# Patient Record
Sex: FEMALE | Race: WHITE | NOT HISPANIC OR LATINO | Employment: UNEMPLOYED | URBAN - METROPOLITAN AREA
[De-identification: names, ages, dates, MRNs, and addresses within clinical notes are randomized per-mention and may not be internally consistent; named-entity substitution may affect disease eponyms.]

---

## 2017-02-16 ENCOUNTER — CLINICAL SUPPORT (OUTPATIENT)
Dept: REHABILITATION | Facility: HOSPITAL | Age: 62
End: 2017-02-16
Attending: ORTHOPAEDIC SURGERY
Payer: MEDICAID

## 2017-02-16 DIAGNOSIS — M25.562 PAIN IN BOTH KNEES, UNSPECIFIED CHRONICITY: ICD-10-CM

## 2017-02-16 DIAGNOSIS — M17.0 PRIMARY OSTEOARTHRITIS OF BOTH KNEES: Primary | ICD-10-CM

## 2017-02-16 DIAGNOSIS — M25.561 PAIN IN BOTH KNEES, UNSPECIFIED CHRONICITY: ICD-10-CM

## 2017-02-16 PROCEDURE — 97110 THERAPEUTIC EXERCISES: CPT | Performed by: PHYSICAL MEDICINE & REHABILITATION

## 2017-02-16 PROCEDURE — 97162 PT EVAL MOD COMPLEX 30 MIN: CPT | Performed by: PHYSICAL MEDICINE & REHABILITATION
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
## 2017-03-29 ENCOUNTER — DOCUMENTATION ONLY (OUTPATIENT)
Dept: REHABILITATION | Facility: HOSPITAL | Age: 62
End: 2017-03-29

## 2017-03-29 DIAGNOSIS — G89.29 CHRONIC PAIN OF BOTH KNEES: ICD-10-CM

## 2017-03-29 DIAGNOSIS — M25.562 CHRONIC PAIN OF BOTH KNEES: ICD-10-CM

## 2017-03-29 DIAGNOSIS — M25.561 CHRONIC PAIN OF BOTH KNEES: ICD-10-CM

## 2017-03-29 DIAGNOSIS — M17.0 PRIMARY OSTEOARTHRITIS OF BOTH KNEES: Primary | ICD-10-CM

## 2017-03-29 NOTE — PROGRESS NOTES
Ms. Sevilla was seen in outpatient physical for an initial evaluation on 2/16/17 for osteoarthritis of both knees. Ms. Sevilla has self discharged from physical therapy as she has not returned for further physical therapy since the initial evaluation.  POC has ended  And patient is discharged from physical therapy.        Princess Paez, PT

## 2018-11-07 DIAGNOSIS — Z12.31 VISIT FOR SCREENING MAMMOGRAM: Primary | ICD-10-CM

## 2018-11-16 ENCOUNTER — TELEPHONE (OUTPATIENT)
Dept: ORTHOPEDICS | Facility: CLINIC | Age: 63
End: 2018-11-16

## 2018-11-16 DIAGNOSIS — M25.562 LEFT KNEE PAIN, UNSPECIFIED CHRONICITY: Primary | ICD-10-CM

## 2018-11-19 ENCOUNTER — OFFICE VISIT (OUTPATIENT)
Dept: ORTHOPEDICS | Facility: CLINIC | Age: 63
End: 2018-11-19
Payer: MEDICAID

## 2018-11-19 VITALS — HEIGHT: 66 IN | BODY MASS INDEX: 42.75 KG/M2 | WEIGHT: 266 LBS

## 2018-11-19 DIAGNOSIS — M17.12 ARTHRITIS OF KNEE, LEFT: Primary | ICD-10-CM

## 2018-11-19 PROCEDURE — 20610 DRAIN/INJ JOINT/BURSA W/O US: CPT | Mod: PBBFAC,PN | Performed by: ORTHOPAEDIC SURGERY

## 2018-11-19 PROCEDURE — 99212 OFFICE O/P EST SF 10 MIN: CPT | Mod: PBBFAC,PN,25 | Performed by: ORTHOPAEDIC SURGERY

## 2018-11-19 PROCEDURE — 99999 PR PBB SHADOW E&M-EST. PATIENT-LVL II: CPT | Mod: PBBFAC,,, | Performed by: ORTHOPAEDIC SURGERY

## 2018-11-19 PROCEDURE — 99204 OFFICE O/P NEW MOD 45 MIN: CPT | Mod: S$PBB,25,, | Performed by: ORTHOPAEDIC SURGERY

## 2018-11-19 PROCEDURE — 20610 DRAIN/INJ JOINT/BURSA W/O US: CPT | Mod: S$PBB,LT,, | Performed by: ORTHOPAEDIC SURGERY

## 2018-11-19 RX ORDER — CELECOXIB 50 MG/1
50 CAPSULE ORAL 2 TIMES DAILY
Qty: 60 CAPSULE | Refills: 1 | Status: SHIPPED | OUTPATIENT
Start: 2018-11-19 | End: 2019-03-12

## 2018-11-19 RX ORDER — TRIAMCINOLONE ACETONIDE 40 MG/ML
40 INJECTION, SUSPENSION INTRA-ARTICULAR; INTRAMUSCULAR
Status: COMPLETED | OUTPATIENT
Start: 2018-11-19 | End: 2018-11-19

## 2018-11-19 RX ORDER — MELOXICAM 15 MG/1
15 TABLET ORAL DAILY
COMMUNITY
End: 2018-11-19 | Stop reason: ALTCHOICE

## 2018-11-19 RX ADMIN — TRIAMCINOLONE ACETONIDE 40 MG: 40 INJECTION, SUSPENSION INTRA-ARTICULAR; INTRAMUSCULAR at 09:11

## 2018-11-19 NOTE — LETTER
November 19, 2018      Sofya Rangel, JOANNA  [address]  [address]  [facility]  [place] [id number]           [facility]  [address]  [place] [address]  Phone: [phone]  Fax: [phone]          Patient: Martha Sevilla   MR Number: [id number]   YOB: 1955   Date of Visit: 11/19/2018       Dear Sofya Rangel:    Thank you for referring Martha Sevilla to me for evaluation. Attached you will find relevant portions of my assessment and plan of care.    If you have questions, please do not hesitate to call me. I look forward to following Martha Sevilla along with you.    Sincerely,    Shaunna Alcantar PA-C    Enclosure  CC:  No Recipients    If you would like to receive this communication electronically, please contact [email or web address] or [phone] to request more information on [employer] access.    For providers and/or their staff who would like to refer a patient to [facility], please contact us through our one-stop-shop provider referral line, [facility], at [phone].    If you feel you have received this communication in error or would no longer like to receive these types of communications, please e-mail [email or web address]

## 2018-11-19 NOTE — PROGRESS NOTES
"Subjective:      Patient ID: Martha Sevilla is a 62 y.o. female.    Chief Complaint: Pain of the Left Knee      HPI: Martha Sevilla is a 62 y.o. female who denies any PHM or daily medication other than NSAIDs. She is here for an initial visit with complaints of left knee pain. She has experienced problems with their left knee for >1 year duration. The patient denies relevant history of injury/aggravation. Pain is located medially Associated symptoms include swelling and pseudolocking. They have been treated with NSAIDS (meloxicam).   Symptoms have recently stayed the same. Ambulation reportedly has been impaired; pt notices she has been walking with a limp recently. Self care ADLs are not painful.     History reviewed. No pertinent past medical history.    Current Outpatient Medications:     celecoxib (CELEBREX) 50 MG capsule, Take 1 capsule (50 mg total) by mouth 2 (two) times daily., Disp: 60 capsule, Rfl: 1  No current facility-administered medications for this visit.   Review of patient's allergies indicates:  No Known Allergies    Ht 5' 6" (1.676 m)   Wt 120.7 kg (266 lb)   BMI 42.93 kg/m²     ROS      Objective:     There were no vitals filed for this visit. Ortho Exam     left KNEE:  The patient is not in acute distress.   Body habitus is obese.   The patient walks with a limp.  Resisted SLR negative.   The skin over the knee is intact.  Knee effusion none   Tendernes is located medial  Range of motion- Flexion 120 deg, Extension 0 deg,   Ligament exam:   MCL 1+ laxity   Lachman intact              Post sag intact    LCL intact  Patellar apprehension negative.  Popliteal cyst positive  Patellar crepitation present.  Flexion/pinch negative.  Pulses DP present, PT present.  Motor no muscle wasting or atrophy.   Sensory normal.    GEN: Well developed, well nourished female. AAOX3. No acute distress.   Normocephalic, atraumatic.   STIVEN  Breathing unlabored.  Mood and affect appropriate.    "   Assessment:     Imaging:  I personally reviewed and interpreted the left knee radiographs from today which revealed moderate osteoarthritis with medial joint space narrowing and osteophyte formation.  There is a possible loose body versus osteophyte at the lateral femoral condyle.         1. Arthritis of knee, left          Plan:       I explained the nature of the problem to the patient in regards to knee osteoarthritis.  She is not tried much in the way of treatment except for meloxicam occasionally.  Patient reports meloxicam is not that helpful.  I have recommended switching from meloxicam to Celebrex b.i.d..  I also recommended performed corticosteroid injection of the left knee.  Fitted provide the patient with a hinged knee brace.    I explained the potential role of surgery in the treatment of this condition. They understand that if non surgical measures do not adequately control symptoms, surgery will be considered in the future.     Orders Placed This Encounter    celecoxib (CELEBREX) 50 MG capsule    triamcinolone acetonide injection 40 mg     Follow-up in about 2 months (around 1/19/2019).

## 2018-11-20 ENCOUNTER — HOSPITAL ENCOUNTER (OUTPATIENT)
Dept: RADIOLOGY | Facility: HOSPITAL | Age: 63
Discharge: HOME | End: 2018-11-20
Attending: NURSE PRACTITIONER
Payer: MEDICAID

## 2018-11-20 DIAGNOSIS — Z12.31 VISIT FOR SCREENING MAMMOGRAM: ICD-10-CM

## 2018-11-20 PROCEDURE — 77063 BREAST TOMOSYNTHESIS BI: CPT | Mod: TC,PO

## 2018-11-26 NOTE — PROCEDURES
"Martha Sevilla is a 62 y.o. female patient.    Weight: 120.7 kg (266 lb) (11/19/18 0843)  Height: 5' 6" (167.6 cm) (11/19/18 0843)       Procedures   PROCEDURE:  I have explained the risks, benefits, and alternatives of the procedure in detail.  The patient voices understanding and all questions have been answered.  The patient agrees to proceed as planned. So after I performed a sterile prep of the skin in the normal fashion the left knee is injected using a 21 gauge needle with a combination of 1cc 1% plain xylocaine and 40mg triamcinolone.  The patient is cautioned that immediate relief of pain is secondary to the local anesthetic and will be temporary. After the anesthetic wears off there may be a increase in pain that may last for a few hours or a few days and they should use ice to help alleviate this this pain. Patient tolerated the procedure well.             Shaunna Alcantar  11/26/2018    "

## 2018-12-06 ENCOUNTER — HOSPITAL ENCOUNTER (EMERGENCY)
Facility: HOSPITAL | Age: 63
Discharge: HOME | End: 2018-12-06
Attending: SURGERY
Payer: MEDICAID

## 2018-12-06 VITALS
TEMPERATURE: 98 F | OXYGEN SATURATION: 98 % | SYSTOLIC BLOOD PRESSURE: 144 MMHG | BODY MASS INDEX: 41.12 KG/M2 | WEIGHT: 262 LBS | HEART RATE: 70 BPM | RESPIRATION RATE: 20 BRPM | HEIGHT: 67 IN | DIASTOLIC BLOOD PRESSURE: 80 MMHG

## 2018-12-06 DIAGNOSIS — M25.569 KNEE PAIN: ICD-10-CM

## 2018-12-06 DIAGNOSIS — T14.8XXA ABRASION: Primary | ICD-10-CM

## 2018-12-06 PROCEDURE — 25000003 PHARM REV CODE 250: Performed by: PHYSICIAN ASSISTANT

## 2018-12-06 PROCEDURE — 99283 EMERGENCY DEPT VISIT LOW MDM: CPT

## 2018-12-06 RX ORDER — CLINDAMYCIN HYDROCHLORIDE 150 MG/1
300 CAPSULE ORAL 4 TIMES DAILY
Qty: 56 CAPSULE | Refills: 0 | Status: SHIPPED | OUTPATIENT
Start: 2018-12-06 | End: 2018-12-13

## 2018-12-06 RX ADMIN — BACITRACIN, NEOMYCIN, POLYMYXIN B 1 EACH: 400; 3.5; 5 OINTMENT TOPICAL at 03:12

## 2018-12-06 NOTE — ED PROVIDER NOTES
Encounter Date: 12/6/2018       History     Chief Complaint   Patient presents with    Right knee pain     patient states that she fell on Monday      63-year-old female presents to the emergency department for evaluation of 4 day history of right knee pain status post slip and fall.  She reports that 4 days ago she was walking on cement accidentally slipped and fell onto her right knee.  She reports that she did not hit her head nor lose consciousness.  She reports that she skinned the front of her knee and now is concerned that it may be moving to recent infection.  She reports mild redness surrounding the scab.  She denies any numbness, tingling, weakness or swelling to the lower extremities.  She denies any fever, headache, dizziness, neck pain, chest pain, abdominal pain, nausea, vomiting or diarrhea.  No treatment was attempted prior to arrival.          Review of patient's allergies indicates:  No Known Allergies  No past medical history on file.  Past Surgical History:   Procedure Laterality Date    TUBAL LIGATION       No family history on file.  Social History     Tobacco Use    Smoking status: Never Smoker   Substance Use Topics    Alcohol use: No    Drug use: No     Review of Systems   Constitutional: Negative for activity change, appetite change and fever.   HENT: Negative for congestion, ear discharge, mouth sores, rhinorrhea, sinus pressure, trouble swallowing and voice change.    Eyes: Negative for photophobia and visual disturbance.   Respiratory: Negative for cough, chest tightness, shortness of breath and wheezing.    Cardiovascular: Negative for chest pain.   Gastrointestinal: Negative for abdominal pain, constipation, diarrhea, nausea and vomiting.   Genitourinary: Negative for decreased urine volume, dysuria, hematuria and vaginal pain.   Musculoskeletal: Positive for arthralgias. Negative for back pain, joint swelling, neck pain and neck stiffness.   Skin: Positive for wound.    Neurological: Negative for dizziness, syncope, weakness, light-headedness, numbness and headaches.       Physical Exam     Initial Vitals [12/06/18 1410]   BP Pulse Resp Temp SpO2   (!) 178/86 76 18 98.2 °F (36.8 °C) 96 %      MAP       --         Physical Exam    Nursing note and vitals reviewed.  Constitutional: She appears well-developed and well-nourished. She is not diaphoretic. No distress.   HENT:   Head: Normocephalic and atraumatic.   Right Ear: External ear normal.   Left Ear: External ear normal.   Nose: Nose normal.   Mouth/Throat: Oropharynx is clear and moist.   Eyes: Conjunctivae and EOM are normal. Pupils are equal, round, and reactive to light.   Neck: Normal range of motion. Neck supple.   Cardiovascular: Normal rate, regular rhythm and normal heart sounds.   Pulmonary/Chest: Breath sounds normal. No respiratory distress. She has no wheezes. She has no rhonchi. She has no rales. She exhibits no tenderness.   Abdominal: Soft. Bowel sounds are normal. She exhibits no distension. There is no tenderness. There is no rebound.   Musculoskeletal: Normal range of motion.        Right hip: She exhibits normal range of motion, normal strength and no tenderness.        Right knee: She exhibits bony tenderness. She exhibits normal range of motion and no swelling. Tenderness found.        Right ankle: She exhibits normal range of motion, no swelling and no ecchymosis. No tenderness.        Left upper leg: She exhibits no tenderness, no bony tenderness and no swelling.        Left lower leg: She exhibits no tenderness, no bony tenderness and no swelling.        Legs:  Lymphadenopathy:     She has no cervical adenopathy.   Neurological: She is alert and oriented to person, place, and time.   Skin: Skin is warm and dry.   Psychiatric: She has a normal mood and affect.         ED Course   Procedures  Labs Reviewed - No data to display       Imaging Results          X-Ray Knee 3 View Right (Final result)  Result  time 12/06/18 16:14:54    Final result by LAM Swan Sr., MD (12/06/18 16:14:54)                 Impression:      Normal study.      Electronically signed by: Petr wSan MD  Date:    12/06/2018  Time:    16:14             Narrative:    EXAMINATION:  XR KNEE 3 VIEW RIGHT    CLINICAL HISTORY:  Pain in unspecified knee    COMPARISON:  None    FINDINGS:  There is no fracture. There is no dislocation.                                 Medical Decision Making:   Initial Assessment:   63-year-old female presents for evaluation of knee pain status post trip and fall.  Physical exam reveals a nontoxic-appearing female in no acute distress. Patient is afebrile vital signs within normal limits. No evidence of head injury noted.  No tenderness to palpation noted of the paraspinal musculature or the spinous processes of the cervical, thoracic or lumbar spine.  Lungs clear to auscultation bilaterally. No respiratory distress or accessory muscle use noted. Abdominal exam reveals soft abdomen, nontender palpation. No CVA tenderness noted. Examination of the right lower extremity reveals Small well-healing abrasion noted to the anterior aspect of the right knee.  Mild surrounding erythema noted. No induration, warmth or fluctuance noted. No discharge or ulcerations noted. Full range of motion, sensation or peripheral pulses intact in lower extremities bilaterally. Patient ambulates well without hesitation or gait abnormality.  Differential Diagnosis:   X-ray ordered to assess possible osseous injury including fracture or dislocation.  Abrasion  Early cellulitis.  I carefully considered but doubt serious etiology including DVT , abscess and septic joint.  ED Management:  X-ray reveals no acute findings.  The patient placed in Ace wrap for comfort.  Instructed the patient to follow up with her primary care provider for re-evaluation and to return to the emergency department immediately for any new or worsening symptoms.                       Clinical Impression:   The primary encounter diagnosis was Abrasion. A diagnosis of Knee pain was also pertinent to this visit.                             Shira Waters PA-C  12/07/18 0015

## 2018-12-06 NOTE — ED NOTES
Per pt fell at work works place on Monday, her right knee has been hurting there is a round abrasion a little bigger than a quarter that is red and yellow and painful. Pain is 8/10. Most of the pain is at night when knee is thribbing.

## 2018-12-06 NOTE — DISCHARGE INSTRUCTIONS
You are instructed to follow up with her primary care provider for re-evaluation.  You are instructed to return to the emergency department immediately for any new or worsening symptoms.

## 2019-01-22 ENCOUNTER — OFFICE VISIT (OUTPATIENT)
Dept: ORTHOPEDICS | Facility: CLINIC | Age: 64
End: 2019-01-22
Payer: MEDICAID

## 2019-01-22 ENCOUNTER — TELEPHONE (OUTPATIENT)
Dept: ORTHOPEDICS | Facility: CLINIC | Age: 64
End: 2019-01-22

## 2019-01-22 VITALS — BODY MASS INDEX: 41.12 KG/M2 | WEIGHT: 262 LBS | HEIGHT: 67 IN

## 2019-01-22 DIAGNOSIS — M17.12 ARTHRITIS OF KNEE, LEFT: Primary | ICD-10-CM

## 2019-01-22 PROCEDURE — 99999 PR PBB SHADOW E&M-EST. PATIENT-LVL II: ICD-10-PCS | Mod: PBBFAC,,, | Performed by: ORTHOPAEDIC SURGERY

## 2019-01-22 PROCEDURE — 99999 PR PBB SHADOW E&M-EST. PATIENT-LVL II: CPT | Mod: PBBFAC,,, | Performed by: ORTHOPAEDIC SURGERY

## 2019-01-22 PROCEDURE — 99214 PR OFFICE/OUTPT VISIT, EST, LEVL IV, 30-39 MIN: ICD-10-PCS | Mod: S$PBB,,, | Performed by: ORTHOPAEDIC SURGERY

## 2019-01-22 PROCEDURE — 99214 OFFICE O/P EST MOD 30 MIN: CPT | Mod: S$PBB,,, | Performed by: ORTHOPAEDIC SURGERY

## 2019-01-22 PROCEDURE — 99212 OFFICE O/P EST SF 10 MIN: CPT | Mod: PBBFAC,PN | Performed by: ORTHOPAEDIC SURGERY

## 2019-01-22 NOTE — TELEPHONE ENCOUNTER
----- Message from Lisandra Greenberg sent at 1/22/2019  8:16 AM CST -----  Contact: [phone]/self  Patient is running late for their appointment and would like to know if they can still be seen. Please advise.

## 2019-01-22 NOTE — PROGRESS NOTES
Subjective:      Patient ID: Martha Sevilla is a 63 y.o. female.    Chief Complaint: Pain and Follow-up of the Left Knee    HPI  Follow-up for osteoarthritis.  Patient has been treated with injection and oral medication.  She complains of persistent medial pain, limiting her ability to walk and perform her ADLs.  Review of Systems   Constitution: Negative for fever and weight loss.   HENT: Negative for congestion.    Eyes: Negative for visual disturbance.   Cardiovascular: Negative for chest pain.   Respiratory: Negative for shortness of breath.    Hematologic/Lymphatic: Negative for bleeding problem. Does not bruise/bleed easily.   Skin: Negative for poor wound healing.   Musculoskeletal: Positive for joint pain.   Gastrointestinal: Negative for abdominal pain.   Genitourinary: Negative for dysuria.   Neurological: Negative for seizures.   Psychiatric/Behavioral: Negative for altered mental status.   Allergic/Immunologic: Negative for persistent infections.         Objective:            Ortho/SPM Exam    Left knee    The patient is not in acute distress.   Body habitus is obese  Sclera appear normal  No respiratory distress  The patient walks with a limp.  Resisted SLR negative.   The skin over the knee is intact.  Knee effusion 0  Tendernes is located medially.  Range of motion- Flexion 120, Extension full.   Ligament exam:   MCL 1+   Lachman intact              Post sag intact    LCL intact  Patellar apprehension negative.  Popliteal cyst negative  Patellar crepitation present  Pulses DP present, PT present.  Motor normal 5/5 strength in all tested muscle groups.   Sensory normal.    I reviewed the relevant radiographic images for the patient's condition:  Recent left knee films show complete loss of medial joint space with lateral tilt of the patella      Assessment:       Encounter Diagnosis   Name Primary?    Arthritis of knee, left Yes          The condition is objectively advanced, with painful  limitation of normal activity and poor response to nonsurgical measures  Plan:       Martha was seen today for pain and follow-up.    Diagnoses and all orders for this visit:    Arthritis of knee, left        I explained my diagnostic impression and the reasoning behind it in detail, using layman's terms.  Models and/or pictures were used to help in the explanation.    Treatment options were discussed. The surgical process of left knee replacement was discussed in detail with the patient including a detailed discussion of the procedure itself (including visual model, x-ray review, and literature review). The typical perioperative and post-operative course was discussed and perioperative risks were discussed to the patient's satisfaction.  Risks and complications discussed included but were not limited to the risks of anesthetic complications, infection, bleeding, wound healing complications, stiffness, aseptic loosening, instability, limb length inequality, neurologic dysfunction including numbness and weakness, additional surgery,  DVT, pulmonary embolism, perioperative medical risks (cardiac, pulmonary, renal, neurologic), and death and the patient elects to proceed.

## 2019-01-24 DIAGNOSIS — M17.12 ARTHRITIS OF KNEE, LEFT: Primary | ICD-10-CM

## 2019-01-24 RX ORDER — PREGABALIN 50 MG/1
150 CAPSULE ORAL
Status: CANCELLED | OUTPATIENT
Start: 2019-01-24 | End: 2019-01-24

## 2019-01-24 RX ORDER — NAPROXEN 250 MG/1
500 TABLET ORAL
Status: CANCELLED | OUTPATIENT
Start: 2019-01-24 | End: 2019-01-24

## 2019-01-24 RX ORDER — SODIUM CHLORIDE 0.9 % (FLUSH) 0.9 %
3 SYRINGE (ML) INJECTION
Status: CANCELLED | OUTPATIENT
Start: 2019-01-24

## 2019-01-24 RX ORDER — ACETAMINOPHEN 325 MG/1
1000 TABLET ORAL
Status: CANCELLED | OUTPATIENT
Start: 2019-01-24 | End: 2019-01-24

## 2019-01-24 RX ORDER — MUPIROCIN 20 MG/G
OINTMENT TOPICAL
Status: CANCELLED | OUTPATIENT
Start: 2019-01-24

## 2019-01-24 RX ORDER — OXYCODONE HCL 10 MG/1
10 TABLET, FILM COATED, EXTENDED RELEASE ORAL
Status: CANCELLED | OUTPATIENT
Start: 2019-01-24 | End: 2019-01-24

## 2019-01-28 ENCOUNTER — TELEPHONE (OUTPATIENT)
Dept: ORTHOPEDICS | Facility: CLINIC | Age: 64
End: 2019-01-28

## 2019-01-28 NOTE — TELEPHONE ENCOUNTER
Spoke with patient to cancel surgery. She states that she will like to call back to reschedule at a better time. Verbalized understanding. Sushma was emailed to cancel surgery.  ----- Message from Felipa Whipple sent at 1/28/2019  2:12 PM CST -----  Contact: [phone]/self  Patient requesting to speak with you regarding rescheduling her surgery. Please advise.

## 2019-02-04 ENCOUNTER — TELEPHONE (OUTPATIENT)
Dept: ORTHOPEDICS | Facility: CLINIC | Age: 64
End: 2019-02-04

## 2019-02-20 ENCOUNTER — TELEPHONE (OUTPATIENT)
Dept: ORTHOPEDICS | Facility: CLINIC | Age: 64
End: 2019-02-20

## 2019-02-20 NOTE — TELEPHONE ENCOUNTER
Spoke with patient to reschedule surgery date to March 18th. Patient was also scheduled for pre op and post op appointments. She was made aware of date, time, and location. Verbalized understanding.    ----- Message from Montse Peacock MA sent at 2/18/2019  3:37 PM CST -----  Contact: Self [phone]  Please see message below .  ----- Message -----  From: Gina Serna  Sent: 2/18/2019   3:33 PM  To: [facility] Staff    Patient would like to speak with you about rescheduling her surgery to March. Please advise

## 2019-03-12 ENCOUNTER — OFFICE VISIT (OUTPATIENT)
Dept: ORTHOPEDICS | Facility: CLINIC | Age: 64
End: 2019-03-12
Payer: MEDICAID

## 2019-03-12 VITALS — WEIGHT: 262 LBS | HEIGHT: 67 IN | BODY MASS INDEX: 41.12 KG/M2

## 2019-03-12 DIAGNOSIS — Z01.818 PRE-OP TESTING: Primary | ICD-10-CM

## 2019-03-12 PROCEDURE — 99499 NO LOS: ICD-10-PCS | Mod: S$PBB,,, | Performed by: ORTHOPAEDIC SURGERY

## 2019-03-12 PROCEDURE — 99999 PR PBB SHADOW E&M-EST. PATIENT-LVL II: CPT | Mod: PBBFAC,,, | Performed by: ORTHOPAEDIC SURGERY

## 2019-03-12 PROCEDURE — 99212 OFFICE O/P EST SF 10 MIN: CPT | Mod: PBBFAC,PN | Performed by: ORTHOPAEDIC SURGERY

## 2019-03-12 PROCEDURE — 99499 UNLISTED E&M SERVICE: CPT | Mod: S$PBB,,, | Performed by: ORTHOPAEDIC SURGERY

## 2019-03-12 PROCEDURE — 99999 PR PBB SHADOW E&M-EST. PATIENT-LVL II: ICD-10-PCS | Mod: PBBFAC,,, | Performed by: ORTHOPAEDIC SURGERY

## 2019-03-12 RX ORDER — IBUPROFEN 600 MG/1
TABLET ORAL
Refills: 0 | Status: ON HOLD | COMMUNITY
Start: 2019-03-04 | End: 2021-05-20

## 2019-03-12 NOTE — H&P
Subjective:       Patient ID: Martha Sevilla is a 63 y.o. female.    Chief Complaint: Pre-op Exam of the Left Knee      Martha Sevilla is a 63 y.o. female without any significant PMH. She is here today for a pre-operative visit in preparation for a Left total knee arthroplasty to be performed by  Dr. Pérez on 3/18/19.  Martha Sevilla has a >2 years history of Left knee pain.  Pain is worse with activity and weight bearing. Patient has experienced interference of ADLs due to increased pain and decreased range of motion. Patient has failed non-operative treatment including NSAIDs, activity modification, and corticosteroid injections. Martha Sevilla ambulates independently.  There has been no significant change in medical status since last visit.  She was seen by Primary provider in November, Sofya Rangel NP,  and was found to be in good health and is not on any long term medications.         History reviewed. No pertinent past medical history.  Past Surgical History:   Procedure Laterality Date    TUBAL LIGATION       History reviewed. No pertinent family history.  Social History     Socioeconomic History    Marital status:      Spouse name: None    Number of children: None    Years of education: None    Highest education level: None   Social Needs    Financial resource strain: None    Food insecurity - worry: None    Food insecurity - inability: None    Transportation needs - medical: None    Transportation needs - non-medical: None   Occupational History    None   Tobacco Use    Smoking status: Never Smoker   Substance and Sexual Activity    Alcohol use: No    Drug use: No    Sexual activity: None   Other Topics Concern    None   Social History Narrative    None       Current Outpatient Medications   Medication Sig Dispense Refill    ibuprofen (ADVIL,MOTRIN) 600 MG tablet TK 1 T PO Q 6 H PRN. TK WF  0     No current facility-administered medications for this visit.   "    Review of patient's allergies indicates:  No Known Allergies    Review of Systems   Constitutional: Negative for chills and fever.   HENT: Negative for trouble swallowing.    Respiratory: Negative for chest tightness and shortness of breath.    Cardiovascular: Negative for chest pain and palpitations.   Gastrointestinal: Negative for abdominal pain and blood in stool.   Genitourinary: Negative for dysuria and hematuria.   Musculoskeletal: Positive for arthralgias and gait problem.   Skin: Negative for rash and wound.   Neurological: Negative for dizziness, syncope and light-headedness.   Psychiatric/Behavioral: Negative for agitation, behavioral problems and confusion.       Objective:      Vitals:    03/12/19 1402   Weight: 118.8 kg (262 lb)   Height: 5' 7" (1.702 m)     Physical Exam   Constitutional: She is oriented to person, place, and time. She appears well-developed and well-nourished.   HENT:   Head: Normocephalic and atraumatic.   Eyes: Pupils are equal, round, and reactive to light.   Cardiovascular: Normal rate and regular rhythm. Exam reveals no gallop and no friction rub.   No murmur heard.  Pulmonary/Chest: Effort normal and breath sounds normal. No stridor. No respiratory distress. She has no wheezes.   Abdominal: Soft. She exhibits no distension. There is no tenderness.   Neurological: She is alert and oriented to person, place, and time.   Skin: Skin is warm. Capillary refill takes less than 2 seconds. No erythema.   Psychiatric: She has a normal mood and affect. Her behavior is normal.     LEFT KNEE:  The patient walks with a limp.  Resisted SLR negative.   The skin over the knee is intact.  Knee effusion 0  Tendernes is located medially.  Range of motion- Flexion 120, Extension full.   Ligament exam:              MCL 1+              Lachman intact              Post sag intact              LCL intact  Patellar apprehension negative.  Popliteal cyst negative  Patellar crepitation present  Pulses " DP present, PT present.  Motor normal 5/5 strength in all tested muscle groups.   Sensory normal.     Lab Review: Labs ordered today  Diagnostics Review: X-Ray: Reviewed     Assessment:       1. Pre-op testing        Plan:       Healthy female.  No labs or EKG on file. I have ordered these today.  Pt recently saw PCP but I do not see a note from this. I have asked the pt to have this faxed if possible.      Pre, rashaad, and post operative procedures and expectations discussed. All questions were answered.   Martha Sevilla will contact us if there are any questions, concerns, or changes in medical status prior to surgery.  I contacted Apoorva Dobson to get Martha Sevilla a [facility] appointment.

## 2019-03-13 ENCOUNTER — HOSPITAL ENCOUNTER (OUTPATIENT)
Dept: CARDIOLOGY | Facility: HOSPITAL | Age: 64
Discharge: HOME | End: 2019-03-13
Attending: ORTHOPAEDIC SURGERY
Payer: MEDICAID

## 2019-03-13 DIAGNOSIS — Z01.818 PRE-OP TESTING: ICD-10-CM

## 2019-03-13 PROCEDURE — 93010 EKG 12-LEAD: ICD-10-PCS | Mod: ,,, | Performed by: STUDENT IN AN ORGANIZED HEALTH CARE EDUCATION/TRAINING PROGRAM

## 2019-03-13 PROCEDURE — 93005 ELECTROCARDIOGRAM TRACING: CPT | Mod: PO

## 2019-03-13 PROCEDURE — 93010 ELECTROCARDIOGRAM REPORT: CPT | Mod: ,,, | Performed by: STUDENT IN AN ORGANIZED HEALTH CARE EDUCATION/TRAINING PROGRAM

## 2019-03-18 PROBLEM — Z96.652 HISTORY OF LEFT KNEE REPLACEMENT: Status: ACTIVE | Noted: 2019-03-18

## 2019-03-18 PROBLEM — M17.12 ARTHRITIS OF KNEE, LEFT: Status: ACTIVE | Noted: 2019-03-18

## 2019-03-22 ENCOUNTER — TELEPHONE (OUTPATIENT)
Dept: ORTHOPEDICS | Facility: CLINIC | Age: 64
End: 2019-03-22

## 2019-03-22 NOTE — TELEPHONE ENCOUNTER
Spoke with patient in regards to PT has not been approved yet . Verbalized understanding   ----- Message from Pascale Ceballos sent at 3/22/2019  9:34 AM CDT -----  Contact: self / [phone]  Patient is requesting a call back regarding, she has not had a call for her physical therapy. Please advise

## 2019-03-22 NOTE — TELEPHONE ENCOUNTER
Duplicate   ----- Message from aPscale Ceballos sent at 3/22/2019  1:47 PM CDT -----  Contact: Sister Claribel Judge - [phone]  Needs a call back about her physical therapy @ home. Please advise

## 2019-03-22 NOTE — TELEPHONE ENCOUNTER
DUPLICATE .  ----- Message from Atiya Kaur sent at 3/22/2019 12:54 PM CDT -----  Hello pt states she needs home health stated she has been trying to call to speak w/Dr Duff

## 2019-03-27 ENCOUNTER — HOSPITAL ENCOUNTER (EMERGENCY)
Facility: HOSPITAL | Age: 64
Discharge: HOME | End: 2019-03-27
Attending: FAMILY MEDICINE
Payer: MEDICAID

## 2019-03-27 ENCOUNTER — TELEPHONE (OUTPATIENT)
Dept: ORTHOPEDICS | Facility: CLINIC | Age: 64
End: 2019-03-27

## 2019-03-27 VITALS
OXYGEN SATURATION: 96 % | TEMPERATURE: 98 F | HEART RATE: 92 BPM | BODY MASS INDEX: 43.39 KG/M2 | WEIGHT: 270 LBS | SYSTOLIC BLOOD PRESSURE: 145 MMHG | RESPIRATION RATE: 20 BRPM | HEIGHT: 66 IN | DIASTOLIC BLOOD PRESSURE: 69 MMHG

## 2019-03-27 DIAGNOSIS — M79.662 PAIN OF LEFT CALF: ICD-10-CM

## 2019-03-27 DIAGNOSIS — M79.661 RIGHT CALF PAIN: ICD-10-CM

## 2019-03-27 DIAGNOSIS — Z96.652 HISTORY OF TOTAL KNEE ARTHROPLASTY, LEFT: ICD-10-CM

## 2019-03-27 DIAGNOSIS — L25.9 CONTACT DERMATITIS, UNSPECIFIED CONTACT DERMATITIS TYPE, UNSPECIFIED TRIGGER: Primary | ICD-10-CM

## 2019-03-27 DIAGNOSIS — Z96.651 HISTORY OF TOTAL KNEE ARTHROPLASTY, RIGHT: ICD-10-CM

## 2019-03-27 PROCEDURE — 99284 EMERGENCY DEPT VISIT MOD MDM: CPT | Mod: ER

## 2019-03-27 PROCEDURE — 25000003 PHARM REV CODE 250: Mod: ER | Performed by: PHYSICIAN ASSISTANT

## 2019-03-27 RX ORDER — PROMETHAZINE HYDROCHLORIDE 25 MG/1
25 TABLET ORAL NIGHTLY PRN
Qty: 30 TABLET | Refills: 0 | Status: SHIPPED | OUTPATIENT
Start: 2019-03-27 | End: 2019-11-19

## 2019-03-27 RX ORDER — DIPHENHYDRAMINE HCL 25 MG
25 CAPSULE ORAL
Status: COMPLETED | OUTPATIENT
Start: 2019-03-27 | End: 2019-03-27

## 2019-03-27 RX ORDER — TRIAMCINOLONE ACETONIDE 0.25 MG/G
CREAM TOPICAL 2 TIMES DAILY
Qty: 30 G | Refills: 0 | Status: SHIPPED | OUTPATIENT
Start: 2019-03-27 | End: 2019-11-19

## 2019-03-27 RX ADMIN — DIPHENHYDRAMINE HYDROCHLORIDE 25 MG: 25 CAPSULE ORAL at 03:03

## 2019-03-27 NOTE — TELEPHONE ENCOUNTER
"Spoke with patient in regards to patient feeling like her surgical  site is infected and wants to see the Doctor " ASAP " . I was able to make the pt a appt on 3/28/19 . I explained time , date , and location . Verbalized understanding .  ----- Message from Jeanette Schmitt sent at 3/27/2019 12:29 PM CDT -----  Patient's sister, Nu, called.  No. [phone]    Patient had knee surgery on 3/18/19.   The incision is infected.  Her leg is red and swollen.   She needs an appointment today.    Home health has not come to see her yet.      "

## 2019-03-28 ENCOUNTER — OFFICE VISIT (OUTPATIENT)
Dept: ORTHOPEDICS | Facility: CLINIC | Age: 64
End: 2019-03-28
Payer: MEDICAID

## 2019-03-28 VITALS — WEIGHT: 270 LBS | HEIGHT: 66 IN | BODY MASS INDEX: 43.39 KG/M2

## 2019-03-28 DIAGNOSIS — M25.562 LEFT KNEE PAIN, UNSPECIFIED CHRONICITY: Primary | ICD-10-CM

## 2019-03-28 PROCEDURE — 99999 PR PBB SHADOW E&M-EST. PATIENT-LVL II: ICD-10-PCS | Mod: PBBFAC,,, | Performed by: ORTHOPAEDIC SURGERY

## 2019-03-28 PROCEDURE — 99024 POSTOP FOLLOW-UP VISIT: CPT | Mod: ,,, | Performed by: ORTHOPAEDIC SURGERY

## 2019-03-28 PROCEDURE — 99999 PR PBB SHADOW E&M-EST. PATIENT-LVL II: CPT | Mod: PBBFAC,,, | Performed by: ORTHOPAEDIC SURGERY

## 2019-03-28 PROCEDURE — 99212 OFFICE O/P EST SF 10 MIN: CPT | Mod: PBBFAC,PN | Performed by: ORTHOPAEDIC SURGERY

## 2019-03-28 PROCEDURE — 99024 PR POST-OP FOLLOW-UP VISIT: ICD-10-PCS | Mod: ,,, | Performed by: ORTHOPAEDIC SURGERY

## 2019-03-28 RX ORDER — AMITRIPTYLINE HYDROCHLORIDE 50 MG/1
50 TABLET, FILM COATED ORAL NIGHTLY PRN
Qty: 30 TABLET | Refills: 2 | Status: SHIPPED | OUTPATIENT
Start: 2019-03-28 | End: 2019-04-23 | Stop reason: SDUPTHER

## 2019-03-28 NOTE — PROGRESS NOTES
"Subjective:      Patient ID: Martha Sevilla is a 63 y.o. female.    Chief Complaint: Pain of the Left Knee      HPI:  Ten days postop  The patient is seen for postop follow-up of left  TKA.  Pain control has been Problematic at night  They feel that they are ambulating easily  Preoperative complaints include:  Concerns about incision, pain at night    Current Outpatient Medications:     aspirin (ECOTRIN) 81 MG EC tablet, Take 1 tablet (81 mg total) by mouth once daily., Disp: 100 tablet, Rfl: 0    oxyCODONE-acetaminophen (PERCOCET) 5-325 mg per tablet, Fill this prescription 1st. Take 1 tablet by mouth every 4 hr as needed for pain, Disp: 28 tablet, Rfl: 0    oxyCODONE-acetaminophen (PERCOCET) 5-325 mg per tablet, Take 1 tablet by mouth every 6 (six) hours as needed for Pain. Fill this prescription if needed after your 1st 1 is done  Note pharmacy:  Recent major surgery-please allow early refill if patient requests, Disp: 40 tablet, Rfl: 0    promethazine (PHENERGAN) 25 MG tablet, Take 1 tablet (25 mg total) by mouth nightly as needed for Nausea., Disp: 30 tablet, Rfl: 0    triamcinolone acetonide 0.025% (KENALOG) 0.025 % cream, Apply topically 2 (two) times daily. Do not apply near incision, Disp: 30 g, Rfl: 0    ibuprofen (ADVIL,MOTRIN) 600 MG tablet, TK 1 T PO Q 6 H PRN. TK WF, Disp: , Rfl: 0  No current facility-administered medications for this visit.   Review of patient's allergies indicates:  No Known Allergies    Ht 5' 6" (1.676 m)   Wt 122.5 kg (270 lb)   LMP  (LMP Unknown)   BMI 43.58 kg/m²     ROS        Objective:    Ortho Exam          Alert, oriented, no acute distress  Sclera-Normal  Respiratory distress-none  Gait mild  Incision:  The wound is clean and has no drainage. There is a little erythema around the staples  Range of motion: extension- 5 degrees; flexion- a degrees  Valgus/varus stability- stable  Swelling-mild    Assessment:     Imaging:  None today        1. Left knee pain, " unspecified chronicity        The patient's wound appears normal for the time last.  There is no objective evidence of any acute postoperative complication        Plan:          No follow-ups on file.    I explained my impression the natural history of recovery from the procedure    New impervious dressing applied    Amitriptyline for night discomfort    Physical therapy scheduled    Follow up with him next week for wound clips removal and x-rays

## 2019-03-28 NOTE — ED PROVIDER NOTES
Encounter Date: 3/27/2019       History     Chief Complaint   Patient presents with    Rash    Pain in left calf     Patient is a 63-year-old female with history of left TKA 9 days ago with Dr. Pérez.  She is presenting with complaint of pruritic rash on the thigh for several days.  No exacerbating or relieving factors.  She also complains of constant moderate aching pain in the left posterior calf and slightly increased swelling. No chest pain or shortness of breath.  She has stop taking her pain medication during the day and is only taking Tylenol.  She still needs the pain medication at night but it is causing significant nausea.  No numbness or focal weakness.  She is still waiting for home health and PT to be established.        Review of patient's allergies indicates:  No Known Allergies  No past medical history on file.  Past Surgical History:   Procedure Laterality Date    ARTHROPLASTY, KNEE left Left 3/18/2019    Performed by Niraj Pérez MD at [facility] OR    Evacuation of hematoma of leg Right     TUBAL LIGATION       No family history on file.  Social History     Tobacco Use    Smoking status: Never Smoker    Smokeless tobacco: Never Used   Substance Use Topics    Alcohol use: No    Drug use: No     Review of Systems   Constitutional: Negative for activity change, appetite change, chills, fatigue and fever.   Respiratory: Negative for cough, shortness of breath and wheezing.    Cardiovascular: Negative for chest pain and leg swelling.   Musculoskeletal: Negative for neck pain and neck stiffness.        +left calf pain +swelling   Skin: Positive for rash.        + pruritis   Neurological: Negative for dizziness, weakness, numbness and headaches.   All other systems reviewed and are negative.      Physical Exam     Initial Vitals [03/27/19 1512]   BP Pulse Resp Temp SpO2   (!) 176/84 97 18 97.9 °F (36.6 °C) 97 %      MAP       --         Physical Exam    Nursing note and vitals  reviewed.  Constitutional: She appears well-developed and well-nourished. She appears distressed (Pain, anxious).   HENT:   Head: Normocephalic and atraumatic.   Nose: Nose normal.   Mouth/Throat: Oropharynx is clear and moist.   Eyes: Conjunctivae and EOM are normal. Pupils are equal, round, and reactive to light.   Neck: Normal range of motion. Neck supple.   Cardiovascular: Normal rate, regular rhythm, normal heart sounds and intact distal pulses.   Pulmonary/Chest: Breath sounds normal. No respiratory distress.   Musculoskeletal:   Incision on the left knee is healing well.  No purulent drainage. Edges are well approximated.  Appropriate swelling and tenderness.  There is mild swelling to the left posterior calf and tenderness to palpation.  No palpable cord.  Good distal pulses.   Lymphadenopathy:     She has no cervical adenopathy.   Neurological: She is alert and oriented to person, place, and time. She has normal strength. No sensory deficit.   Skin: Skin is warm and dry.   Erythematous papular rash on the anterior medial and lateral left thigh.  Appears to be a contact dermatitis.  No cellulitis.  No pustules or vesicles.  Excoriations present.   Psychiatric: She has a normal mood and affect. Her behavior is normal. Judgment and thought content normal.         ED Course   Procedures  Labs Reviewed - No data to display       Imaging Results          US Lower Extremity Veins Left (Final result)  Result time 03/27/19 16:19:42   Procedure changed from US Lower Extremity Veins Right     Final result by Emil Gallardo MD (03/27/19 16:19:42)                 Impression:      No evidence of deep venous thrombosis in the left lower extremity.      Electronically signed by: Emil Gallardo MD  Date:    03/27/2019  Time:    16:19             Narrative:    EXAMINATION:  US LOWER EXTREMITY VEINS LEFT    CLINICAL HISTORY:  leg pain; Pain in left lower leg    TECHNIQUE:  Duplex and color flow Doppler evaluation and graded  compression of the left lower extremity veins was performed.    COMPARISON:  None    FINDINGS:  Left thigh veins: The common femoral, femoral, popliteal, upper greater saphenous, and deep femoral veins are patent and free of thrombus. The veins are normally compressible and have normal phasic flow and augmentation response.    Left calf veins: The visualized calf veins are patent.    Miscellaneous: None                                 Medical Decision Making:   Clinical Tests:   Radiological Study: Ordered and Reviewed  No evidence of DVT on left lower extremity ultrasound.  Rash appears to be a contact dermatitis possibly from surgery but unable to determine.  She was given a prescription for triamcinolone cream but advised not to get it within an inch of the incision.  She can take Benadryl for itching.  She has an appointment tomorrow with her orthopedic.  Return to the ED for fever, severe pain or worse in any way.                      Clinical Impression:       ICD-10-CM ICD-9-CM   1. Contact dermatitis, unspecified contact dermatitis type, unspecified trigger L25.9 692.9   2. History of total knee arthroplasty, right Z96.651 V43.65   3. Right calf pain M79.661 729.5   4. Pain of left calf M79.662 729.5   5. History of total knee arthroplasty, left Z96.652 V43.65         Disposition:   Disposition: Discharged                        CURT Cartwright  03/27/19 9170

## 2019-04-01 ENCOUNTER — TELEPHONE (OUTPATIENT)
Dept: ORTHOPEDICS | Facility: CLINIC | Age: 64
End: 2019-04-01

## 2019-04-01 DIAGNOSIS — Z98.890 S/P LEFT KNEE SURGERY: Primary | ICD-10-CM

## 2019-04-02 ENCOUNTER — OFFICE VISIT (OUTPATIENT)
Dept: ORTHOPEDICS | Facility: CLINIC | Age: 64
End: 2019-04-02
Payer: MEDICAID

## 2019-04-02 VITALS — WEIGHT: 270 LBS | BODY MASS INDEX: 43.39 KG/M2 | HEIGHT: 66 IN

## 2019-04-02 DIAGNOSIS — Z98.890 S/P LEFT KNEE SURGERY: Primary | ICD-10-CM

## 2019-04-02 PROCEDURE — 99024 PR POST-OP FOLLOW-UP VISIT: ICD-10-PCS | Mod: ,,, | Performed by: ORTHOPAEDIC SURGERY

## 2019-04-02 PROCEDURE — 99999 PR PBB SHADOW E&M-EST. PATIENT-LVL II: CPT | Mod: PBBFAC,,, | Performed by: ORTHOPAEDIC SURGERY

## 2019-04-02 PROCEDURE — 99999 PR PBB SHADOW E&M-EST. PATIENT-LVL II: ICD-10-PCS | Mod: PBBFAC,,, | Performed by: ORTHOPAEDIC SURGERY

## 2019-04-02 PROCEDURE — 99212 OFFICE O/P EST SF 10 MIN: CPT | Mod: PBBFAC,PN | Performed by: ORTHOPAEDIC SURGERY

## 2019-04-02 PROCEDURE — 99024 POSTOP FOLLOW-UP VISIT: CPT | Mod: ,,, | Performed by: ORTHOPAEDIC SURGERY

## 2019-04-02 NOTE — PROGRESS NOTES
"Subjective:      Patient ID: Martha Sevilla is a 63 y.o. female.    Chief Complaint: Pain and Post-op Evaluation of the Left Knee      HPI:  2 weeks postop  The patient is seen for postop follow-up of left  TKA.  Pain control has been satisfactory  They feel that they are ambulating easily  Preoperative complaints include: na      Current Outpatient Medications:     amitriptyline (ELAVIL) 50 MG tablet, Take 1 tablet (50 mg total) by mouth nightly as needed for Insomnia or Pain., Disp: 30 tablet, Rfl: 2    aspirin (ECOTRIN) 81 MG EC tablet, Take 1 tablet (81 mg total) by mouth once daily., Disp: 100 tablet, Rfl: 0    ibuprofen (ADVIL,MOTRIN) 600 MG tablet, TK 1 T PO Q 6 H PRN. TK WF, Disp: , Rfl: 0    oxyCODONE-acetaminophen (PERCOCET) 5-325 mg per tablet, Fill this prescription 1st. Take 1 tablet by mouth every 4 hr as needed for pain, Disp: 28 tablet, Rfl: 0    oxyCODONE-acetaminophen (PERCOCET) 5-325 mg per tablet, Take 1 tablet by mouth every 6 (six) hours as needed for Pain. Fill this prescription if needed after your 1st 1 is done  Note pharmacy:  Recent major surgery-please allow early refill if patient requests, Disp: 40 tablet, Rfl: 0    promethazine (PHENERGAN) 25 MG tablet, Take 1 tablet (25 mg total) by mouth nightly as needed for Nausea., Disp: 30 tablet, Rfl: 0    triamcinolone acetonide 0.025% (KENALOG) 0.025 % cream, Apply topically 2 (two) times daily. Do not apply near incision, Disp: 30 g, Rfl: 0  Review of patient's allergies indicates:  No Known Allergies    Ht 5' 6" (1.676 m)   Wt 122.5 kg (270 lb)   LMP  (LMP Unknown)   BMI 43.58 kg/m²     ROS        Objective:    Ortho Exam          Alert, oriented, no acute distress  Sclera-Normal  Respiratory distress-none  Gait mild limp  Incision: normally healing  Range of motion: extension- 5 degrees; flexion- 90 degrees  Valgus/varus stability- stable  Swelling-mild    Assessment:     Imaging: normal tka        1. S/P left knee surgery  "       Normal postop progress        Plan:          No follow-ups on file.    NHx of recovery explained  Cont walker, PTx

## 2019-04-03 ENCOUNTER — CLINICAL SUPPORT (OUTPATIENT)
Dept: REHABILITATION | Facility: HOSPITAL | Age: 64
End: 2019-04-03
Attending: ORTHOPAEDIC SURGERY
Payer: MEDICAID

## 2019-04-03 DIAGNOSIS — R29.898 DECREASED STRENGTH OF LOWER EXTREMITY: ICD-10-CM

## 2019-04-03 DIAGNOSIS — M25.562 CHRONIC PAIN OF LEFT KNEE: Primary | ICD-10-CM

## 2019-04-03 DIAGNOSIS — M25.662 DECREASED RANGE OF MOTION (ROM) OF LEFT KNEE: ICD-10-CM

## 2019-04-03 DIAGNOSIS — G89.29 CHRONIC PAIN OF LEFT KNEE: Primary | ICD-10-CM

## 2019-04-03 DIAGNOSIS — R26.89 IMPAIRED GAIT AND MOBILITY: ICD-10-CM

## 2019-04-03 PROCEDURE — 97110 THERAPEUTIC EXERCISES: CPT | Mod: PN

## 2019-04-03 PROCEDURE — 97162 PT EVAL MOD COMPLEX 30 MIN: CPT | Mod: PN

## 2019-04-03 NOTE — PATIENT INSTRUCTIONS
Heel Prop      While seated, prop your foot up on another chair and allow gravity to stretch your knee towards a more straightened position.   *Can also assist by placing your hand just above the knee and gently pressing down toward the floor.    Hold 2-5 minutes. Perform 1 sessions each hour you are awake.      Hamstring Stretch      Sit in a chair with the heel of the involved leg propped on another chair. Keep your knee straight and lean forward until you feel a stretch in the back of your thigh.   Hold 30 seconds. Repeat 3 times per session. Perform 1 sessions each hour you are awake.      Ankle Pump    With left leg elevated, gently flex and extend ankle. Move through full range of motion. Avoid pain. Perform more frequently if having increased swelling.  Repeat 20 times each side per set. Do 2 sets per session. Perform 1 sessions each hour you are awake.      Heel Slides    With towel around left heel, gently pull knee up with towel until stretch is felt. Hold 10 seconds.  Repeat 10 times left leg per set. Do 1 sets per session. Do 4 sessions per day.      Strengthening: Quadriceps Set    Tighten muscles on top of thighs by pushing knees down into surface.   Hold 5 seconds. Repeat 10 times left leg per set. Do 2 sets per session. Do 2 sessions per day.      Extension, Short Arc Quads - Supine    Place bolster under knees. Raise one leg until knee is straight.  Repeat 10 times left leg per set. Do 2 sets per session. Do 2 sessions per day.      Extension, Long Arc Quads - Sitting      Raise leg until knee is straight.  Repeat 10 times left leg per set. Do 2 sets per session. Do 2 sessions per day.      Slow Stand to Sit    Stand up, using hands if needed. Keep chest and head upright, bend your knees, don't use hands, and slowly lower back to the chair. Move as slowly as you can.  Repeat 10 times per set. Do 2 sets per session. Do 1 sessions per day.    Copyright © [employer]. All rights reserved.

## 2019-04-03 NOTE — PLAN OF CARE
[facility]  Physical Therapy Initial Evaluation    Name: Martha Sevilla  Clinic Number: [id number]    Therapy Diagnosis:   Encounter Diagnoses   Name Primary?    Chronic pain of left knee Yes    Decreased range of motion (ROM) of left knee     Decreased strength of lower extremity     Impaired gait and mobility      Physician: Niraj Pérez MD    Physician Orders: PT Eval and Treat   Medical Diagnosis from Referral:   M17.12 (ICD-10-CM) - Arthritis of knee, left   Z96.652 (ICD-10-CM) - History of left knee replacement   Evaluation Date: 4/3/2019  Authorization Period Expiration: 3/18/2020  Plan of Care Expiration: 5/31/2019  Visit # / Visits authorized: 1/50  FOTO: 1/10    Time In: 1420  Time Out: 1214  Total Billable Time: 44 minutes (Moderate Complexity Evaluation, Therapeutic Exercise - 2)    Precautions: Standard, Standard    Subjective     Date of onset: DOS: 3/18/2019    History of current condition - Martha reports: she had a left TKA on 3/18/2019. Patient was discharged home that day. Patient reports no complications during or following surgery. Some L calf pain but no associated excess heat to area or fever. Patient reports home health physical therapy was ordered but never went to home; reports she has been performing exercises but struggles with overall knee mobility due to pain.      No past medical history on file.  Martha Sevilla  has a past surgical history that includes Tubal ligation; Evacuation of hematoma of leg (Right); and Knee Arthroplasty (Left, 3/18/2019).    Martha has a current medication list which includes the following prescription(s): amitriptyline, aspirin, ibuprofen, oxycodone-acetaminophen, oxycodone-acetaminophen, promethazine, and triamcinolone acetonide 0.025%.    Review of patient's allergies indicates:  No Known Allergies     Imaging, X-Ray L Knee 4/2/2019: There is a knee prosthesis in place with no evidence of hardware failure.   There is postoperative change within the overlying soft tissues.  There is satisfactory osseous alignment.    Prior Therapy: In hospital; home health ordered however did not show up  Social History: Patient lives with  in single story home with 4 steps to enter and unilateral hand rail. Patient uses husbands arm for support on side without handrail and performs step to ascent and descent  Occupation: Unemployed  Prior Level of Function: Independent; pain with weightbearing activities  Current Level of Function: Modified independent with RW; pain with knee ROM and weightbearing activities    Pain:  Current 8/10, worst 9/10, best 0/10   Location: Anterior aspect of L knee  Description: Ache  Aggravating Factors: Knee ROM; standing and walking  Easing Factors: Sitting and lying down    Pts goals: Walk without AD; function without pain    Objective     Posture: with RW: forward trunk lean, weight bearing on R LE and increased L knee flexion  Palpation: Moderate L patellar hypomobility in all directions; mild L patellar hypomobility in medial and superior directions, normal in lateral and inferior directions  Pain with L patellar mobility in all directions; tenderness to L quad, IT band, hamstring, and medial and lateral gastroc heads    AROM: *denotes pain  R knee: 0-122 degrees  L knee: 8-80 degrees *    PROM: *denotes pain  R knee: 0-125 degrees  L knee: 5-90 degrees *    L/E Strength w/ MicroFET Muscle Verna Dynamometer Right Left Pain/Dysfunction with Movement   (approx 4 sec hold w/ max contraction)   Hip Flexion 19.5 kg  12 kg Pain in L knee and calf   Hip Abduction 21.7 kg  10.4 kg     Quadriceps 16.4 kg  4.3 kg  Pain in L knee   Hamstrings 14.6 kg  15.1 kg       TU seconds (w/ RW)  30 second sit-to-stand test (without U/E support): 0 (7 w/ UE support)    Howard's sign: (-) B    Gait Analysis: with RW: decreased stance time on L LE with increased impact of R heel strike + forward trunk lean;  "decreased hip and knee flexion and foot clearance in swing phase B; increased L knee flexion in stance phase    CMS Impairment/Limitation/Restriction for FOTO Knee Survey    Therapist reviewed FOTO scores for Martha Sevilla on 4/3/2019.   FOTO documents entered into [employer] - see Media section.    Limitation Score: 72%  Category: Mobility     TREATMENT     Treatment Time In: 1440  Treatment Time Out: 1314  Total Treatment time separate from Evaluation: 34 minutes    Martha received therapeutic exercises to develop strength, ROM, flexibility, and posture for 24 minutes including:    Stationary bike: 5' partial to full backwards revolutions for increased knee flexion ROM    Heel prop: x2' L  Hamstring stretch in heel prop: x30" L  Quad sets: x10 L  Short arc quads: x10 L  Heel slides: 10"x10 L c/ straps and slide board  Long arc quads: x10 L    Martha received cold pack for 10 minutes to L knee to control for pain and inflammation.    Home Exercises and Patient Education Provided    Education provided:   - role of therapy  - HEP of therapeutic exercises performed above excluding stationary bike; importance of performance of extension based exercises for return to functional activities in standing and gait without AD.    Written Home Exercises Provided: yes.  Exercises were reviewed and Martha was able to demonstrate them prior to the end of the session.  Martha demonstrated good  understanding of the education provided.     See EMR under Patient Instructions for exercises provided 4/3/2019.    Assessment     Martha is a 63 y.o. female referred to outpatient Physical Therapy with a medical diagnosis of L knee OA. Pt presents to PT with primary impairments of pain, decreased knee extension ROM, decreased quad strength, and impaired gait. If unable to achieve functional to normal knee extension, patient would benefit from knee extension dynasplint to assist physical therapy interventions in restoring " extension range via sustained stretch. Lacking knee extension and associated pain to hamstring and gastroc limiting quad exercises and gait.    Pt prognosis is Good.   Pt will benefit from skilled outpatient Physical Therapy to address the deficits stated above and in the chart below, provide pt/family education, and to maximize pt's level of independence.     Plan of care discussed with patient: Yes  Pt's spiritual, cultural and educational needs considered and patient is agreeable to the plan of care and goals as stated below:     Anticipated Barriers for therapy: Knee flexion contracture; activity level; BMI    Medical Necessity is demonstrated by the following  History  Co-morbidities and personal factors that may impact the plan of care Co-morbidities:   BMI    Personal Factors:   lifestyle(Patient completed 20 minutes of exercise seldom or never)     moderate   Examination  Body Structures and Functions, activity limitations and participation restrictions that may impact the plan of care Body Regions:   upper extremities    Body Systems:    gross symmetry  ROM  strength  gross coordinated movement  balance  gait  transfers  transitions  motor control  motor learning    Participation Restrictions:   Community ambulation    Activity limitations:   Learning and applying knowledge  no deficits    General Tasks and Commands  no deficits    Communication  no deficits    Mobility  walking    Self care  dressing    Domestic Life  shopping  cooking  doing house work (cleaning house, washing dishes, laundry)    Interactions/Relationships  no deficits    Life Areas  no deficits    Community and Social Life  community life  recreation and leisure         high   Clinical Presentation evolving clinical presentation with changing clinical characteristics moderate   Decision Making/ Complexity Score: moderate     Goals:  Short Term Goals:  1. Pt will be independent with HEP supplement PT in improving functional mobility.  2.  "Pt will improve L LE strength to at least 75% of R LE strength as measured via MicroFet handheld dynamometer in order to improve functional mobility  3. Pt will improve L knee AROM to at least 5-100 in order to improve gait  Long Term Goals:  1. Pt will improve L LE strength to at least 90% of R LE strength as measured via MicroFet handheld dynamometer in order to improve functional mobility  2. Pt will improve L knee AROM to at least 3-120 in order to improve gait and ability to perform ADLs  3. Pt will improve FOTO knee survey score to </= 44% limited in order to demo improved functional mobility  4. Pt will perform TUG in < 10 seconds without AD in order to demo improved gait speed  5. Pt will perform at least 10 sit to stands without UE support on 30 second sit to stand test in order to demo improved ability to perform transfers    TUG Cutoff Scores:        30" sit to stand Cutoff Scores:          Plan     Plan of care Certification: 4/3/2019 to 5/31/2019.    Outpatient Physical Therapy 3 times weekly for 8 weeks to include the following interventions: Gait Training, Manual Therapy, Moist Heat/ Ice, Neuromuscular Re-ed, Orthotic Management and Training, Patient Education, Therapeutic Activites and Therapeutic Exercise, ASTYM, Kinesiotape    Prerna Sharma, PT, DPT      "

## 2019-04-05 ENCOUNTER — CLINICAL SUPPORT (OUTPATIENT)
Dept: REHABILITATION | Facility: HOSPITAL | Age: 64
End: 2019-04-05
Attending: ORTHOPAEDIC SURGERY
Payer: MEDICAID

## 2019-04-05 DIAGNOSIS — R26.89 IMPAIRED GAIT AND MOBILITY: ICD-10-CM

## 2019-04-05 DIAGNOSIS — G89.29 CHRONIC PAIN OF LEFT KNEE: ICD-10-CM

## 2019-04-05 DIAGNOSIS — R29.898 DECREASED STRENGTH OF LOWER EXTREMITY: ICD-10-CM

## 2019-04-05 DIAGNOSIS — M25.562 CHRONIC PAIN OF LEFT KNEE: ICD-10-CM

## 2019-04-05 DIAGNOSIS — M25.662 DECREASED RANGE OF MOTION (ROM) OF LEFT KNEE: ICD-10-CM

## 2019-04-05 PROCEDURE — 97110 THERAPEUTIC EXERCISES: CPT | Mod: PN

## 2019-04-07 ENCOUNTER — HOSPITAL ENCOUNTER (EMERGENCY)
Facility: HOSPITAL | Age: 64
Discharge: HOME | End: 2019-04-08
Attending: EMERGENCY MEDICINE
Payer: MEDICAID

## 2019-04-07 DIAGNOSIS — R07.9 CHEST PAIN: Primary | ICD-10-CM

## 2019-04-07 LAB
ALBUMIN SERPL BCP-MCNC: 4.6 G/DL (ref 3.5–5.2)
ALP SERPL-CCNC: 107 U/L (ref 38–126)
ALT SERPL W/O P-5'-P-CCNC: 18 U/L (ref 10–44)
ANION GAP SERPL CALC-SCNC: 8 MMOL/L (ref 8–16)
AST SERPL-CCNC: 25 U/L (ref 15–46)
BASOPHILS # BLD AUTO: 0.08 K/UL (ref 0–0.2)
BASOPHILS NFR BLD: 1 % (ref 0–1.9)
BILIRUB SERPL-MCNC: 0.6 MG/DL (ref 0.1–1)
BUN SERPL-MCNC: 18 MG/DL (ref 7–17)
CALCIUM SERPL-MCNC: 9.2 MG/DL (ref 8.7–10.5)
CHLORIDE SERPL-SCNC: 96 MMOL/L (ref 95–110)
CO2 SERPL-SCNC: 33 MMOL/L (ref 23–29)
CREAT SERPL-MCNC: 0.79 MG/DL (ref 0.5–1.4)
DIFFERENTIAL METHOD: NORMAL
EOSINOPHIL # BLD AUTO: 0.1 K/UL (ref 0–0.5)
EOSINOPHIL NFR BLD: 1.7 % (ref 0–8)
ERYTHROCYTE [DISTWIDTH] IN BLOOD BY AUTOMATED COUNT: 13.2 % (ref 11.5–14.5)
EST. GFR  (AFRICAN AMERICAN): >60 ML/MIN/1.73 M^2
EST. GFR  (NON AFRICAN AMERICAN): >60 ML/MIN/1.73 M^2
GLUCOSE SERPL-MCNC: 130 MG/DL (ref 70–110)
HCT VFR BLD AUTO: 43.3 % (ref 37–48.5)
HGB BLD-MCNC: 13.9 G/DL (ref 12–16)
INR PPP: 1.2 (ref 0.8–1.2)
LYMPHOCYTES # BLD AUTO: 2.3 K/UL (ref 1–4.8)
LYMPHOCYTES NFR BLD: 26.9 % (ref 18–48)
MCH RBC QN AUTO: 30.5 PG (ref 27–31)
MCHC RBC AUTO-ENTMCNC: 32.1 G/DL (ref 32–36)
MCV RBC AUTO: 95 FL (ref 82–98)
MONOCYTES # BLD AUTO: 0.7 K/UL (ref 0.3–1)
MONOCYTES NFR BLD: 8 % (ref 4–15)
NEUTROPHILS # BLD AUTO: 5.2 K/UL (ref 1.8–7.7)
NEUTROPHILS NFR BLD: 62.3 % (ref 38–73)
NT-PROBNP: 91 PG/ML (ref 5–900)
PLATELET # BLD AUTO: 255 K/UL (ref 150–350)
PMV BLD AUTO: 9.9 FL (ref 9.2–12.9)
POTASSIUM SERPL-SCNC: 3.9 MMOL/L (ref 3.5–5.1)
PROT SERPL-MCNC: 9.1 G/DL (ref 6–8.4)
PROTHROMBIN TIME: 13 SEC (ref 9–12.5)
RBC # BLD AUTO: 4.56 M/UL (ref 4–5.4)
SODIUM SERPL-SCNC: 137 MMOL/L (ref 136–145)
TROPONIN I SERPL DL<=0.01 NG/ML-MCNC: <0.012 NG/ML (ref 0.01–0.03)
WBC # BLD AUTO: 8.37 K/UL (ref 3.9–12.7)

## 2019-04-07 PROCEDURE — 85610 PROTHROMBIN TIME: CPT | Mod: ER

## 2019-04-07 PROCEDURE — 93010 EKG 12-LEAD: ICD-10-PCS | Mod: ,,, | Performed by: INTERNAL MEDICINE

## 2019-04-07 PROCEDURE — 99283 EMERGENCY DEPT VISIT LOW MDM: CPT | Mod: ER

## 2019-04-07 PROCEDURE — 83880 ASSAY OF NATRIURETIC PEPTIDE: CPT | Mod: ER

## 2019-04-07 PROCEDURE — 93010 ELECTROCARDIOGRAM REPORT: CPT | Mod: ,,, | Performed by: INTERNAL MEDICINE

## 2019-04-07 PROCEDURE — 93005 ELECTROCARDIOGRAM TRACING: CPT | Mod: ER

## 2019-04-07 PROCEDURE — 84484 ASSAY OF TROPONIN QUANT: CPT | Mod: ER

## 2019-04-07 PROCEDURE — 85025 COMPLETE CBC W/AUTO DIFF WBC: CPT | Mod: ER

## 2019-04-07 PROCEDURE — 25000003 PHARM REV CODE 250: Mod: ER | Performed by: EMERGENCY MEDICINE

## 2019-04-07 PROCEDURE — 80053 COMPREHEN METABOLIC PANEL: CPT | Mod: ER

## 2019-04-07 RX ORDER — NAPROXEN SODIUM 220 MG/1
324 TABLET, FILM COATED ORAL ONCE
Status: COMPLETED | OUTPATIENT
Start: 2019-04-07 | End: 2019-04-07

## 2019-04-07 RX ADMIN — ASPIRIN 81 MG 324 MG: 81 TABLET ORAL at 10:04

## 2019-04-08 ENCOUNTER — CLINICAL SUPPORT (OUTPATIENT)
Dept: REHABILITATION | Facility: HOSPITAL | Age: 64
End: 2019-04-08
Attending: ORTHOPAEDIC SURGERY
Payer: MEDICAID

## 2019-04-08 VITALS
TEMPERATURE: 98 F | HEIGHT: 67 IN | BODY MASS INDEX: 42.38 KG/M2 | OXYGEN SATURATION: 97 % | WEIGHT: 270 LBS | HEART RATE: 92 BPM | SYSTOLIC BLOOD PRESSURE: 179 MMHG | RESPIRATION RATE: 21 BRPM | DIASTOLIC BLOOD PRESSURE: 89 MMHG

## 2019-04-08 DIAGNOSIS — M25.562 CHRONIC PAIN OF LEFT KNEE: ICD-10-CM

## 2019-04-08 DIAGNOSIS — G89.29 CHRONIC PAIN OF LEFT KNEE: ICD-10-CM

## 2019-04-08 DIAGNOSIS — R29.898 DECREASED STRENGTH OF LOWER EXTREMITY: ICD-10-CM

## 2019-04-08 DIAGNOSIS — R26.89 IMPAIRED GAIT AND MOBILITY: ICD-10-CM

## 2019-04-08 DIAGNOSIS — M25.662 DECREASED RANGE OF MOTION (ROM) OF LEFT KNEE: ICD-10-CM

## 2019-04-08 LAB — TROPONIN I SERPL DL<=0.01 NG/ML-MCNC: <0.012 NG/ML (ref 0.01–0.03)

## 2019-04-08 PROCEDURE — 84484 ASSAY OF TROPONIN QUANT: CPT | Mod: ER

## 2019-04-08 PROCEDURE — 97110 THERAPEUTIC EXERCISES: CPT | Mod: PN

## 2019-04-08 NOTE — ED PROVIDER NOTES
Encounter Date: 4/7/2019       History     Chief Complaint   Patient presents with    Chest Pain     c/o mid-sternal aching chest pain, no nausea or vomiting, does not radiate to any other area; pt states pain started while ambulating at home, nothing eases pain, nothing exacerbates; Pt took one 81 mg ASA prior to arrival     The history is provided by the patient.   Chest Pain   The current episode started 2 to 3 hours ago. Duration of episode(s) is 10 minutes. Chest pain occurs intermittently. The chest pain is resolved. At its most intense, the chest pain is at 5/10. The chest pain is currently at 0/10. The quality of the pain is described as aching. The pain does not radiate. Pertinent negatives for primary symptoms include no fever, no fatigue, no syncope, no shortness of breath, no cough, no wheezing, no palpitations, no abdominal pain, no nausea, no vomiting, no dizziness and no altered mental status.   Pertinent negatives for associated symptoms include no claudication, no diaphoresis, no lower extremity edema, no near-syncope, no numbness, no orthopnea, no paroxysmal nocturnal dyspnea and no weakness. She tried nothing for the symptoms. There are no known risk factors.   Pertinent negatives for past medical history include no CAD, no diabetes, no hyperlipidemia, no hypertension and no MI.     Review of patient's allergies indicates:  No Known Allergies  History reviewed. No pertinent past medical history.  Past Surgical History:   Procedure Laterality Date    ARTHROPLASTY, KNEE left Left 3/18/2019    Performed by Niraj Pérez MD at [facility] OR    Evacuation of hematoma of leg Right     TUBAL LIGATION       History reviewed. No pertinent family history.  Social History     Tobacco Use    Smoking status: Never Smoker    Smokeless tobacco: Never Used   Substance Use Topics    Alcohol use: No    Drug use: No     Review of Systems   Constitutional: Negative for diaphoresis, fatigue and fever.    Respiratory: Negative for cough, shortness of breath and wheezing.    Cardiovascular: Positive for chest pain. Negative for palpitations, orthopnea, claudication, syncope and near-syncope.   Gastrointestinal: Negative for abdominal pain, nausea and vomiting.   Neurological: Negative for dizziness, weakness and numbness.   All other systems reviewed and are negative.      Physical Exam     Initial Vitals [04/07/19 2002]   BP Pulse Resp Temp SpO2   (!) 188/99 (!) 114 18 98 °F (36.7 °C) 98 %      MAP       --         Physical Exam    Nursing note and vitals reviewed.  Constitutional: She appears well-developed and well-nourished.   HENT:   Head: Normocephalic and atraumatic.   Eyes: Conjunctivae and EOM are normal.   Neck: Normal range of motion. Neck supple.   Cardiovascular: Normal rate, regular rhythm and normal heart sounds.   Pulmonary/Chest: Breath sounds normal. She has no wheezes. She has no rhonchi. She has no rales.   Abdominal: Soft. There is no tenderness. There is no rebound and no guarding.   Musculoskeletal: Normal range of motion.   Neurological: She is alert and oriented to person, place, and time. GCS score is 15. GCS eye subscore is 4. GCS verbal subscore is 5. GCS motor subscore is 6.   Skin: Skin is warm and dry. Capillary refill takes less than 2 seconds.   Psychiatric: She has a normal mood and affect. Her behavior is normal. Judgment and thought content normal.         ED Course   Procedures  Labs Reviewed   COMPREHENSIVE METABOLIC PANEL - Abnormal; Notable for the following components:       Result Value    CO2 33 (*)     Glucose 130 (*)     BUN, Bld 18 (*)     Total Protein 9.1 (*)     All other components within normal limits   PROTIME-INR - Abnormal; Notable for the following components:    Prothrombin Time 13.0 (*)     All other components within normal limits   CBC W/ AUTO DIFFERENTIAL   TROPONIN I   NT-PRO NATRIURETIC PEPTIDE   TROPONIN I     EKG Readings: (Independently Interpreted)    Initial Reading: No STEMI. Rhythm: Sinus Tachycardia. Heart Rate: 110. ST Segments: Normal ST Segments. T Waves: Normal.       Imaging Results          X-Ray Chest PA And Lateral (Final result)  Result time 04/07/19 20:20:26    Final result by Emil Ca MD (04/07/19 20:20:26)                 Impression:      No acute cardiopulmonary disease.      Electronically signed by: Emil Ca MD  Date:    04/07/2019  Time:    20:20             Narrative:    EXAMINATION:  XR CHEST PA AND LATERAL    CLINICAL HISTORY:  Chest Pain;    TECHNIQUE:  PA and lateral views of the chest were performed.    COMPARISON:  None    FINDINGS:  Coarsening of bronchovascular markings/COPD.  No pleural effusion or pneumothorax.    The cardiac silhouette is normal in size. The hilar and mediastinal contours are unremarkable.                              X-Rays:   Independently Interpreted Readings:   Chest X-Ray: Normal heart size.  No infiltrates.  No acute abnormalities.     Medical Decision Making:   Clinical Tests:   Lab Tests: Ordered and Reviewed  Radiological Study: Ordered and Reviewed  Medical Tests: Ordered and Reviewed                      Clinical Impression:       ICD-10-CM ICD-9-CM   1. Chest pain R07.9 786.50         Disposition:   Disposition: Discharged  Condition: Stable                        Tamy Phillips MD  04/08/19 0148

## 2019-04-08 NOTE — PROGRESS NOTES
Physical Therapy Daily Treatment Note     Name: Martha Sevilla  Clinic Number: [id number]    Therapy Diagnosis:   No diagnosis found.  Physician: Niraj Pérez MD    Visit Date: 4/8/2019    Physician Orders: PT Eval and Treat   Medical Diagnosis from Referral:   M17.12 (ICD-10-CM) - Arthritis of knee, left   Z96.652 (ICD-10-CM) - History of left knee replacement   Evaluation Date: 4/3/2019  Authorization Period Expiration: 3/18/2020  Plan of Care Expiration: 5/31/2019  Visit # / Visits authorized: 3/50  FOTO: 3/10     Time In: 0***  Time Out: ***  Total Billable Time: *** minutes     Precautions: Standard, Fall    Subjective     Mrs. Sevilla presents to PT today for her first f/u appt.  Ambulating with RW with heavy reliance on this assistive device.  Primary complaint is L calf pain.   She was compliant with home exercise program.  Response to previous treatment: eval only   Functional change: none at this time mentioned.     Pain: ***/10  Location: Lknee      Objective     Martha received therapeutic exercises to develop strength, endurance, ROM, flexibility, posture and core stabilization for *** minutes 1:1 including assessment and *** minutes supervised:  O: L knee PROM: (-) 6 - 104 degrees  - quad sets    20 reps with towel roll (cueing)  - short arc quads   3x10   - long arc quads   3x10 with 3# ankle weight.  - SLR     A/AAROM 3x10 LLE  - sideying hip abduction  3x10 LLE  - fitter stretch    3x30 seconds  - HSS at stairs    3x30 seconds LLE  - heel slides with strap  10 reps x 10 second holds    Martha received the following manual therapy techniques to L knee complex for *** minutes:  - grade III/IV patellar mobilizations  - grade III/IV L knee passive physiological flexion and extension  - STM/MFR to proximal gastrocnemius/ distal HS complexes    Martha received cold pack for 10 minutes to L knee while on a knee extension creep stretch.    Home Exercises Provided and Patient Education  Provided   Education provided:   - Patient instructed to cont prior HEP.    Written Home Exercises Provided: ***  Exercises were reviewed and Martha was able to demonstrate them prior to the end of the session.  Martha demonstrated good  understanding of the education provided.     See EMR under Patient Instructions for exercises provided 04/03/2019.    Assessment     A:  S/p L TKA.  Post-op week 2-3.     Martha is progressing well towards her goals.   Pt prognosis is Good.  Pt will continue to benefit from skilled outpatient physical therapy to address the deficits listed in the problem list box on initial evaluation, provide pt/family education and to maximize pt's level of independence in the home and community environment.     Pt's spiritual, cultural and educational needs considered and pt agreeable to plan of care and goals.  Anticipated barriers to physical therapy: co-morbidities    Goals:   Short Term Goals:  1. Pt will be independent with HEP supplement PT in improving functional mobility. PROGRESSING, NOT MET 4/8/2019   2. Pt will improve L LE strength to at least 75% of R LE strength as measured via MicroFet handheld dynamometer in order to improve functional mobility PROGRESSING, NOT MET 4/8/2019   3. Pt will improve L knee AROM to at least 5-100 in order to improve gait PROGRESSING, NOT MET 4/8/2019   Long Term Goals:  1. Pt will improve L LE strength to at least 90% of R LE strength as measured via MicroFet handheld dynamometer in order to improve functional mobility PROGRESSING, NOT MET 4/8/2019   2. Pt will improve L knee AROM to at least 3-120 in order to improve gait and ability to perform ADLs PROGRESSING, NOT MET 4/8/2019   3. Pt will improve FOTO knee survey score to </= 44% limited in order to demo improved functional mobility PROGRESSING, NOT MET 4/8/2019   4. Pt will perform TUG in < 10 seconds without AD in order to demo improved gait speed PROGRESSING, NOT MET 4/8/2019   5. Pt  will perform at least 10 sit to stands without UE support on 30 second sit to stand test in order to demo improved ability to perform transfers PROGRESSING, NOT MET 4/8/2019     Plan     Phase I/II of post-op L TKA rehab program.     Prerna Sharma, PT

## 2019-04-08 NOTE — PROGRESS NOTES
"  Physical Therapy Daily Treatment Note     Name: Martha Sevilla  Clinic Number: [id number]    Therapy Diagnosis:   Encounter Diagnoses   Name Primary?    Chronic pain of left knee     Decreased range of motion (ROM) of left knee     Decreased strength of lower extremity     Impaired gait and mobility      Physician: Niraj Pérez MD    Visit Date: 4/8/2019    Physician Orders: PT Eval and Treat   Medical Diagnosis from Referral:   M17.12 (ICD-10-CM) - Arthritis of knee, left   Z96.652 (ICD-10-CM) - History of left knee replacement   Evaluation Date: 4/3/2019  Authorization Period Expiration: 3/18/2020  Plan of Care Expiration: 5/31/2019  Visit # / Visits authorized: 3/50  FOTO: 3/10     Time In: 1100  Time Out: 1208  Total Billable Time: 58 minutes    Precautions: Standard, Fall    Subjective     Mrs. Sevilla reports: improved calf pain after last visit.  She was compliant with home exercise program.  Response to previous treatment: soreness; decreased calf pain  Functional change: improved tolerance to walking.    Pain: not rated  Location: L knee      Objective     Martha received therapeutic exercises to develop strength, endurance, ROM, flexibility, posture and core stabilization for 48 minutes 1:1 including:    - quad sets    x20 L c/ towel roll under knee  - short arc quads   3x10   - long arc quads   3x10 c/ 3# ankle weight.  - SLR     3x10 L; cueing for lift height  - sideying hip abduction  3x10 L  - heel slides with strap  10x10" L c/ strap    - fitter gastroc stretch   3x30" double leg  - stair hamstring stretch  3x30" L  - marching:     x10 B  - hip abduction:   x10 B  - hip extension:   x10 B    Martha received the following manual therapy techniques to L LE for 10 minutes:  - grade III/IV patellar mobilizations  - grade III/IV L knee passive physiological extension  - soft tissue mobilization to hip adductor group    Martha received cold pack for 10 minutes to L knee while on a knee " extension creep stretch.    Home Exercises Provided and Patient Education Provided   Education provided:   - Patient instructed to cont prior HEP.    Written Home Exercises Provided: No.  Exercises were reviewed and Martha was able to demonstrate them prior to the end of the session.  Martha demonstrated good  understanding of the education provided.     See EMR under Patient Instructions for exercises provided 04/03/2019.    Assessment     Patient 3 weeks post op R TKA. Knee range improving. Difficulty lifting L LE during SLR. Fatigue with addition of standing exercises.    Martha is progressing well towards her goals.   Pt prognosis is Good.   Pt will continue to benefit from skilled outpatient physical therapy to address the deficits listed in the problem list box on initial evaluation, provide pt/family education and to maximize pt's level of independence in the home and community environment.     Pt's spiritual, cultural and educational needs considered and pt agreeable to plan of care and goals.  Anticipated barriers to physical therapy: co-morbidities    Goals:   Short Term Goals:  1. Pt will be independent with HEP supplement PT in improving functional mobility.  2. Pt will improve L LE strength to at least 75% of R LE strength as measured via MicroFet handheld dynamometer in order to improve functional mobility  3. Pt will improve L knee AROM to at least 5-100 in order to improve gait  Long Term Goals:  1. Pt will improve L LE strength to at least 90% of R LE strength as measured via MicroFet handheld dynamometer in order to improve functional mobility  2. Pt will improve L knee AROM to at least 3-120 in order to improve gait and ability to perform ADLs  3. Pt will improve FOTO knee survey score to </= 44% limited in order to demo improved functional mobility  4. Pt will perform TUG in < 10 seconds without AD in order to demo improved gait speed  5. Pt will perform at least 10 sit to stands without  UE support on 30 second sit to stand test in order to demo improved ability to perform transfers    Plan     Continue plan of care. Monitor gait pattern and calf pain. Add stationary bike and TKE next.     Prerna Sharma, PT

## 2019-04-10 ENCOUNTER — CLINICAL SUPPORT (OUTPATIENT)
Dept: REHABILITATION | Facility: HOSPITAL | Age: 64
End: 2019-04-10
Attending: ORTHOPAEDIC SURGERY
Payer: MEDICAID

## 2019-04-10 DIAGNOSIS — M25.562 CHRONIC PAIN OF LEFT KNEE: ICD-10-CM

## 2019-04-10 DIAGNOSIS — R26.89 IMPAIRED GAIT AND MOBILITY: ICD-10-CM

## 2019-04-10 DIAGNOSIS — R29.898 DECREASED STRENGTH OF LOWER EXTREMITY: ICD-10-CM

## 2019-04-10 DIAGNOSIS — M25.662 DECREASED RANGE OF MOTION (ROM) OF LEFT KNEE: ICD-10-CM

## 2019-04-10 DIAGNOSIS — G89.29 CHRONIC PAIN OF LEFT KNEE: ICD-10-CM

## 2019-04-10 PROCEDURE — 97110 THERAPEUTIC EXERCISES: CPT | Mod: PN

## 2019-04-10 NOTE — PROGRESS NOTES
"  Physical Therapy Daily Treatment Note     Name: Martha Sevilla  Clinic Number: [id number]    Therapy Diagnosis:   Encounter Diagnoses   Name Primary?    Chronic pain of left knee     Decreased range of motion (ROM) of left knee     Decreased strength of lower extremity     Impaired gait and mobility      Physician: Niraj Pérez MD    Visit Date: 4/10/2019    Physician Orders: PT Eval and Treat   Medical Diagnosis from Referral:   M17.12 (ICD-10-CM) - Arthritis of knee, left   Z96.652 (ICD-10-CM) - History of left knee replacement   Evaluation Date: 4/3/2019  Authorization Period Expiration: 3/18/2020  Plan of Care Expiration: 5/31/2019  Visit # / Visits authorized: 4/50  FOTO: 4/5 next     Time In: 1106  Time Out: 1157  Total Billable Time: 13 minutes    Precautions: Standard, Fall    Subjective     Mrs. Sevilla reports: increased knee pain impacting sleep last night  She was compliant with home exercise program.  Response to previous treatment: improving knee motion  Functional change: increased ease of gait with rolling walker    Pain: 6/10  Location: L knee      Objective     Martha received therapeutic exercises to develop strength, endurance, ROM, flexibility, posture and core stabilization for 32 minutes supervised and 3 minutes 1:1 including:    Stationary bike for 5 minutes with full backwards revolutions for increased knee flexion ROM    - quad sets    x20 L c/ towel roll under knee  - SLR     3x10 B  - heel slides with strap  10x10" L c/ straps  - sideying hip abduction  1# 3x10 B  - long arc quads   3x10 c/ 3# ankle weight.    - fitter gastroc stretch   3x30" double leg  - stair hamstring stretch  3x30" L  - TKE     5"x10 against therapist resistance for isolated quad activation  - marching:     Out of time, next  - hip abduction:   Out of time, next  - hip extension:   Out of time, next    Martha received the following manual therapy techniques to L LE for 10 minutes:  - retrograde " edema reduction  - soft tissue mobilization to medial and lateral aspect of knee and scar, quad, hamstring, and gastroc   - grade III/IV patellar mobilizations in all directions    Home Exercises Provided and Patient Education Provided   Education provided:   - Patient instructed to cont prior HEP.    Written Home Exercises Provided: No.  Exercises were reviewed and Martha was able to demonstrate them prior to the end of the session.  Martha demonstrated good  understanding of the education provided.     See EMR under Patient Instructions for exercises provided 04/03/2019.    Assessment     Patient 3 weeks post op R TKA. Knee range continues to improve; able to achieve full backwards revolutions on bike today. Cues required for isolated quad activation during TKE.    Martha is progressing well towards her goals.   Pt prognosis is Good.   Pt will continue to benefit from skilled outpatient physical therapy to address the deficits listed in the problem list box on initial evaluation, provide pt/family education and to maximize pt's level of independence in the home and community environment.     Pt's spiritual, cultural and educational needs considered and pt agreeable to plan of care and goals.  Anticipated barriers to physical therapy: co-morbidities    Goals:   Short Term Goals:  1. Pt will be independent with HEP supplement PT in improving functional mobility. PROGRESSING, NOT MET 4/10/2019   2. Pt will improve L LE strength to at least 75% of R LE strength as measured via MicroFet handheld dynamometer in order to improve functional mobility PROGRESSING, NOT MET 4/10/2019   3. Pt will improve L knee AROM to at least 5-100 in order to improve gait PROGRESSING, NOT MET 4/10/2019   Long Term Goals:  1. Pt will improve L LE strength to at least 90% of R LE strength as measured via MicroFet handheld dynamometer in order to improve functional mobility PROGRESSING, NOT MET 4/10/2019   2. Pt will improve L knee  AROM to at least 3-120 in order to improve gait and ability to perform ADLs PROGRESSING, NOT MET 4/10/2019   3. Pt will improve FOTO knee survey score to </= 44% limited in order to demo improved functional mobility PROGRESSING, NOT MET 4/10/2019   4. Pt will perform TUG in < 10 seconds without AD in order to demo improved gait speed PROGRESSING, NOT MET 4/10/2019   5. Pt will perform at least 10 sit to stands without UE support on 30 second sit to stand test in order to demo improved ability to perform transfers PROGRESSING, NOT MET 4/10/2019     Plan     Continue plan of care. Resume steamboats next.    Prerna Sharma, PT

## 2019-04-11 NOTE — PROGRESS NOTES
"  Physical Therapy Daily Treatment Note     Name: Martha Sevilla  Clinic Number: [id number]    Therapy Diagnosis:   Encounter Diagnoses   Name Primary?    Chronic pain of left knee     Decreased range of motion (ROM) of left knee     Decreased strength of lower extremity     Impaired gait and mobility      Physician: Niraj Pérez MD    Visit Date: 4/12/2019    Physician Orders: PT Eval and Treat   Medical Diagnosis from Referral:   M17.12 (ICD-10-CM) - Arthritis of knee, left   Z96.652 (ICD-10-CM) - History of left knee replacement   Evaluation Date: 4/3/2019  Authorization Period Expiration: 3/18/2020  Plan of Care Expiration: 5/31/2019  Visit # / Visits authorized: 5/50  FOTO: 5/5 DONE     Time In: 1100  Time Out: 1210  Total Billable Time: 60 minutes    Precautions: Standard, Fall    Subjective     Mrs. Sevilla reports: "I can tell its getting better - but at night gets me"    She was compliant with home exercise program.  Response to previous treatment: improving knee motion  Functional change: increased ease of gait with rolling walker    Pain: 4-5/10  Location: L knee      Objective     Martha received therapeutic exercises to develop strength, endurance, ROM, flexibility, posture and core stabilization for 40 minutes 1:1 including:    Stationary bike for 5 minutes with 3 or 4 backwards revolutions then full for the remainder of time working on increased knee flexion ROM    - quad sets    5"x20 L c/ towel roll under knee  - SLR     3x10 B  - heel slides with strap  10x10" L c/ straps  - sideying hip abduction  1# 3x10 B  - long arc quads   3x10 c/ 3# ankle weight.    - fitter gastroc stretch   3x30" double leg  - stair hamstring stretch  3x30" L  - TKE     5"x10 against therapist resistance for isolated quad activation  - marching:     2x10 B  - hip abduction:   2x10 B  - hip extension:   2x10 B    A/PROM left knee after stretching 10*-102*/5*-110*    Martha received the following manual " "therapy techniques to L LE for 10 minutes:  - retrograde edema reduction  - soft tissue mobilization to medial and lateral aspect of knee and scar, quad, hamstring, and gastroc   - grade III/IV patellar mobilizations in all directions    Gait training x 10 minutes including working on weight shifting over left with hold, then with right swing x 10 each. Practiced ambulating along ballet bar with R UE support 4 x 10 feet with focus on left weight bearing before  Right swing.  Ambulated with RW practicing increased weight shift as before.  Demonstrated fair understanding.    Cold pack to anterior/posterior left knee with elevation to promote extension on stool x 5 minutes to end session.    Home Exercises Provided and Patient Education Provided   Education provided:   - Patient instructed to cont prior HEP.    Written Home Exercises Provided: No.  Exercises were reviewed and Martha was able to demonstrate them prior to the end of the session.  Martha demonstrated good  understanding of the education provided.     See EMR under Patient Instructions for exercises provided 04/03/2019.    Assessment     Patient able to achieve full forward revolutions on bike today.  Able to complete all therex as noted including increased reps as noted with minimal complaint of pain which resolved after cold pack.  Patient hesitant to fully weight shift over left with gait trial.  Demonstrated fair understanding and technique with same. Needs review and practice.  States "better now" after session.  Not specific to scale.    Martha is progressing well towards her goals.   Pt prognosis is Good.   Pt will continue to benefit from skilled outpatient physical therapy to address the deficits listed in the problem list box on initial evaluation, provide pt/family education and to maximize pt's level of independence in the home and community environment.     Pt's spiritual, cultural and educational needs considered and pt agreeable to " plan of care and goals.  Anticipated barriers to physical therapy: co-morbidities    Goals:   Short Term Goals:  1. Pt will be independent with HEP supplement PT in improving functional mobility. PROGRESSING, NOT MET 4/12/2019   2. Pt will improve L LE strength to at least 75% of R LE strength as measured via MicroFet handheld dynamometer in order to improve functional mobility PROGRESSING, NOT MET 4/12/2019   3. Pt will improve L knee AROM to at least 5-100 in order to improve gait PROGRESSING, NOT MET 4/12/2019   Long Term Goals:  1. Pt will improve L LE strength to at least 90% of R LE strength as measured via MicroFet handheld dynamometer in order to improve functional mobility PROGRESSING, NOT MET 4/12/2019   2. Pt will improve L knee AROM to at least 3-120 in order to improve gait and ability to perform ADLs PROGRESSING, NOT MET 4/12/2019   3. Pt will improve FOTO knee survey score to </= 44% limited in order to demo improved functional mobility PROGRESSING, NOT MET 4/12/2019   4. Pt will perform TUG in < 10 seconds without AD in order to demo improved gait speed PROGRESSING, NOT MET 4/12/2019   5. Pt will perform at least 10 sit to stands without UE support on 30 second sit to stand test in order to demo improved ability to perform transfers PROGRESSING, NOT MET 4/12/2019     Plan     Continue plan of care. Progress with gait next visit..    Yolanda Eric, PTA

## 2019-04-12 ENCOUNTER — CLINICAL SUPPORT (OUTPATIENT)
Dept: REHABILITATION | Facility: HOSPITAL | Age: 64
End: 2019-04-12
Attending: ORTHOPAEDIC SURGERY
Payer: MEDICAID

## 2019-04-12 DIAGNOSIS — R29.898 DECREASED STRENGTH OF LOWER EXTREMITY: ICD-10-CM

## 2019-04-12 DIAGNOSIS — M25.662 DECREASED RANGE OF MOTION (ROM) OF LEFT KNEE: ICD-10-CM

## 2019-04-12 DIAGNOSIS — M25.562 CHRONIC PAIN OF LEFT KNEE: ICD-10-CM

## 2019-04-12 DIAGNOSIS — G89.29 CHRONIC PAIN OF LEFT KNEE: ICD-10-CM

## 2019-04-12 DIAGNOSIS — R26.89 IMPAIRED GAIT AND MOBILITY: ICD-10-CM

## 2019-04-12 PROCEDURE — 97110 THERAPEUTIC EXERCISES: CPT | Mod: PN

## 2019-04-15 ENCOUNTER — CLINICAL SUPPORT (OUTPATIENT)
Dept: REHABILITATION | Facility: HOSPITAL | Age: 64
End: 2019-04-15
Attending: ORTHOPAEDIC SURGERY
Payer: MEDICAID

## 2019-04-15 DIAGNOSIS — M25.562 CHRONIC PAIN OF LEFT KNEE: ICD-10-CM

## 2019-04-15 DIAGNOSIS — R29.898 DECREASED STRENGTH OF LOWER EXTREMITY: ICD-10-CM

## 2019-04-15 DIAGNOSIS — G89.29 CHRONIC PAIN OF LEFT KNEE: ICD-10-CM

## 2019-04-15 DIAGNOSIS — R26.89 IMPAIRED GAIT AND MOBILITY: ICD-10-CM

## 2019-04-15 DIAGNOSIS — M25.662 DECREASED RANGE OF MOTION (ROM) OF LEFT KNEE: ICD-10-CM

## 2019-04-15 PROCEDURE — 97140 MANUAL THERAPY 1/> REGIONS: CPT | Mod: PN

## 2019-04-15 PROCEDURE — 97110 THERAPEUTIC EXERCISES: CPT | Mod: PN

## 2019-04-15 NOTE — PROGRESS NOTES
"  Physical Therapy Daily Treatment Note     Name: Martha Sevilla  Clinic Number: [id number]    Therapy Diagnosis:   Encounter Diagnoses   Name Primary?    Chronic pain of left knee     Decreased range of motion (ROM) of left knee     Decreased strength of lower extremity     Impaired gait and mobility      Physician: Niraj Pérez MD    Visit Date: 4/15/2019    Physician Orders: PT Eval and Treat   Medical Diagnosis from Referral:   M17.12 (ICD-10-CM) - Arthritis of knee, left   Z96.652 (ICD-10-CM) - History of left knee replacement   Evaluation Date: 4/3/2019  Authorization Period Expiration: 3/18/2020  Plan of Care Expiration: 5/31/2019  Visit # / Visits authorized: 6/50  FOTO: 6/10  PTA visit: 2/6     Time In: 1100  Time Out: 1200  Total Billable Time: 25 minutes (1 MT, 1 TE)     Precautions: Standard, Fall      Subjective     Pt reports: still not sleeping well.  Has some pain this morning  She was compliant with home exercise program.  Response to previous treatment: no adverse reaction  Functional change: none    Pain: 3/10  Location: left knee      Objective     Martha received the following manual therapy techniques: Joint mobilizations, Myofacial release, Soft tissue Mobilization and PROM were applied to the: L knee for 10 minutes, including:    - retrograde edema reduction NOT PERFORMED TODAY  - soft tissue mobilization to medial and lateral aspect of knee and scar, quad, hamstring, and gastroc   - grade III/IV patellar mobilizations in all directions  - Grade II-III passive physiological knee flexion/extension      Martha received therapeutic exercises to develop strength, ROM and flexibility for 40 minutes including:    Stationary bike for 5 minutes working on increased knee flexion ROM     - quad sets                                          5"x20 L c/ towel roll under knee  - SLR                                                   3x10 B  - heel slides with strap                        " "10x10" L c/ straps  - sideying hip abduction                      1# 3x10 B  - long arc quads                                  3x10 c/ 3# ankle weight.     - fitter gastroc stretch                          3x30" double leg  - stair hamstring stretch                      3x30" L  - TKE                                                   5"x10 against therapist resistance for isolated quad activation  - marching:                                          2x10 B  - hip abduction:                                   2x10 B  - hip extension:                                   2x10 B    Cold pack to anterior/posterior left knee with elevation to promote extension on stool x 10 minutes to end session.      Home Exercises Provided and Patient Education Provided     Education provided:   - cont HEP regularly to maximize therapy benefits    Written Home Exercises Provided: Patient instructed to cont prior HEP.  Exercises were reviewed and Martha was able to demonstrate them prior to the end of the session.  Martha demonstrated good  understanding of the education provided.     See EMR under Patient Instructions for exercises provided 4/3/19.      Assessment     Pt tolerates therapy activities without difficulties or c/o increased pain.  Martha is progressing well towards her goals.   Pt prognosis is Excellent.     Pt will continue to benefit from skilled outpatient physical therapy to address the deficits listed in the problem list box on initial evaluation, provide pt/family education and to maximize pt's level of independence in the home and community environment.     Pt's spiritual, cultural and educational needs considered and pt agreeable to plan of care and goals.    Anticipated barriers to physical therapy: comorbidities    Goals:   Short Term Goals:  1. Pt will be independent with HEP supplement PT in improving functional mobility. PROGRESSING, NOT MET 4/12/2019   2. Pt will improve L LE strength to at least 75% " of R LE strength as measured via MicroFet handheld dynamometer in order to improve functional mobility PROGRESSING, NOT MET 4/12/2019   3. Pt will improve L knee AROM to at least 5-100 in order to improve gait PROGRESSING, NOT MET 4/12/2019   Long Term Goals:  1. Pt will improve L LE strength to at least 90% of R LE strength as measured via MicroFet handheld dynamometer in order to improve functional mobility PROGRESSING, NOT MET 4/12/2019   2. Pt will improve L knee AROM to at least 3-120 in order to improve gait and ability to perform ADLs PROGRESSING, NOT MET 4/12/2019   3. Pt will improve FOTO knee survey score to </= 44% limited in order to demo improved functional mobility PROGRESSING, NOT MET 4/12/2019   4. Pt will perform TUG in < 10 seconds without AD in order to demo improved gait speed PROGRESSING, NOT MET 4/12/2019   5. Pt will perform at least 10 sit to stands without UE support on 30 second sit to stand test in order to demo improved ability to perform transfers PROGRESSING, NOT MET 4/12/2019       Plan     Cont POC to progress towards established goals    Apoorva Negrete PTA

## 2019-04-17 ENCOUNTER — CLINICAL SUPPORT (OUTPATIENT)
Dept: REHABILITATION | Facility: HOSPITAL | Age: 64
End: 2019-04-17
Attending: ORTHOPAEDIC SURGERY
Payer: MEDICAID

## 2019-04-17 DIAGNOSIS — R29.898 DECREASED STRENGTH OF LOWER EXTREMITY: ICD-10-CM

## 2019-04-17 DIAGNOSIS — G89.29 CHRONIC PAIN OF LEFT KNEE: ICD-10-CM

## 2019-04-17 DIAGNOSIS — M25.662 DECREASED RANGE OF MOTION (ROM) OF LEFT KNEE: ICD-10-CM

## 2019-04-17 DIAGNOSIS — R26.89 IMPAIRED GAIT AND MOBILITY: ICD-10-CM

## 2019-04-17 DIAGNOSIS — M25.562 CHRONIC PAIN OF LEFT KNEE: ICD-10-CM

## 2019-04-17 PROCEDURE — 97110 THERAPEUTIC EXERCISES: CPT | Mod: PN

## 2019-04-17 NOTE — PROGRESS NOTES
"  Physical Therapy Daily Treatment Note     Name: Martha Sevilla  Clinic Number: [id number]    Therapy Diagnosis:   Encounter Diagnoses   Name Primary?    Chronic pain of left knee     Decreased range of motion (ROM) of left knee     Decreased strength of lower extremity     Impaired gait and mobility      Physician: Niraj Pérez MD    Visit Date: 4/17/2019    Physician Orders: PT Eval and Treat   Medical Diagnosis from Referral:   M17.12 (ICD-10-CM) - Arthritis of knee, left   Z96.652 (ICD-10-CM) - History of left knee replacement   Evaluation Date: 4/3/2019  Authorization Period Expiration: 3/18/2020  Plan of Care Expiration: 5/31/2019  Visit # / Visits authorized: 7/50  FOTO: 7/10  PTA visit: 3/6     Time In: 1000  Time Out: 1105  Total Billable Time: 30 minutes (2 TE)     Precautions: Standard, Fall      Subjective     Pt reports: no new c/o.  Knee is still waking her up at night, but not as much.  She was compliant with home exercise program.  Response to previous treatment: no adverse reaction  Functional change: none    Pain: 4/10  Location: left knee      Objective     Martha received the following manual therapy techniques: Joint mobilizations, Myofacial release, Soft tissue Mobilization and PROM were applied to the: L knee for 10 minutes, including:    - retrograde edema reduction NOT PERFORMED TODAY  - soft tissue mobilization to medial and lateral aspect of knee and scar, quad, hamstring, and gastroc   - grade III/IV patellar mobilizations in all directions  - Grade II-III passive physiological knee flexion/extension       Martha received therapeutic exercises to develop strength, ROM and flexibility for 45 minutes including:    Stationary bike for 5 minutes working on increased knee flexion ROM     - quad sets                                          5"x20 L c/ towel roll under knee  - SLR                                                   3x10 B  - heel slides with " "strap                        10x10" L c/ straps  - sideying hip abduction                      1# 3x10 B  - long arc quads                                  3x10 c/ 3# ankle weight.     - fitter gastroc stretch                          3x30" double leg  - stair hamstring stretch                      3x30" L  - TKE                                                   5"x10   - marching:                                          2x10 B  - hip abduction:                                   2x10 B  - hip extension:                                   2x10 B     Cold pack to anterior/posterior left knee with elevation to promote extension on stool x 10 minutes to end session.       Home Exercises Provided and Patient Education Provided     Education provided:   - cont HEP regularly to maximize therapy benefits    Written Home Exercises Provided: Patient instructed to cont prior HEP.  Exercises were reviewed and Martha was able to demonstrate them prior to the end of the session.  Martha demonstrated good  understanding of the education provided.     See EMR under Patient Instructions for exercises provided 4/3/319.      Assessment     Pt tolerates therapy activities without difficulties or c/o increased pain.  Martha is progressing well towards her goals.   Pt prognosis is Excellent.     Pt will continue to benefit from skilled outpatient physical therapy to address the deficits listed in the problem list box on initial evaluation, provide pt/family education and to maximize pt's level of independence in the home and community environment.     Pt's spiritual, cultural and educational needs considered and pt agreeable to plan of care and goals.    Anticipated barriers to physical therapy: comorbidities    Goals:   Short Term Goals:  1. Pt will be independent with HEP supplement PT in improving functional mobility. PROGRESSING, NOT MET 4/12/2019   2. Pt will improve L LE strength to at least 75% of R LE strength as " measured via MicroFet handheld dynamometer in order to improve functional mobility PROGRESSING, NOT MET 4/12/2019   3. Pt will improve L knee AROM to at least 5-100 in order to improve gait PROGRESSING, NOT MET 4/12/2019   Long Term Goals:  1. Pt will improve L LE strength to at least 90% of R LE strength as measured via MicroFet handheld dynamometer in order to improve functional mobility PROGRESSING, NOT MET 4/12/2019   2. Pt will improve L knee AROM to at least 3-120 in order to improve gait and ability to perform ADLs PROGRESSING, NOT MET 4/12/2019   3. Pt will improve FOTO knee survey score to </= 44% limited in order to demo improved functional mobility PROGRESSING, NOT MET 4/12/2019   4. Pt will perform TUG in < 10 seconds without AD in order to demo improved gait speed PROGRESSING, NOT MET 4/12/2019   5. Pt will perform at least 10 sit to stands without UE support on 30 second sit to stand test in order to demo improved ability to perform transfers PROGRESSING, NOT MET 4/12/2019       Plan     Cont POC to progress towards established goals    Apoorva Negrete PTA

## 2019-04-22 ENCOUNTER — CLINICAL SUPPORT (OUTPATIENT)
Dept: REHABILITATION | Facility: HOSPITAL | Age: 64
End: 2019-04-22
Attending: ORTHOPAEDIC SURGERY
Payer: MEDICAID

## 2019-04-22 DIAGNOSIS — R29.898 DECREASED STRENGTH OF LOWER EXTREMITY: ICD-10-CM

## 2019-04-22 DIAGNOSIS — G89.29 CHRONIC PAIN OF LEFT KNEE: ICD-10-CM

## 2019-04-22 DIAGNOSIS — M25.662 DECREASED RANGE OF MOTION (ROM) OF LEFT KNEE: ICD-10-CM

## 2019-04-22 DIAGNOSIS — R26.89 IMPAIRED GAIT AND MOBILITY: ICD-10-CM

## 2019-04-22 DIAGNOSIS — M25.562 CHRONIC PAIN OF LEFT KNEE: ICD-10-CM

## 2019-04-22 PROCEDURE — 97110 THERAPEUTIC EXERCISES: CPT | Mod: PN

## 2019-04-22 PROCEDURE — 97140 MANUAL THERAPY 1/> REGIONS: CPT | Mod: PN

## 2019-04-22 PROCEDURE — 97116 GAIT TRAINING THERAPY: CPT | Mod: PN

## 2019-04-22 NOTE — PROGRESS NOTES
"  Physical Therapy Daily Treatment Note     Name: Martha Sevilla  Clinic Number: [id number]    Therapy Diagnosis:   Encounter Diagnoses   Name Primary?    Chronic pain of left knee     Decreased range of motion (ROM) of left knee     Decreased strength of lower extremity     Impaired gait and mobility      Physician: Niraj Pérez MD    Visit Date: 4/22/2019    Physician Orders: PT Eval and Treat   Medical Diagnosis from Referral:   M17.12 (ICD-10-CM) - Arthritis of knee, left   Z96.652 (ICD-10-CM) - History of left knee replacement   Evaluation Date: 4/3/2019  Authorization Period Expiration: 3/18/2020  Plan of Care Expiration: 5/31/2019  Visit # / Visits authorized: 8/50  FOTO: 8/10     Time In: 1104  Time Out: 1200  Total Billable Time: 56 minutes      Precautions: Standard, Fall    Subjective     Pt reports: stiffness in L knee this morning.  She was compliant with home exercise program.  Response to previous treatment: slowly decreasing pain at night  Functional change: improving walking    Pain: 0/10  Location: L knee      Objective     Martha received the gait training for 10 minutes including:  Level walking with R UE support on // bar for 3 laps (8 feet length, 48 feet total); supervision  Level walking with quad cane using 3 point pattern for 3 laps in // bars (48 feet total); supervision  Level walking with quad cane using 3 point pattern for 70 feet; contact guard assistance to supervision  Level walking with quad cane using 2 point pattern for 70 feet; supervision     Martha received therapeutic exercises to develop strength, ROM and flexibility for 20 minutes 1:1 and 26 minutes supervissed including:  Knee AROM: 5-112 degrees L  Knee PROM: 1-115 degrees L    Stationary bike for 8 minutes with initial backwards revolutions progressing forwards for increased knee flexion ROM     - quad sets: 5" 2x10 L c/ towel roll under knee; verbal and tactile cues for isolated quad activation  - SLR: " "2x15 L  - heel slides with strap: out of time, resume next  - long arc quads: 3# 3x10 L     - fitter gastroc stretch: 3x30" double leg  - stair hamstring stretch: 3x30" L  - TKE: out of time; next with orange theracord  - step ups: 4" step 2x10 L  - mini squats: 2x10    Home Exercises Provided and Patient Education Provided     Education provided:   - Continue HEP    Written Home Exercises Provided: No.  Exercises were reviewed and Martha was able to demonstrate them prior to the end of the session.  Martha demonstrated good  understanding of the education provided.     See EMR under Patient Instructions for exercises provided 4/3/319.    Assessment     Initial fear avoidance with transition of gait to quad cane, however quality and confidence improved with increasing distance. Fatigue with gait and standing exercises.    Martha is progressing well towards her goals.   Pt prognosis is Excellent.   Pt will continue to benefit from skilled outpatient physical therapy to address the deficits listed in the problem list box on initial evaluation, provide pt/family education and to maximize pt's level of independence in the home and community environment.     Pt's spiritual, cultural and educational needs considered and pt agreeable to plan of care and goals.  Anticipated barriers to physical therapy: comorbidities    Goals:   Short Term Goals:  1. Pt will be independent with HEP supplement PT in improving functional mobility. PROGRESSING, NOT 04/22/2019   2. Pt will improve L LE strength to at least 75% of R LE strength as measured via MicroFet handheld dynamometer in order to improve functional mobility PROGRESSING, NOT 04/22/2019   3. Pt will improve L knee AROM to at least 5-100 in order to improve gait PROGRESSING, NOT 04/22/2019   Long Term Goals:  1. Pt will improve L LE strength to at least 90% of R LE strength as measured via MicroFet handheld dynamometer in order to improve functional mobility " PROGRESSING, NOT 04/22/2019   2. Pt will improve L knee AROM to at least 3-120 in order to improve gait and ability to perform ADLs PROGRESSING, NOT 04/22/2019   3. Pt will improve FOTO knee survey score to </= 44% limited in order to demo improved functional mobility PROGRESSING, NOT 04/22/2019   4. Pt will perform TUG in < 10 seconds without AD in order to demo improved gait speed PROGRESSING, NOT 04/22/2019   5. Pt will perform at least 10 sit to stands without UE support on 30 second sit to stand test in order to demo improved ability to perform transfers PROGRESSING, NOT 04/22/2019     Plan     Continue plan of care. Gait train with SPC next    Prerna Sharma, PT

## 2019-04-23 ENCOUNTER — TELEPHONE (OUTPATIENT)
Dept: ORTHOPEDICS | Facility: CLINIC | Age: 64
End: 2019-04-23

## 2019-04-23 DIAGNOSIS — M25.562 LEFT KNEE PAIN, UNSPECIFIED CHRONICITY: ICD-10-CM

## 2019-04-23 RX ORDER — OXYCODONE AND ACETAMINOPHEN 5; 325 MG/1; MG/1
1 TABLET ORAL EVERY 8 HOURS PRN
Qty: 40 TABLET | Refills: 0 | Status: SHIPPED | OUTPATIENT
Start: 2019-04-23 | End: 2019-11-19

## 2019-04-23 RX ORDER — AMITRIPTYLINE HYDROCHLORIDE 50 MG/1
50 TABLET, FILM COATED ORAL NIGHTLY PRN
Qty: 30 TABLET | Refills: 2 | Status: SHIPPED | OUTPATIENT
Start: 2019-04-23 | End: 2019-11-19

## 2019-04-23 NOTE — TELEPHONE ENCOUNTER
----- Message from Dunia Drake LPN sent at 4/22/2019  3:32 PM CDT -----  Contact: self/[phone]      ----- Message -----  From: Stephanie Mcgill  Sent: 4/22/2019   2:44 PM  To: [facility] Staff    Patient is requesting a refill on her medications.  Please call and advise when sent to pharmacy.    amitriptyline (ELAVIL) 50 MG tablet  oxyCODONE-acetaminophen (PERCOCET) 5-325 mg per tablet      [facility] [id number] [place] [address] [address] AT [facility]

## 2019-04-24 ENCOUNTER — CLINICAL SUPPORT (OUTPATIENT)
Dept: REHABILITATION | Facility: HOSPITAL | Age: 64
End: 2019-04-24
Attending: ORTHOPAEDIC SURGERY
Payer: MEDICAID

## 2019-04-24 DIAGNOSIS — M25.662 DECREASED RANGE OF MOTION (ROM) OF LEFT KNEE: ICD-10-CM

## 2019-04-24 DIAGNOSIS — R26.89 IMPAIRED GAIT AND MOBILITY: ICD-10-CM

## 2019-04-24 DIAGNOSIS — M25.562 CHRONIC PAIN OF LEFT KNEE: ICD-10-CM

## 2019-04-24 DIAGNOSIS — R29.898 DECREASED STRENGTH OF LOWER EXTREMITY: ICD-10-CM

## 2019-04-24 DIAGNOSIS — G89.29 CHRONIC PAIN OF LEFT KNEE: ICD-10-CM

## 2019-04-24 PROCEDURE — 97110 THERAPEUTIC EXERCISES: CPT | Mod: PN

## 2019-04-24 NOTE — PROGRESS NOTES
"  Physical Therapy Daily Treatment Note     Name: Martha Sevilla  Clinic Number: [id number]    Therapy Diagnosis:   Encounter Diagnoses   Name Primary?    Chronic pain of left knee     Decreased range of motion (ROM) of left knee     Decreased strength of lower extremity     Impaired gait and mobility      Physician: Niraj Pérez MD    Visit Date: 4/24/2019    Physician Orders: PT Eval and Treat   Medical Diagnosis from Referral:   M17.12 (ICD-10-CM) - Arthritis of knee, left   Z96.652 (ICD-10-CM) - History of left knee replacement   Evaluation Date: 4/3/2019  Authorization Period Expiration: 3/18/2020  Plan of Care Expiration: 5/31/2019  Visit # / Visits authorized: 9/50  FOTO: 9/10  PTA visit: 1/6     Time In: 1100  Time Out: 1200  Total Billable Time: 25 minutes (2 TE)     Precautions: Standard, Fall      Subjective     Pt reports: she slept better last night.  Knee is a little stiff this morning  She was compliant with home exercise program.  Response to previous treatment: no adverse reaction  Functional change: none    Pain: 3/10  Location: left knee      Objective       Martha received therapeutic exercises to develop strength, ROM and flexibility for 40 minutes including:     Stationary bike for 5 minutes with initial backwards revolutions progressing forwards for increased knee flexion ROM     - quad sets: 5" 2x10 L c/ towel roll under knee; verbal and tactile cues for isolated quad activation  - SLR: 2x15 L  - heel slides with strap: out of time, resume next  - long arc quads: 3# 3x10 L     - fitter gastroc stretch: 3x30" double leg  - stair hamstring stretch: 3x30" L  - TKE: out of time; next with orange theracord  - step ups: 4" step 2x10 L  - mini squats: 2x10    Martha received the gait training for 10 minutes with SPC supervision.  Verbal cues needed for sequencing, equal step length and upright posture.  Slow pace initially that increased as patient became more comfortable ambulating " with SPC    Ice x 10 min to L knee for pain control      Home Exercises Provided and Patient Education Provided     Education provided:   - cont HEP regularly    Written Home Exercises Provided: Patient instructed to cont prior HEP.  Exercises were reviewed and Martha was able to demonstrate them prior to the end of the session.  Martha demonstrated good  understanding of the education provided.     See EMR under Patient Instructions for exercises provided 4/3/19.      Assessment     Pt tolerates therapy interventions without difficulties or c/o increased pain.  Needs encouragement with gait training 2/2 fear of falling.  Martha is progressing well towards her goals.   Pt prognosis is Excellent.     Pt will continue to benefit from skilled outpatient physical therapy to address the deficits listed in the problem list box on initial evaluation, provide pt/family education and to maximize pt's level of independence in the home and community environment.     Pt's spiritual, cultural and educational needs considered and pt agreeable to plan of care and goals.    Anticipated barriers to physical therapy: comorbidities    Goals:   Short Term Goals:  1. Pt will be independent with HEP supplement PT in improving functional mobility. PROGRESSING, NOT 04/22/2019   2. Pt will improve L LE strength to at least 75% of R LE strength as measured via MicroFet handheld dynamometer in order to improve functional mobility PROGRESSING, NOT 04/22/2019   3. Pt will improve L knee AROM to at least 5-100 in order to improve gait PROGRESSING, NOT 04/22/2019   Long Term Goals:  1. Pt will improve L LE strength to at least 90% of R LE strength as measured via MicroFet handheld dynamometer in order to improve functional mobility PROGRESSING, NOT 04/22/2019   2. Pt will improve L knee AROM to at least 3-120 in order to improve gait and ability to perform ADLs PROGRESSING, NOT 04/22/2019   3. Pt will improve FOTO knee survey score to  </= 44% limited in order to demo improved functional mobility PROGRESSING, NOT 04/22/2019   4. Pt will perform TUG in < 10 seconds without AD in order to demo improved gait speed PROGRESSING, NOT 04/22/2019   5. Pt will perform at least 10 sit to stands without UE support on 30 second sit to stand test in order to demo improved ability to perform transfers PROGRESSING, NOT 04/22/2019       Plan     Cont POC to progress towards established goals    Apoorva Negrete, PTA

## 2019-04-26 ENCOUNTER — CLINICAL SUPPORT (OUTPATIENT)
Dept: REHABILITATION | Facility: HOSPITAL | Age: 64
End: 2019-04-26
Attending: ORTHOPAEDIC SURGERY
Payer: MEDICAID

## 2019-04-26 DIAGNOSIS — M25.562 CHRONIC PAIN OF LEFT KNEE: ICD-10-CM

## 2019-04-26 DIAGNOSIS — R26.89 IMPAIRED GAIT AND MOBILITY: ICD-10-CM

## 2019-04-26 DIAGNOSIS — G89.29 CHRONIC PAIN OF LEFT KNEE: ICD-10-CM

## 2019-04-26 DIAGNOSIS — M25.662 DECREASED RANGE OF MOTION (ROM) OF LEFT KNEE: ICD-10-CM

## 2019-04-26 DIAGNOSIS — R29.898 DECREASED STRENGTH OF LOWER EXTREMITY: ICD-10-CM

## 2019-04-26 PROCEDURE — 97110 THERAPEUTIC EXERCISES: CPT | Mod: PN

## 2019-04-29 ENCOUNTER — CLINICAL SUPPORT (OUTPATIENT)
Dept: REHABILITATION | Facility: HOSPITAL | Age: 64
End: 2019-04-29
Attending: ORTHOPAEDIC SURGERY
Payer: MEDICAID

## 2019-04-29 DIAGNOSIS — R26.89 IMPAIRED GAIT AND MOBILITY: ICD-10-CM

## 2019-04-29 DIAGNOSIS — M25.562 CHRONIC PAIN OF LEFT KNEE: ICD-10-CM

## 2019-04-29 DIAGNOSIS — G89.29 CHRONIC PAIN OF LEFT KNEE: ICD-10-CM

## 2019-04-29 DIAGNOSIS — M25.662 DECREASED RANGE OF MOTION (ROM) OF LEFT KNEE: ICD-10-CM

## 2019-04-29 DIAGNOSIS — R29.898 DECREASED STRENGTH OF LOWER EXTREMITY: ICD-10-CM

## 2019-04-29 PROCEDURE — 97110 THERAPEUTIC EXERCISES: CPT | Mod: PN

## 2019-04-29 PROCEDURE — 97116 GAIT TRAINING THERAPY: CPT | Mod: PN

## 2019-04-29 NOTE — PROGRESS NOTES
"  Physical Therapy Daily Treatment Note     Name: Martha Sevilla  Clinic Number: [id number]    Therapy Diagnosis:   Encounter Diagnoses   Name Primary?    Chronic pain of left knee     Decreased range of motion (ROM) of left knee     Decreased strength of lower extremity     Impaired gait and mobility      Physician: Niraj Pérez MD    Visit Date: 4/29/2019    Physician Orders: PT Eval and Treat   Medical Diagnosis from Referral:   M17.12 (ICD-10-CM) - Arthritis of knee, left   Z96.652 (ICD-10-CM) - History of left knee replacement   Evaluation Date: 4/3/2019  Authorization Period Expiration: 3/18/2020  Plan of Care Expiration: 5/31/2019  Visit # / Visits authorized: 11/50  FOTO: 11/10 done      Time In: 1101  Time Out: 1046  Total Billable Time: 40 minutes    Precautions: Standard, fall    Subjective     Pt reports: continued clicking in knee. Has follow-up appointment with Dr. Pérez on 5/7.  She was compliant with home exercise program.  Response to previous treatment: improving strength  Functional change: improved confidence with walking using single point cane    Pain: 3/10  Location: L knee      Objective     Martha received therapeutic exercises to develop strength, ROM and flexibility for 25 minutes including: stationary bike with full revolutions for 5 minutes for increased knee flexion range and below    Fitter gastroc stretch: 3x30" double leg  TKE: orange cook band x20 L  Step ups: 6" step 2x15 L  Mini squats: 3x10  Steamboats RTB 2x10     CMS Impairment/Limitation/Restriction for FOTO Knee Survey    Therapist reviewed FOTO scores for Martha Sevilla on 4/29/2019.   FOTO documents entered into [employer] - see Media section.    Limitation Score: 55%  Category: Mobility     Martha received gait training for 15 minutes including:     Unlevel walking for 200 feet using SPC with 2 point pattern including: grassy plains, slanted concrete, up and down 6" curb; stand by assistance to " "supervision  Level walking for 100 feet without AD; contact guard to stand by assistance  Stair navigation on 12 6" steps with reciprocal pattern; supervision     Home Exercises Provided and Patient Education Provided     Education provided:   - Continue HEP   - Begin household ambulation without AD    Written Home Exercises Provided: No.  Exercises were reviewed and Martha was able to demonstrate them prior to the end of the session.  Martha demonstrated good  understanding of the education provided.     See EMR under Patient Instructions for exercises provided 4/3/19.    Assessment     Patient with some fear avoidance with outdoor ambulation using cane and indoor without AD; however fair-good performance. Fatigued by progression of standing exercises. Overall strength and functional mobility in standing improving well.     Martha is progressing well towards her goals.   Pt prognosis is Good.   Pt will continue to benefit from skilled outpatient physical therapy to address the deficits listed in the problem list box on initial evaluation, provide pt/family education and to maximize pt's level of independence in the home and community environment.     Pt's spiritual, cultural and educational needs considered and pt agreeable to plan of care and goals.  Anticipated barriers to physical therapy: comorbidities     Goals:   Short Term Goals:  1. Pt will be independent with HEP supplement PT in improving functional mobility MET    2. Pt will improve L LE strength to at least 75% of R LE strength as measured via MicroFet handheld dynamometer in order to improve functional mobility MET  3. Pt will improve L knee AROM to at least 5-100 in order to improve gait MET  Long Term Goals:  1. Pt will improve L LE strength to at least 90% of R LE strength as measured via MicroFet handheld dynamometer in order to improve functional mobility PROGRESSING, NOT MET 4/29/2019   2. Pt will improve L knee AROM to at least 3-120 in " order to improve gait and ability to perform ADLs PROGRESSING, NOT MET 4/29/2019   3. Pt will improve FOTO knee survey score to </= 44% limited in order to demo improved functional mobility PROGRESSING, NOT MET 4/29/2019   4. Pt will perform TUG in < 10 seconds without AD in order to demo improved gait speed PROGRESSING, NOT MET 4/29/2019   5. Pt will perform at least 10 sit to stands without UE support on 30 second sit to stand test in order to demo improved ability to perform transfers PROGRESSING, NOT MET 4/29/2019     Plan     Continue plan of care. Gait train outdoors with SPC and indoors without AD. Add step downs.     Prerna Sharma, PT

## 2019-05-01 ENCOUNTER — CLINICAL SUPPORT (OUTPATIENT)
Dept: REHABILITATION | Facility: HOSPITAL | Age: 64
End: 2019-05-01
Attending: ORTHOPAEDIC SURGERY
Payer: MEDICAID

## 2019-05-01 DIAGNOSIS — R29.898 DECREASED STRENGTH OF LOWER EXTREMITY: ICD-10-CM

## 2019-05-01 DIAGNOSIS — M25.562 CHRONIC PAIN OF LEFT KNEE: ICD-10-CM

## 2019-05-01 DIAGNOSIS — R26.89 IMPAIRED GAIT AND MOBILITY: ICD-10-CM

## 2019-05-01 DIAGNOSIS — G89.29 CHRONIC PAIN OF LEFT KNEE: ICD-10-CM

## 2019-05-01 DIAGNOSIS — M25.662 DECREASED RANGE OF MOTION (ROM) OF LEFT KNEE: ICD-10-CM

## 2019-05-01 PROCEDURE — 97110 THERAPEUTIC EXERCISES: CPT | Mod: PN

## 2019-05-01 PROCEDURE — 97140 MANUAL THERAPY 1/> REGIONS: CPT | Mod: PN

## 2019-05-01 NOTE — PROGRESS NOTES
"  Physical Therapy Daily Treatment Note     Name: Martha Sevilla  Clinic Number: [id number]    Therapy Diagnosis:   Encounter Diagnoses   Name Primary?    Chronic pain of left knee     Decreased range of motion (ROM) of left knee     Decreased strength of lower extremity     Impaired gait and mobility      Physician: Niraj Pérez MD    Visit Date: 5/1/2019    Physician Orders: PT Eval and Treat   Medical Diagnosis from Referral:   M17.12 (ICD-10-CM) - Arthritis of knee, left   Z96.652 (ICD-10-CM) - History of left knee replacement   Evaluation Date: 4/3/2019  Authorization Period Expiration: 3/18/2020  Plan of Care Expiration: 5/31/2019  Visit # / Visits authorized: 12/50  FOTO: 1/10    Time In: 1105  Time Out: 1210  Total Billable Time: 31 minutes    Precautions: Standard, fall    Subjective     Pt reports: increased pain yesterday, somewhat improved today; feels like it may be related to stair climbing  She was compliant with home exercise program.  Response to previous treatment: increased pain.  Functional change: decreased functional mobility    Pain: 5/10  Location: L knee      Objective     Martha received moist hot pack to B knee for 10 minutes to decrease pain and promote tissue pliability prior to therapeutic exercise and manual therapy.    Martha received therapeutic exercises to develop strength, ROM and flexibility for 14 minutes supervised and 15 minutes 1:1 including: stationary bike with full revolutions for 8 minutes for increased knee flexion range and decreased sympathetic response and below    Fitter gastroc stretch: 3x30" double leg  Stair hamstring stretch: 3x30" B  Knee ROM @ stairs: 10"x10 L knee  Step ups: 6" step x10 L  Mini squats: 2x10  Steamboats RTB 2x10     Martha received the following manual therapy techniques: were applied to the: L knee for 16 minutes, including:  Retrograde edema reduction  Soft tissue mobilization to medial and lateral aspect of knee, quad, IT " band, hamstring with emphasis to distal biceps femoris, and gastrocnemius    Martha received cold pack to B knee for 10 minutes to decrease pain and inflammation.    Home Exercises Provided and Patient Education Provided     Education provided:   - Continue HEP   - Ice to address increased pain to knee  - Continue household ambulation without AD    Written Home Exercises Provided: No.  Exercises were reviewed and Martha was able to demonstrate them prior to the end of the session.  Martha demonstrated good  understanding of the education provided.     See EMR under Patient Instructions for exercises provided 4/3/19.    Assessment     Session limited to increased pain. Increased tissue restrictions to distal biceps femoris.    Martha is progressing well towards her goals.   Pt prognosis is Good.   Pt will continue to benefit from skilled outpatient physical therapy to address the deficits listed in the problem list box on initial evaluation, provide pt/family education and to maximize pt's level of independence in the home and community environment.     Pt's spiritual, cultural and educational needs considered and pt agreeable to plan of care and goals.  Anticipated barriers to physical therapy: comorbidities     Goals:   Short Term Goals:  1. Pt will be independent with HEP supplement PT in improving functional mobility MET    2. Pt will improve L LE strength to at least 75% of R LE strength as measured via MicroFet handheld dynamometer in order to improve functional mobility MET  3. Pt will improve L knee AROM to at least 5-100 in order to improve gait MET  Long Term Goals:  1. Pt will improve L LE strength to at least 90% of R LE strength as measured via MicroFet handheld dynamometer in order to improve functional mobility PROGRESSING, NOT MET 5/1/2019   2. Pt will improve L knee AROM to at least 3-120 in order to improve gait and ability to perform ADLs PROGRESSING, NOT MET 5/1/2019   3. Pt will  improve FOTO knee survey score to </= 44% limited in order to demo improved functional mobility PROGRESSING, NOT MET 5/1/2019   4. Pt will perform TUG in < 10 seconds without AD in order to demo improved gait speed PROGRESSING, NOT MET 5/1/2019   5. Pt will perform at least 10 sit to stands without UE support on 30 second sit to stand test in order to demo improved ability to perform transfers PROGRESSING, NOT MET 5/1/2019     Plan     Continue plan of care. Monitor pain. Resume exercises as tolerated.     Prerna Sharma, PT

## 2019-05-06 ENCOUNTER — CLINICAL SUPPORT (OUTPATIENT)
Dept: REHABILITATION | Facility: HOSPITAL | Age: 64
End: 2019-05-06
Attending: ORTHOPAEDIC SURGERY
Payer: MEDICAID

## 2019-05-06 DIAGNOSIS — R26.89 IMPAIRED GAIT AND MOBILITY: ICD-10-CM

## 2019-05-06 DIAGNOSIS — M25.562 CHRONIC PAIN OF LEFT KNEE: ICD-10-CM

## 2019-05-06 DIAGNOSIS — R29.898 DECREASED STRENGTH OF LOWER EXTREMITY: ICD-10-CM

## 2019-05-06 DIAGNOSIS — M25.662 DECREASED RANGE OF MOTION (ROM) OF LEFT KNEE: ICD-10-CM

## 2019-05-06 DIAGNOSIS — G89.29 CHRONIC PAIN OF LEFT KNEE: ICD-10-CM

## 2019-05-06 PROCEDURE — 97110 THERAPEUTIC EXERCISES: CPT | Mod: PN

## 2019-05-06 NOTE — PROGRESS NOTES
"  Physical Therapy Daily Treatment Note     Name: Martha Sevilla  Clinic Number: [id number]    Therapy Diagnosis:   Encounter Diagnoses   Name Primary?    Chronic pain of left knee     Decreased range of motion (ROM) of left knee     Decreased strength of lower extremity     Impaired gait and mobility      Physician: Niraj Pérez MD    Visit Date: 5/6/2019    Physician Orders: PT Eval and Treat   Medical Diagnosis from Referral:   M17.12 (ICD-10-CM) - Arthritis of knee, left   Z96.652 (ICD-10-CM) - History of left knee replacement   Evaluation Date: 4/3/2019  Authorization Period Expiration: 3/18/2020  Plan of Care Expiration: 5/31/2019  Visit # / Visits authorized: 12/50  FOTO: 1/10    Time In: 1100  Time Out: 1150  Total Billable Time: 30 minutes    Precautions: Standard, fall    Subjective     Pt reports: increased pain at night. Aching pain at [employer] limiting walking distance.   She was compliant with home exercise program.  Response to previous treatment: decreased pain compared to last session.  Functional change: Improved tolerance to standing and walking    Pain: 4/10  Location: L knee      Objective     Martha received therapeutic exercises to develop strength, ROM and flexibility for 22 minutes 1:1 and 15 minutes supervised including:     AROM: 5-120  PROM: 4-120    L/E Strength w/ MicroFET Muscle Verna Dynamometer Right Left Pain/Dysfunction with Movement   (approx 4 sec hold w/ max contraction)   Hip Flexion 19.5 kg  13.8 kg     Hip Abduction 19.5 kg  19.6 kg     Quadriceps 18.1 kg  11.8 kg     Hamstrings 16.4 kg  15.6 kg       Stationary bike: 5' full forwards revolutions at beginning and end of session  Fitter gastroc stretch: 3x30" double leg  Stair hamstring stretch: 3x30" B  Knee ROM @ stairs: 5"x20 L knee  Forward step ups: 6" step 2x15 L  Forward step downs: x11 L  Squats: to ~60 degrees 2x10; B UE support    Martha received gait training for 8 minutes without AD including: " "  Level walking for 200 feet with continuous pattern, rest break at 140 feet; supervision  Navigation up and down 6" curbs x 6 reps; stand by assistance and cues to soften landing on descent    Martha received cold pack to B knee for 5 minutes to decrease pain and inflammation.     Home Exercises Provided and Patient Education Provided     Education provided:   - Continue HEP   - Continue household ambulation without AD    Written Home Exercises Provided: No.  Exercises were reviewed and Martha was able to demonstrate them prior to the end of the session.  Martha demonstrated good  understanding of the education provided.     See EMR under Patient Instructions for exercises provided 4/3/19.    Assessment     Improved tolerance to session today. Range and strength progressing towards long term goals. Patient walks fairly well without AD; requires rest break due to fatigue. Fear avoidance contributing to speed and landing of step downs; improves with increasing reps.    Martha is progressing well towards her goals.   Pt prognosis is Good.   Pt will continue to benefit from skilled outpatient physical therapy to address the deficits listed in the problem list box on initial evaluation, provide pt/family education and to maximize pt's level of independence in the home and community environment.     Pt's spiritual, cultural and educational needs considered and pt agreeable to plan of care and goals.  Anticipated barriers to physical therapy: comorbidities     Goals:   Short Term Goals:  1. Pt will be independent with HEP supplement PT in improving functional mobility MET    2. Pt will improve L LE strength to at least 75% of R LE strength as measured via MicroFet handheld dynamometer in order to improve functional mobility MET  3. Pt will improve L knee AROM to at least 5-100 in order to improve gait MET  Long Term Goals:  1. Pt will improve L LE strength to at least 90% of R LE strength as measured via " MicroFet handheld dynamometer in order to improve functional mobility PARTIALLY MET 5/6/2019   2. Pt will improve L knee AROM to at least 3-120 in order to improve gait and ability to perform ADLs PARTIALLY MET 5/6/2019   3. Pt will improve FOTO knee survey score to </= 44% limited in order to demo improved functional mobility PROGRESSING, NOT MET 5/6/2019   4. Pt will perform TUG in < 10 seconds without AD in order to demo improved gait speed PROGRESSING, NOT MET 5/6/2019   5. Pt will perform at least 10 sit to stands without UE support on 30 second sit to stand test in order to demo improved ability to perform transfers PROGRESSING, NOT MET 5/6/2019     Plan     Continue plan of care. Gait train in- and out-doors without AD.     Prerna Sharma, PT

## 2019-05-07 ENCOUNTER — OFFICE VISIT (OUTPATIENT)
Dept: ORTHOPEDICS | Facility: CLINIC | Age: 64
End: 2019-05-07
Payer: MEDICAID

## 2019-05-07 VITALS — WEIGHT: 270 LBS | BODY MASS INDEX: 42.38 KG/M2 | HEIGHT: 67 IN

## 2019-05-07 DIAGNOSIS — M17.12 ARTHRITIS OF KNEE, LEFT: ICD-10-CM

## 2019-05-07 DIAGNOSIS — Z98.890 S/P LEFT KNEE SURGERY: Primary | ICD-10-CM

## 2019-05-07 PROCEDURE — 99212 OFFICE O/P EST SF 10 MIN: CPT | Mod: PBBFAC,PN | Performed by: ORTHOPAEDIC SURGERY

## 2019-05-07 PROCEDURE — 99999 PR PBB SHADOW E&M-EST. PATIENT-LVL II: ICD-10-PCS | Mod: PBBFAC,,, | Performed by: ORTHOPAEDIC SURGERY

## 2019-05-07 PROCEDURE — 99024 POSTOP FOLLOW-UP VISIT: CPT | Mod: ,,, | Performed by: ORTHOPAEDIC SURGERY

## 2019-05-07 PROCEDURE — 99024 PR POST-OP FOLLOW-UP VISIT: ICD-10-PCS | Mod: ,,, | Performed by: ORTHOPAEDIC SURGERY

## 2019-05-07 PROCEDURE — 99999 PR PBB SHADOW E&M-EST. PATIENT-LVL II: CPT | Mod: PBBFAC,,, | Performed by: ORTHOPAEDIC SURGERY

## 2019-05-07 NOTE — PROGRESS NOTES
"Subjective:      Patient ID: Martha Sevilla is a 63 y.o. female.    Chief Complaint: Pain and Post-op Evaluation of the Left Knee      HPI:  Six weeks postop  The patient is seen for postop follow-up of left  TKA.  Pain control has been satisfactory  They feel that they are ambulating easily  Preoperative complaints include:  Cutaneous discomfort distally      Current Outpatient Medications:     amitriptyline (ELAVIL) 50 MG tablet, Take 1 tablet (50 mg total) by mouth nightly as needed for Insomnia or Pain., Disp: 30 tablet, Rfl: 2    ibuprofen (ADVIL,MOTRIN) 600 MG tablet, TK 1 T PO Q 6 H PRN. TK WF, Disp: , Rfl: 0    oxyCODONE-acetaminophen (PERCOCET) 5-325 mg per tablet, Fill this prescription 1st. Take 1 tablet by mouth every 4 hr as needed for pain, Disp: 28 tablet, Rfl: 0    oxyCODONE-acetaminophen (PERCOCET) 5-325 mg per tablet, Take 1 tablet by mouth every 8 (eight) hours as needed for Pain., Disp: 40 tablet, Rfl: 0    promethazine (PHENERGAN) 25 MG tablet, Take 1 tablet (25 mg total) by mouth nightly as needed for Nausea., Disp: 30 tablet, Rfl: 0    triamcinolone acetonide 0.025% (KENALOG) 0.025 % cream, Apply topically 2 (two) times daily. Do not apply near incision, Disp: 30 g, Rfl: 0    aspirin (ECOTRIN) 81 MG EC tablet, Take 1 tablet (81 mg total) by mouth once daily., Disp: 100 tablet, Rfl: 0  Review of patient's allergies indicates:  No Known Allergies    Ht 5' 7" (1.702 m)   Wt 122.5 kg (270 lb)   LMP  (LMP Unknown)   BMI 42.29 kg/m²     ROS        Objective:    Ortho Exam          Alert, oriented, no acute distress  Sclera-Normal  Respiratory distress-none  Gait mild limp  Incision:  Normally healed  Range of motion: extension- 0 degrees; flexion- 125 degrees  Valgus/varus stability- stable  Swelling-mild  There is numbness and dysesthesia lateral to the distal incision    Assessment:     Imaging:  None today        1. S/P left knee surgery    2. Arthritis of knee, left        The " patient's progress is normal. The discomfort is secondary to the surgical scar.        Plan:          No follow-ups on file.    I explained my impression the natural history of recovery    Appropriate exercise emphasizing extension is recommended    No more forced flexion    Desensitization explained

## 2019-05-13 ENCOUNTER — CLINICAL SUPPORT (OUTPATIENT)
Dept: REHABILITATION | Facility: HOSPITAL | Age: 64
End: 2019-05-13
Attending: ORTHOPAEDIC SURGERY
Payer: MEDICAID

## 2019-05-13 DIAGNOSIS — M25.662 DECREASED RANGE OF MOTION (ROM) OF LEFT KNEE: ICD-10-CM

## 2019-05-13 DIAGNOSIS — R29.898 DECREASED STRENGTH OF LOWER EXTREMITY: ICD-10-CM

## 2019-05-13 DIAGNOSIS — R26.89 IMPAIRED GAIT AND MOBILITY: ICD-10-CM

## 2019-05-13 DIAGNOSIS — G89.29 CHRONIC PAIN OF LEFT KNEE: ICD-10-CM

## 2019-05-13 DIAGNOSIS — M25.562 CHRONIC PAIN OF LEFT KNEE: ICD-10-CM

## 2019-05-13 PROCEDURE — 97110 THERAPEUTIC EXERCISES: CPT | Mod: PN

## 2019-05-13 NOTE — PROGRESS NOTES
"  Physical Therapy Daily Treatment Note     Name: Martha Sevilla  Clinic Number: [id number]    Therapy Diagnosis:   Encounter Diagnoses   Name Primary?    Chronic pain of left knee     Decreased range of motion (ROM) of left knee     Decreased strength of lower extremity     Impaired gait and mobility      Physician: Niraj Pérez MD    Visit Date: 5/13/2019    Physician Orders: PT Eval and Treat   Medical Diagnosis from Referral:   M17.12 (ICD-10-CM) - Arthritis of knee, left   Z96.652 (ICD-10-CM) - History of left knee replacement   Evaluation Date: 4/3/2019  Authorization Period Expiration: 3/18/2020  Plan of Care Expiration: 5/31/2019  Visit # / Visits authorized: 13/50  FOTO: 2/10    Time In: 1105  Time Out: 1156  Total Billable Time: 24 minutes    Precautions: Standard, fall    Subjective     Pt reports: her  fell and knocked her over last Friday. Patient reports the outside of her knee is sore but aside from that has no issues.  She was compliant with home exercise program.  Response to previous treatment: no change from last session  Functional change: no change from last session    Pain: 4/10  Location: L knee      Objective     Martha received therapeutic exercises to develop strength, ROM and flexibility for 14 minutes 1:1 and 27 minutes supervised including:     Stationary bike: 5' full forwards revolutions at end of session    Fitter gastroc stretch: 3x30" double leg  Stair hamstring stretch: 3x30" B  Knee ROM @ stairs: 5"x20 L knee    Forward step ups: 6" step 2x15 L  Lateral step ups: 6" step 2x15 L  Forward step downs: 6" step 2x10 L  Squats: to ~45-60 degrees 2x10; B UE support  Cybex leg press: 4.0 plates 2x10 double leg    Martha received gait training for 10 minutes without AD including:   Level walking for 300 feet with continuous pattern; supervision and cues for equal weight bearing   Navigation up and down 6" curbs x 4 reps; supervision  Navigation on grassy plain; " supervision  Navigation up and down grassy slope x3; contact guard assistance    Martha received cold pack to B knee for 5 minutes to decrease pain and inflammation.     Home Exercises Provided and Patient Education Provided     Education provided:   - Continue HEP   - Continue household ambulation without AD; begin short community distances without AD including grassy plains    Written Home Exercises Provided: No.  Exercises were reviewed and Martha was able to demonstrate them prior to the end of the session.  Martha demonstrated good  understanding of the education provided.     See EMR under Patient Instructions for exercises provided 4/3/19.    Assessment     Improved endurance with level walking today. Fair-good performance of ambulating on unlevel surfaces; hesitancy 2/2 fear on initial grassy slope navigation, improved with increasing trials fairly well without AD; requires rest break due to fatigue. Fear avoidance contributing to speed and landing of step downs; improves with increasing reps.    Martha is progressing well towards her goals.   Pt prognosis is Good.   Pt will continue to benefit from skilled outpatient physical therapy to address the deficits listed in the problem list box on initial evaluation, provide pt/family education and to maximize pt's level of independence in the home and community environment.     Pt's spiritual, cultural and educational needs considered and pt agreeable to plan of care and goals.  Anticipated barriers to physical therapy: comorbidities     Goals:   Short Term Goals:  1. Pt will be independent with HEP supplement PT in improving functional mobility MET    2. Pt will improve L LE strength to at least 75% of R LE strength as measured via MicroFet handheld dynamometer in order to improve functional mobility MET  3. Pt will improve L knee AROM to at least 5-100 in order to improve gait MET  Long Term Goals:  1. Pt will improve L LE strength to at least 90%  of R LE strength as measured via MicroFet handheld dynamometer in order to improve functional mobility PARTIALLY MET 5/13/2019   2. Pt will improve L knee AROM to at least 3-120 in order to improve gait and ability to perform ADLs PARTIALLY MET 5/13/2019   3. Pt will improve FOTO knee survey score to </= 44% limited in order to demo improved functional mobility PROGRESSING, NOT MET 5/13/2019   4. Pt will perform TUG in < 10 seconds without AD in order to demo improved gait speed PROGRESSING, NOT MET 5/13/2019   5. Pt will perform at least 10 sit to stands without UE support on 30 second sit to stand test in order to demo improved ability to perform transfers PROGRESSING, NOT MET 5/13/2019     Plan     Continue plan of care. Gait train in- and out-doors without AD.     Prerna Sharma, PT

## 2019-05-15 ENCOUNTER — CLINICAL SUPPORT (OUTPATIENT)
Dept: REHABILITATION | Facility: HOSPITAL | Age: 64
End: 2019-05-15
Attending: ORTHOPAEDIC SURGERY
Payer: MEDICAID

## 2019-05-15 DIAGNOSIS — M25.562 CHRONIC PAIN OF LEFT KNEE: ICD-10-CM

## 2019-05-15 DIAGNOSIS — R26.89 IMPAIRED GAIT AND MOBILITY: ICD-10-CM

## 2019-05-15 DIAGNOSIS — G89.29 CHRONIC PAIN OF LEFT KNEE: ICD-10-CM

## 2019-05-15 DIAGNOSIS — R29.898 DECREASED STRENGTH OF LOWER EXTREMITY: ICD-10-CM

## 2019-05-15 DIAGNOSIS — M25.662 DECREASED RANGE OF MOTION (ROM) OF LEFT KNEE: ICD-10-CM

## 2019-05-15 PROCEDURE — 97110 THERAPEUTIC EXERCISES: CPT | Mod: PN

## 2019-05-15 PROCEDURE — 97116 GAIT TRAINING THERAPY: CPT | Mod: PN

## 2019-05-15 NOTE — PROGRESS NOTES
"  Physical Therapy Daily Treatment Note     Name: Martha Sevilla  Clinic Number: [id number]    Therapy Diagnosis:   No diagnosis found.  Physician: Niraj Pérez MD    Visit Date: 5/15/2019    Physician Orders: PT Eval and Treat   Medical Diagnosis from Referral:   M17.12 (ICD-10-CM) - Arthritis of knee, left   Z96.652 (ICD-10-CM) - History of left knee replacement   Evaluation Date: 4/3/2019  Authorization Period Expiration: 3/18/2020  Plan of Care Expiration: 5/31/2019  Visit # / Visits authorized: 14/50  FOTO: 2/10    Time In: 1105  Time Out: 1205  Total Billable Time:45 minutes    Precautions: Standard, fall    Subjective     Pt reports: she walked a lot yesterday and her L knee is hurting a bit more than usual.    She was compliant with home exercise program.  Response to previous treatment: no change from last session  Functional change: no change from last session    Pain: 4/10  Location: L knee      Objective     Martha received therapeutic exercises to develop strength, ROM and flexibility for 45 minutes 1:1 and 15 minutes supervised including:     Stationary bike: 5' full forwards revolutions at end of session    Fitter gastroc stretch: 3x30" double leg  Stair hamstring stretch: 3x30" B  Knee ROM @ stairs: 5"x20 L knee    Forward step ups: 6" step 2x15 L  Lateral step ups: 6" step 2x15 L  Forward step downs: 6" step 2x10 L attempted but np secondary to LLE mm fatigue  Squats: to ~45-60 degrees 2x10; B UE support  Cybex leg press: 4.0 plates 2x10 double leg np  Shuttle leg press: 1 black cord 2x15 double leg    Martha received gait training for 8 minutes without AD including:   Navigation up and down 6" curbs x 4 reps; supervision  Navigation on grassy plain; supervision  Navigation up and down grassy slope x3; contact guard assistance/SBA    Martha received cold pack to B knee for 5 minutes to decrease pain and inflammation.     Home Exercises Provided and Patient Education Provided "     Education provided:   - Continue HEP   - Continue household ambulation without AD; begin short community distances without AD including grassy plains    Written Home Exercises Provided: No.  Exercises were reviewed and Martha was able to demonstrate them prior to the end of the session.  Martha demonstrated good  understanding of the education provided.     See EMR under Patient Instructions for exercises provided 4/3/19.    Assessment     Pt tolerated tx well but required multiple rest breaks due to mm fatigue. Pt ambulated grassy slope with less hesitation and increased confidence. It was noted that pt did not perform step downs secondary to fatigue but will resume during that exercise during future tx visits.      Martha is progressing well towards her goals.   Pt prognosis is Good.   Pt will continue to benefit from skilled outpatient physical therapy to address the deficits listed in the problem list box on initial evaluation, provide pt/family education and to maximize pt's level of independence in the home and community environment.     Pt's spiritual, cultural and educational needs considered and pt agreeable to plan of care and goals.  Anticipated barriers to physical therapy: comorbidities     Goals:   Short Term Goals:  1. Pt will be independent with HEP supplement PT in improving functional mobility MET    2. Pt will improve L LE strength to at least 75% of R LE strength as measured via MicroFet handheld dynamometer in order to improve functional mobility MET  3. Pt will improve L knee AROM to at least 5-100 in order to improve gait MET  Long Term Goals:  1. Pt will improve L LE strength to at least 90% of R LE strength as measured via MicroFet handheld dynamometer in order to improve functional mobility PARTIALLY MET 5/15/2019   2. Pt will improve L knee AROM to at least 3-120 in order to improve gait and ability to perform ADLs PARTIALLY MET 5/15/2019   3. Pt will improve FOTO knee survey  score to </= 44% limited in order to demo improved functional mobility PROGRESSING, NOT MET 5/15/2019   4. Pt will perform TUG in < 10 seconds without AD in order to demo improved gait speed PROGRESSING, NOT MET 5/15/2019   5. Pt will perform at least 10 sit to stands without UE support on 30 second sit to stand test in order to demo improved ability to perform transfers PROGRESSING, NOT MET 5/15/2019     Plan     Continue plan of care. Gait train in- and out-doors without AD.     Yakov Rodriguez, PTA

## 2019-05-20 ENCOUNTER — CLINICAL SUPPORT (OUTPATIENT)
Dept: REHABILITATION | Facility: HOSPITAL | Age: 64
End: 2019-05-20
Attending: ORTHOPAEDIC SURGERY
Payer: MEDICAID

## 2019-05-20 DIAGNOSIS — M25.662 DECREASED RANGE OF MOTION (ROM) OF LEFT KNEE: ICD-10-CM

## 2019-05-20 DIAGNOSIS — M25.562 CHRONIC PAIN OF LEFT KNEE: ICD-10-CM

## 2019-05-20 DIAGNOSIS — R26.89 IMPAIRED GAIT AND MOBILITY: ICD-10-CM

## 2019-05-20 DIAGNOSIS — G89.29 CHRONIC PAIN OF LEFT KNEE: ICD-10-CM

## 2019-05-20 DIAGNOSIS — R29.898 DECREASED STRENGTH OF LOWER EXTREMITY: ICD-10-CM

## 2019-05-20 PROCEDURE — 97110 THERAPEUTIC EXERCISES: CPT | Mod: PN

## 2019-05-20 NOTE — PROGRESS NOTES
"  Physical Therapy Daily Treatment Note     Name: Martha Sevilla  Clinic Number: [id number]    Therapy Diagnosis:   Encounter Diagnoses   Name Primary?    Chronic pain of left knee     Decreased range of motion (ROM) of left knee     Decreased strength of lower extremity     Impaired gait and mobility      Physician: Niraj Pérez MD    Visit Date: 2019    Physician Orders: PT Eval and Treat   Medical Diagnosis from Referral:   M17.12 (ICD-10-CM) - Arthritis of knee, left   Z96.652 (ICD-10-CM) - History of left knee replacement   Evaluation Date: 4/3/2019  Authorization Period Expiration: 3/18/2020  Plan of Care Expiration: 2019  Visit # / Visits authorized: 15/50  FOTO: 3/10    Time In: 1101  Time Out: 1158  Total Billable Time: 27 minutes    Precautions: Standard, fall    Subjective     Pt reports: fatigued with heat today.   She was compliant with home exercise program.  Response to previous treatment: improved LE strength  Functional change: walking on grassy surfaces and tolerance to step downs better    Pain: 3-4/10 before session; 0/10 after  Location: L knee      Objective     Martha received therapeutic exercises to develop strength, ROM and flexibility for 30 minutes supervised and 27 minutes supervised including:     AROM: 3-115 degrees   PROM: 0-116 degrees    L/E Strength w/ MicroFET Muscle Verna Dynamometer Right Left Pain/Dysfunction with Movement   (approx 4 sec hold w/ max contraction)   Hip Flexion 14.5 kg  14.9 kg     Hip Abduction 20.6 kg  21.3 kg     Quadriceps 13.6 kg  13.3 kg     Hamstrings 18.8 kg  16.9 kg       TU seconds (w/o assistive device)  30 second sit-to-stand test (without UE support): 0 without UE support; 10 with UE support    Stationary bike: 5' full forwards revolutions at beginning of session    Fitter gastroc stretch: 3x30" double leg  Stair hamstring stretch: 3x30" B  Knee ROM @ stairs: 5"x20 L knee    Forward step ups: 6" step 2x15 L  Lateral " "step ups: 6" step 2x15 L  Forward step downs: 6" step 2x10 L   Squats: to ~45-60 degrees 2x10; B UE support  Cybex leg press: 4.0 plates 2x10 double leg  Cybex hamstring curls: 3.0 plates 2x10 double leg    Home Exercises Provided and Patient Education Provided     Education provided:   - Continue HEP   - Continue household ambulation and short community distances without AD including grassy plains.    Written Home Exercises Provided: No.  Exercises were reviewed and Martha was able to demonstrate them prior to the end of the session.  Martha demonstrated good  understanding of the education provided.     See EMR under Patient Instructions for exercises provided 4/3/19.    Assessment     Patient progressing towards long term goals; L knee flexion range functional, strength comparable between B LE, and gait and overall functional mobility good without AD. Would benefit from transition to gym soon.    Martha is progressing well towards her goals.   Pt prognosis is Good.   Pt will continue to benefit from skilled outpatient physical therapy to address the deficits listed in the problem list box on initial evaluation, provide pt/family education and to maximize pt's level of independence in the home and community environment.     Pt's spiritual, cultural and educational needs considered and pt agreeable to plan of care and goals.  Anticipated barriers to physical therapy: comorbidities     Goals:   Short Term Goals:  1. Pt will be independent with HEP supplement PT in improving functional mobility MET    2. Pt will improve L LE strength to at least 75% of R LE strength as measured via MicroFet handheld dynamometer in order to improve functional mobility MET  3. Pt will improve L knee AROM to at least 5-100 in order to improve gait MET  Long Term Goals:  1. Pt will improve L LE strength to at least 90% of R LE strength as measured via MicroFet handheld dynamometer in order to improve functional mobility " PARTIALLY MET 5/20/2019   2. Pt will improve L knee AROM to at least 3-120 in order to improve gait and ability to perform ADLs PARTIALLY MET 5/20/2019   3. Pt will improve FOTO knee survey score to </= 44% limited in order to demo improved functional mobility PROGRESSING, NOT MET 5/20/2019   4. Pt will perform TUG in < 10 seconds without AD in order to demo improved gait speed MET 5/20/2019   5. Pt will perform at least 10 sit to stands without UE support on 30 second sit to stand test in order to demo improved ability to perform transfers PARTIALLY MET 5/20/2019     Plan     Continue plan of care. Improve flexion to 120 degrees. Gait train on grassy slopes.     Prerna Sharma, PT

## 2019-05-29 ENCOUNTER — TELEPHONE (OUTPATIENT)
Dept: REHABILITATION | Facility: HOSPITAL | Age: 64
End: 2019-05-29

## 2019-05-29 DIAGNOSIS — M25.662 DECREASED RANGE OF MOTION (ROM) OF LEFT KNEE: ICD-10-CM

## 2019-05-29 DIAGNOSIS — R26.89 IMPAIRED GAIT AND MOBILITY: ICD-10-CM

## 2019-05-29 DIAGNOSIS — M25.562 CHRONIC PAIN OF LEFT KNEE: ICD-10-CM

## 2019-05-29 DIAGNOSIS — R29.898 DECREASED STRENGTH OF LOWER EXTREMITY: ICD-10-CM

## 2019-05-29 DIAGNOSIS — G89.29 CHRONIC PAIN OF LEFT KNEE: ICD-10-CM

## 2019-07-05 ENCOUNTER — DOCUMENTATION ONLY (OUTPATIENT)
Dept: REHABILITATION | Facility: HOSPITAL | Age: 64
End: 2019-07-05

## 2019-07-05 DIAGNOSIS — G89.29 CHRONIC PAIN OF LEFT KNEE: ICD-10-CM

## 2019-07-05 DIAGNOSIS — M25.562 CHRONIC PAIN OF LEFT KNEE: ICD-10-CM

## 2019-07-05 DIAGNOSIS — R29.898 DECREASED STRENGTH OF LOWER EXTREMITY: ICD-10-CM

## 2019-07-05 DIAGNOSIS — R26.89 IMPAIRED GAIT AND MOBILITY: ICD-10-CM

## 2019-07-05 DIAGNOSIS — M25.662 DECREASED RANGE OF MOTION (ROM) OF LEFT KNEE: ICD-10-CM

## 2019-07-05 NOTE — PROGRESS NOTES
Pt was evaluated on 4/3/2019 and was seen 15 times for PT. Pt has not attended PT since 2019 due to death in the family. Patient reported she would call to reschedule however has not. Plan of care  2019. Current status is unknown. Pt to be d/c'd at this time.

## 2019-11-19 ENCOUNTER — OFFICE VISIT (OUTPATIENT)
Dept: ORTHOPEDICS | Facility: CLINIC | Age: 64
End: 2019-11-19
Payer: MEDICAID

## 2019-11-19 VITALS — HEIGHT: 67 IN | WEIGHT: 270 LBS | BODY MASS INDEX: 42.38 KG/M2

## 2019-11-19 DIAGNOSIS — M17.12 ARTHRITIS OF KNEE, LEFT: Primary | ICD-10-CM

## 2019-11-19 DIAGNOSIS — Z98.890 S/P LEFT KNEE SURGERY: ICD-10-CM

## 2019-11-19 PROCEDURE — 99999 PR PBB SHADOW E&M-EST. PATIENT-LVL III: ICD-10-PCS | Mod: PBBFAC,,, | Performed by: ORTHOPAEDIC SURGERY

## 2019-11-19 PROCEDURE — 99212 OFFICE O/P EST SF 10 MIN: CPT | Mod: S$PBB,,, | Performed by: ORTHOPAEDIC SURGERY

## 2019-11-19 PROCEDURE — 99999 PR PBB SHADOW E&M-EST. PATIENT-LVL III: CPT | Mod: PBBFAC,,, | Performed by: ORTHOPAEDIC SURGERY

## 2019-11-19 PROCEDURE — 99213 OFFICE O/P EST LOW 20 MIN: CPT | Mod: PBBFAC,PN | Performed by: ORTHOPAEDIC SURGERY

## 2019-11-19 PROCEDURE — 99212 PR OFFICE/OUTPT VISIT, EST, LEVL II, 10-19 MIN: ICD-10-PCS | Mod: S$PBB,,, | Performed by: ORTHOPAEDIC SURGERY

## 2020-01-01 NOTE — PROGRESS NOTES
"Subjective:      Patient ID: Martha Sevilla is a 63 y.o. female.    Chief Complaint: Follow-up of the Left Knee      HPI:  Six months postop  The patient is seen for postop follow-up of left  TKA.  Pain control has been Generally satisfactory with occasional clunk and start-up pain  They feel that they are ambulating easily  Preoperative complaints include:  As above      Current Outpatient Medications:     aspirin (ECOTRIN) 81 MG EC tablet, Take 1 tablet (81 mg total) by mouth once daily., Disp: 100 tablet, Rfl: 0    ibuprofen (ADVIL,MOTRIN) 600 MG tablet, TK 1 T PO Q 6 H PRN. TK WF, Disp: , Rfl: 0  Review of patient's allergies indicates:  No Known Allergies    Ht 5' 7" (1.702 m)   Wt 122.5 kg (270 lb)   LMP  (LMP Unknown)   BMI 42.29 kg/m²     ROS        Objective:    Ortho Exam          Alert, oriented, no acute distress  Sclera-Normal  Respiratory distress-none  Gait no definite limp  Incision:  Normally healed, no effusion  Range of motion: extension- 0 degrees; flexion- 120 degrees  Valgus/varus stability- stable  Swelling-none  Distal perfusion-intact  Distal neurologic-intact    Imaging:  None    Assessment:             1. Arthritis of knee, left    2. S/P left knee surgery        The implant is functioning normally.  The patellar clunking and scar sensitivity is typical for the procedure. I explained this        Plan:          No follow-ups on file.    I explained the natural history of recovery    Home desensitization modalities explained    X-ray 1 year follow-up        " Baby boy born at 41 wks via C/S due to failure to progress. Mother is a 23 y/o  who is A+ blood type, HBsAg neg, HIV neg, rubella (immune as per sheet - no hard copy on chart), RPR neg, Covid neg as of  and GBS neg as of . Maternal history of PCOS. No significant  prenatal history. AROM at 0729 with light mec fluids. Baby emerged vigorous, crying so cord clamping was delayed 30secs. Infant w/d/s/s with APGARS of 9/9. Mom would like to breast feed only and declined to birth dose of Hep B. EOS 0.11

## 2020-02-08 ENCOUNTER — HOSPITAL ENCOUNTER (EMERGENCY)
Facility: HOSPITAL | Age: 65
Discharge: HOME | End: 2020-02-08
Attending: EMERGENCY MEDICINE
Payer: MEDICAID

## 2020-02-08 VITALS
OXYGEN SATURATION: 98 % | HEART RATE: 80 BPM | BODY MASS INDEX: 41.78 KG/M2 | DIASTOLIC BLOOD PRESSURE: 81 MMHG | WEIGHT: 260 LBS | HEIGHT: 66 IN | RESPIRATION RATE: 20 BRPM | TEMPERATURE: 98 F | SYSTOLIC BLOOD PRESSURE: 178 MMHG

## 2020-02-08 DIAGNOSIS — R07.9 CHEST PAIN: ICD-10-CM

## 2020-02-08 DIAGNOSIS — I10 HYPERTENSION, UNSPECIFIED TYPE: Primary | ICD-10-CM

## 2020-02-08 LAB
ALBUMIN SERPL BCP-MCNC: 4.2 G/DL (ref 3.5–5.2)
ALP SERPL-CCNC: 92 U/L (ref 38–126)
ALT SERPL W/O P-5'-P-CCNC: 25 U/L (ref 10–44)
ANION GAP SERPL CALC-SCNC: 6 MMOL/L (ref 8–16)
AST SERPL-CCNC: 34 U/L (ref 15–46)
BASOPHILS # BLD AUTO: 0.05 K/UL (ref 0–0.2)
BASOPHILS NFR BLD: 0.6 % (ref 0–1.9)
BILIRUB SERPL-MCNC: 0.6 MG/DL (ref 0.1–1)
BUN SERPL-MCNC: 12 MG/DL (ref 7–17)
CALCIUM SERPL-MCNC: 8.9 MG/DL (ref 8.7–10.5)
CHLORIDE SERPL-SCNC: 101 MMOL/L (ref 95–110)
CO2 SERPL-SCNC: 32 MMOL/L (ref 23–29)
CREAT SERPL-MCNC: 0.94 MG/DL (ref 0.5–1.4)
DIFFERENTIAL METHOD: NORMAL
EOSINOPHIL # BLD AUTO: 0.1 K/UL (ref 0–0.5)
EOSINOPHIL NFR BLD: 1.4 % (ref 0–8)
ERYTHROCYTE [DISTWIDTH] IN BLOOD BY AUTOMATED COUNT: 12.3 % (ref 11.5–14.5)
EST. GFR  (AFRICAN AMERICAN): >60 ML/MIN/1.73 M^2
EST. GFR  (NON AFRICAN AMERICAN): >60 ML/MIN/1.73 M^2
GLUCOSE SERPL-MCNC: 111 MG/DL (ref 70–110)
HCT VFR BLD AUTO: 43.8 % (ref 37–48.5)
HGB BLD-MCNC: 14.2 G/DL (ref 12–16)
IMM GRANULOCYTES # BLD AUTO: 0.01 K/UL (ref 0–0.04)
IMM GRANULOCYTES NFR BLD AUTO: 0.1 % (ref 0–0.5)
LYMPHOCYTES # BLD AUTO: 2.1 K/UL (ref 1–4.8)
LYMPHOCYTES NFR BLD: 25.8 % (ref 18–48)
MCH RBC QN AUTO: 30.5 PG (ref 27–31)
MCHC RBC AUTO-ENTMCNC: 32.4 G/DL (ref 32–36)
MCV RBC AUTO: 94 FL (ref 82–98)
MONOCYTES # BLD AUTO: 0.8 K/UL (ref 0.3–1)
MONOCYTES NFR BLD: 9.3 % (ref 4–15)
NEUTROPHILS # BLD AUTO: 5.2 K/UL (ref 1.8–7.7)
NEUTROPHILS NFR BLD: 62.8 % (ref 38–73)
NRBC BLD-RTO: 0 /100 WBC
PLATELET # BLD AUTO: 192 K/UL (ref 150–350)
PMV BLD AUTO: 11.2 FL (ref 9.2–12.9)
POTASSIUM SERPL-SCNC: 4 MMOL/L (ref 3.5–5.1)
PROT SERPL-MCNC: 8.6 G/DL (ref 6–8.4)
RBC # BLD AUTO: 4.65 M/UL (ref 4–5.4)
SODIUM SERPL-SCNC: 139 MMOL/L (ref 136–145)
TROPONIN I SERPL DL<=0.01 NG/ML-MCNC: <0.012 NG/ML (ref 0.01–0.03)
TROPONIN I SERPL DL<=0.01 NG/ML-MCNC: <0.012 NG/ML (ref 0.01–0.03)
WBC # BLD AUTO: 8.28 K/UL (ref 3.9–12.7)

## 2020-02-08 PROCEDURE — 99283 EMERGENCY DEPT VISIT LOW MDM: CPT | Mod: 25,ER

## 2020-02-08 PROCEDURE — 93005 ELECTROCARDIOGRAM TRACING: CPT | Mod: ER | Performed by: INTERNAL MEDICINE

## 2020-02-08 PROCEDURE — 93010 ELECTROCARDIOGRAM REPORT: CPT | Mod: ,,, | Performed by: INTERNAL MEDICINE

## 2020-02-08 PROCEDURE — 93010 EKG 12-LEAD: ICD-10-PCS | Mod: ,,, | Performed by: INTERNAL MEDICINE

## 2020-02-08 PROCEDURE — 80053 COMPREHEN METABOLIC PANEL: CPT | Mod: ER

## 2020-02-08 PROCEDURE — 25000003 PHARM REV CODE 250: Mod: ER | Performed by: EMERGENCY MEDICINE

## 2020-02-08 PROCEDURE — 85025 COMPLETE CBC W/AUTO DIFF WBC: CPT | Mod: ER

## 2020-02-08 PROCEDURE — 93005 ELECTROCARDIOGRAM TRACING: CPT | Mod: ER

## 2020-02-08 PROCEDURE — 84484 ASSAY OF TROPONIN QUANT: CPT | Mod: 91,ER

## 2020-02-08 RX ORDER — METOPROLOL TARTRATE 50 MG/1
50 TABLET ORAL
Status: COMPLETED | OUTPATIENT
Start: 2020-02-08 | End: 2020-02-08

## 2020-02-08 RX ORDER — AMLODIPINE BESYLATE 5 MG/1
10 TABLET ORAL DAILY
Qty: 30 TABLET | Refills: 1 | Status: ON HOLD | OUTPATIENT
Start: 2020-02-08 | End: 2021-05-20

## 2020-02-08 RX ORDER — ASPIRIN 325 MG
325 TABLET ORAL
Status: COMPLETED | OUTPATIENT
Start: 2020-02-08 | End: 2020-02-08

## 2020-02-08 RX ORDER — AMLODIPINE BESYLATE 5 MG/1
5 TABLET ORAL
Status: COMPLETED | OUTPATIENT
Start: 2020-02-08 | End: 2020-02-08

## 2020-02-08 RX ADMIN — AMLODIPINE BESYLATE 5 MG: 5 TABLET ORAL at 04:02

## 2020-02-08 RX ADMIN — METOPROLOL TARTRATE 50 MG: 50 TABLET ORAL at 06:02

## 2020-02-08 RX ADMIN — ASPIRIN 325 MG ORAL TABLET 325 MG: 325 PILL ORAL at 04:02

## 2020-02-08 NOTE — ED PROVIDER NOTES
"Encounter Date: 2/8/2020       History     Chief Complaint   Patient presents with    Chest Pain     "I started with chest pain at 3pm today while I  was just sitting sitting down" points to center of chest. Denies SOB or sweating or nausea.      64-year-old female presents emergency department complaining of chest pain.  Onset around 3:00 p.m..  States she had an acute throbbing/aching substernal chest pain.  It was constant for 15 or 20 min.  She tried lying down and taking a baby aspirin without relief.  It did not get worse with positional changes or with exertion.  She denies any associated shortness of breath. Symptoms resolved as she was getting ready to come to the emergency room.  No recurrence of her symptoms. Denies any other symptoms, including headache, lightheadedness, dizziness, nausea, sweats.        Review of patient's allergies indicates:  No Known Allergies  History reviewed. No pertinent past medical history.  Past Surgical History:   Procedure Laterality Date    Evacuation of hematoma of leg Right     KNEE ARTHROPLASTY Left 3/18/2019    Procedure: ARTHROPLASTY, KNEE left;  Surgeon: Niraj Pérez MD;  Location: [facility];  Service: Orthopedics;  Laterality: Left;    TUBAL LIGATION       History reviewed. No pertinent family history.  Social History     Tobacco Use    Smoking status: Never Smoker    Smokeless tobacco: Never Used   Substance Use Topics    Alcohol use: No    Drug use: No     Review of Systems   Constitutional: Negative for chills, fatigue and fever.   HENT: Negative for congestion, sore throat and voice change.    Eyes: Negative for photophobia, pain and redness.   Respiratory: Negative for cough, choking and shortness of breath.    Cardiovascular: Positive for chest pain. Negative for palpitations and leg swelling.   Gastrointestinal: Negative for abdominal pain, diarrhea, nausea and vomiting.   Genitourinary: Negative for dysuria, frequency and urgency. "   Musculoskeletal: Negative for back pain, neck pain and neck stiffness.   Neurological: Negative for light-headedness, numbness and headaches.   All other systems reviewed and are negative.      Physical Exam     Initial Vitals [02/08/20 1537]   BP Pulse Resp Temp SpO2   (!) 192/91 94 19 98.2 °F (36.8 °C) 100 %      MAP       --         Physical Exam    Nursing note and vitals reviewed.  Constitutional: She appears well-developed and well-nourished. No distress.   HENT:   Head: Normocephalic and atraumatic.   Mouth/Throat: Oropharynx is clear and moist.   Eyes: Conjunctivae and EOM are normal. Pupils are equal, round, and reactive to light.   Neck: Normal range of motion. Neck supple. No tracheal deviation present.   Cardiovascular: Normal rate, regular rhythm, normal heart sounds and intact distal pulses.   Pulmonary/Chest: Breath sounds normal. No respiratory distress. She has no wheezes. She has no rhonchi. She has no rales.   Abdominal: Soft. Bowel sounds are normal. She exhibits no distension. There is no tenderness.   Musculoskeletal: Normal range of motion. She exhibits no edema or tenderness.   Neurological: She is alert and oriented to person, place, and time. She has normal strength. No cranial nerve deficit.   Skin: Skin is warm and dry.         ED Course   Procedures  Labs Reviewed   COMPREHENSIVE METABOLIC PANEL - Abnormal; Notable for the following components:       Result Value    CO2 32 (*)     Glucose 111 (*)     Total Protein 8.6 (*)     Anion Gap 6 (*)     All other components within normal limits   CBC W/ AUTO DIFFERENTIAL   TROPONIN I   TROPONIN I     EKG Readings: (Independently Interpreted)   Initial Reading: No STEMI. Previous EKG: Compared with most recent EKG Previous EKG Date: 4/7/19 (Minimal change) . Rhythm: Normal Sinus Rhythm. Heart Rate: 91. Ectopy: No Ectopy. Conduction: Normal. ST Segments: Normal ST Segments. T Waves: Normal. Axis: Normal.         X-Rays:   Independently  Interpreted Readings:   Other Readings:  Chest x-ray interpreted by radiologist and visualized by me    Imaging Results          X-Ray Chest PA And Lateral (Final result)  Result time 02/08/20 16:17:10    Final result by Phu Ortiz MD (02/08/20 16:17:10)                 Impression:      No acute findings.      Electronically signed by: Phu Ortiz MD  Date:    02/08/2020  Time:    16:17             Narrative:    EXAMINATION:  XR CHEST PA AND LATERAL    CLINICAL HISTORY:  Chest Pain;    TECHNIQUE:  PA and lateral views of the chest were performed.    COMPARISON:  04/07/2019    FINDINGS:  The cardiac and mediastinal silhouettes appear within normal limits.   The lungs are clear bilaterally.  Old right-sided rib fractures.                                Medical Decision Making:   Initial Assessment:   64-year-old female presents emergency department complaining of chest pain  Differential Diagnosis:   ACS, dissection, pneumonia, pneumothorax, musculoskeletal, GERD  Independently Interpreted Test(s):   I have ordered and independently interpreted X-rays - see prior notes.  I have ordered and independently interpreted EKG Reading(s) - see prior notes  Clinical Tests:   Lab Tests: Reviewed       <> Summary of Lab: Benign  ED Management:  Patient given an aspirin and some amlodipine in the emergency department.  Blood pressures improved somewhat.  Patient has had no further complaints and her repeat troponin is negative.  Instructed on follow-up with primary care physician, home management techniques, reasons to return to the emergency room.                                 Clinical Impression:       ICD-10-CM ICD-9-CM   1. Hypertension, unspecified type I10 401.9   2. Chest pain R07.9 786.50         Disposition:   Disposition: Discharged  Condition: Stable                     Arron Rutherford MD  02/11/20 0601

## 2020-02-09 NOTE — DISCHARGE INSTRUCTIONS
Take your blood pressure medicine every day.  Follow a low-salt diet.  Call your doctor on Monday.  Return here at any time

## 2020-04-17 DIAGNOSIS — M25.562 LEFT KNEE PAIN, UNSPECIFIED CHRONICITY: ICD-10-CM

## 2020-04-17 RX ORDER — AMITRIPTYLINE HYDROCHLORIDE 50 MG/1
TABLET, FILM COATED ORAL
Qty: 30 TABLET | Refills: 2 | OUTPATIENT
Start: 2020-04-17

## 2020-08-15 ENCOUNTER — HOSPITAL ENCOUNTER (EMERGENCY)
Facility: HOSPITAL | Age: 65
Discharge: HOME | End: 2020-08-15
Attending: EMERGENCY MEDICINE
Payer: MEDICAID

## 2020-08-15 VITALS
TEMPERATURE: 99 F | RESPIRATION RATE: 17 BRPM | DIASTOLIC BLOOD PRESSURE: 83 MMHG | SYSTOLIC BLOOD PRESSURE: 187 MMHG | HEART RATE: 102 BPM | OXYGEN SATURATION: 99 %

## 2020-08-15 DIAGNOSIS — R23.3 PETECHIAL RASH: Primary | ICD-10-CM

## 2020-08-15 DIAGNOSIS — S70.01XA HEMATOMA OF RIGHT HIP, INITIAL ENCOUNTER: ICD-10-CM

## 2020-08-15 LAB
ALBUMIN SERPL BCP-MCNC: 3.4 G/DL (ref 3.5–5.2)
ALP SERPL-CCNC: 82 U/L (ref 55–135)
ALT SERPL W/O P-5'-P-CCNC: 23 U/L (ref 10–44)
ANION GAP SERPL CALC-SCNC: 7 MMOL/L (ref 8–16)
APTT BLDCRRT: 26.5 SEC (ref 21–32)
AST SERPL-CCNC: 24 U/L (ref 10–40)
BASOPHILS # BLD AUTO: 0.03 K/UL (ref 0–0.2)
BASOPHILS NFR BLD: 0.4 % (ref 0–1.9)
BILIRUB SERPL-MCNC: 0.7 MG/DL (ref 0.1–1)
BUN SERPL-MCNC: 10 MG/DL (ref 8–23)
CALCIUM SERPL-MCNC: 9 MG/DL (ref 8.7–10.5)
CHLORIDE SERPL-SCNC: 104 MMOL/L (ref 95–110)
CO2 SERPL-SCNC: 27 MMOL/L (ref 23–29)
CREAT SERPL-MCNC: 1 MG/DL (ref 0.5–1.4)
DIFFERENTIAL METHOD: ABNORMAL
EOSINOPHIL # BLD AUTO: 0.2 K/UL (ref 0–0.5)
EOSINOPHIL NFR BLD: 2.8 % (ref 0–8)
ERYTHROCYTE [DISTWIDTH] IN BLOOD BY AUTOMATED COUNT: 12.4 % (ref 11.5–14.5)
EST. GFR  (AFRICAN AMERICAN): >60 ML/MIN/1.73 M^2
EST. GFR  (NON AFRICAN AMERICAN): >60 ML/MIN/1.73 M^2
GLUCOSE SERPL-MCNC: 188 MG/DL (ref 70–110)
HCT VFR BLD AUTO: 40.1 % (ref 37–48.5)
HGB BLD-MCNC: 13.3 G/DL (ref 12–16)
IMM GRANULOCYTES # BLD AUTO: 0.02 K/UL (ref 0–0.04)
IMM GRANULOCYTES NFR BLD AUTO: 0.3 % (ref 0–0.5)
INR PPP: 1 (ref 0.8–1.2)
LYMPHOCYTES # BLD AUTO: 1.5 K/UL (ref 1–4.8)
LYMPHOCYTES NFR BLD: 22.4 % (ref 18–48)
MCH RBC QN AUTO: 31.2 PG (ref 27–31)
MCHC RBC AUTO-ENTMCNC: 33.2 G/DL (ref 32–36)
MCV RBC AUTO: 94 FL (ref 82–98)
MONOCYTES # BLD AUTO: 0.6 K/UL (ref 0.3–1)
MONOCYTES NFR BLD: 8.3 % (ref 4–15)
NEUTROPHILS # BLD AUTO: 4.4 K/UL (ref 1.8–7.7)
NEUTROPHILS NFR BLD: 65.8 % (ref 38–73)
NRBC BLD-RTO: 0 /100 WBC
PLATELET # BLD AUTO: 172 K/UL (ref 150–350)
PMV BLD AUTO: 10.9 FL (ref 9.2–12.9)
POTASSIUM SERPL-SCNC: 4 MMOL/L (ref 3.5–5.1)
PROT SERPL-MCNC: 8.2 G/DL (ref 6–8.4)
PROTHROMBIN TIME: 11 SEC (ref 9–12.5)
RBC # BLD AUTO: 4.26 M/UL (ref 4–5.4)
SODIUM SERPL-SCNC: 138 MMOL/L (ref 136–145)
WBC # BLD AUTO: 6.75 K/UL (ref 3.9–12.7)

## 2020-08-15 PROCEDURE — 85610 PROTHROMBIN TIME: CPT

## 2020-08-15 PROCEDURE — 80053 COMPREHEN METABOLIC PANEL: CPT

## 2020-08-15 PROCEDURE — 85730 THROMBOPLASTIN TIME PARTIAL: CPT

## 2020-08-15 PROCEDURE — 85025 COMPLETE CBC W/AUTO DIFF WBC: CPT

## 2020-08-15 PROCEDURE — 99282 EMERGENCY DEPT VISIT SF MDM: CPT
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
## 2020-08-15 NOTE — DISCHARGE INSTRUCTIONS
Continue medications as prescribed.  Please return to the emergency room for fever, rapidly spreading rash, or mass to right hip that increases in size dramatically over 24 hr, or any other new/concerning symptoms.    Thank you for allowing me to care for you today.  I hope our treatment plan will make you feel better in the next few days.  In order for me to take better care of my future patients and improve our Emergency Department, I would appreciate if you can provide us with feedback.  In the next few days, you may receive a survey in the mail.  If you do, it would mean a great deal to me if you would please take the time to complete it.    Thank you and I hope you feel better.  Prema Castrejon NP

## 2020-08-15 NOTE — ED NOTES
PRESENT TO ED DUE TO  RASH WITH ITCHING TO BLE  THAT STARTED IN July WAS SEEN BY [facility] WAS TREATED WITH PREDNISONE AND A CREAM. TREATMENT HAD SLIGHT IMPROVEMENT BUT HAS SINCE RETURNED. COMPLAIN OF RIGHT HIP PAIN.    APPEARANCE: Alert, oriented and in no acute distress.  CARDIAC: Normal rate and rhythm, no murmur heard.   PERIPHERAL VASCULAR: peripheral pulses present. Normal cap refill. No edema. Warm to touch.    RESPIRATORY:Normal rate and effort, breath sounds clear bilaterally throughout chest. Respirations are equal and unlabored no obvious signs of distress.  GASTRO: soft, bowel sounds normal, no tenderness, no abdominal distention.  MUSC: Full ROM. COMPLAIN OF RIGHT HIP PAIN. No obvious deformity.  SKIN: Skin is warm and dry, normal skin turgor, mucous membranes moist. RASH TO BLE EXTREMITIES WITH ITCHING.  NEURO: 5/5 strength major flexors/extensors bilaterally. Sensory intact to light touch bilaterally. [place] coma scale: eyes open spontaneously-4, oriented & converses-5, obeys commands-6. No neurological abnormalities.   MENTAL STATUS: awake, alert and aware of environment.  EYE: PERRL, both eyes: pupils brisk and reactive to light. Normal size.  ENT: EARS: no obvious drainage. NOSE: no active bleeding.

## 2020-08-17 ENCOUNTER — OFFICE VISIT (OUTPATIENT)
Dept: HEMATOLOGY/ONCOLOGY | Facility: CLINIC | Age: 65
End: 2020-08-17
Payer: MEDICAID

## 2020-08-17 VITALS
TEMPERATURE: 98 F | BODY MASS INDEX: 44.13 KG/M2 | WEIGHT: 274.56 LBS | RESPIRATION RATE: 18 BRPM | DIASTOLIC BLOOD PRESSURE: 77 MMHG | OXYGEN SATURATION: 98 % | HEART RATE: 99 BPM | SYSTOLIC BLOOD PRESSURE: 140 MMHG | HEIGHT: 66 IN

## 2020-08-17 DIAGNOSIS — R23.3 PETECHIAL RASH: ICD-10-CM

## 2020-08-17 DIAGNOSIS — Z71.89 ADVANCE CARE PLANNING: ICD-10-CM

## 2020-08-17 DIAGNOSIS — D69.3 IMMUNE THROMBOCYTOPENIA: Primary | ICD-10-CM

## 2020-08-17 DIAGNOSIS — E66.01 MORBID OBESITY: ICD-10-CM

## 2020-08-17 PROCEDURE — 99204 OFFICE O/P NEW MOD 45 MIN: CPT | Mod: S$PBB,,, | Performed by: INTERNAL MEDICINE

## 2020-08-17 PROCEDURE — 99999 PR PBB SHADOW E&M-EST. PATIENT-LVL III: CPT | Mod: PBBFAC,,, | Performed by: INTERNAL MEDICINE

## 2020-08-17 PROCEDURE — 99497 ADVNCD CARE PLAN 30 MIN: CPT | Mod: S$PBB,,, | Performed by: INTERNAL MEDICINE

## 2020-08-17 PROCEDURE — 99213 OFFICE O/P EST LOW 20 MIN: CPT | Mod: PBBFAC,PN | Performed by: INTERNAL MEDICINE

## 2020-08-17 PROCEDURE — 99497 ADVNCD CARE PLAN 30 MIN: CPT | Mod: PBBFAC,PN | Performed by: INTERNAL MEDICINE

## 2020-08-17 PROCEDURE — 99497 PR ADVNCD CARE PLAN 30 MIN: ICD-10-PCS | Mod: S$PBB,,, | Performed by: INTERNAL MEDICINE

## 2020-08-17 PROCEDURE — 99999 PR PBB SHADOW E&M-EST. PATIENT-LVL III: ICD-10-PCS | Mod: PBBFAC,,, | Performed by: INTERNAL MEDICINE

## 2020-08-17 PROCEDURE — 99204 PR OFFICE/OUTPT VISIT, NEW, LEVL IV, 45-59 MIN: ICD-10-PCS | Mod: S$PBB,,, | Performed by: INTERNAL MEDICINE
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
## 2020-08-27 ENCOUNTER — TELEPHONE (OUTPATIENT)
Dept: HEMATOLOGY/ONCOLOGY | Facility: CLINIC | Age: 65
End: 2020-08-27

## 2020-08-27 NOTE — TELEPHONE ENCOUNTER
I called and reviewed her labs. Platelet count was normal. ANCA was negative. She saw the dermatologist, who felt it may have been a medication-induced rash.    Return to clinic as needed.    Be Martell M.D.  Hematology/Oncology  [facility] [address], [address]  [place] [address]  Phone: [phone]  Fax: [phone]

## 2021-05-20 ENCOUNTER — HOSPITAL ENCOUNTER (INPATIENT)
Facility: HOSPITAL | Age: 66
LOS: 5 days | Discharge: HOME | DRG: 699 | End: 2021-05-25
Attending: EMERGENCY MEDICINE | Admitting: INTERNAL MEDICINE
Payer: MEDICAID

## 2021-05-20 DIAGNOSIS — R31.0 GROSS HEMATURIA: ICD-10-CM

## 2021-05-20 DIAGNOSIS — N17.9 AKI (ACUTE KIDNEY INJURY): Primary | ICD-10-CM

## 2021-05-20 DIAGNOSIS — K74.60 CIRRHOSIS: ICD-10-CM

## 2021-05-20 DIAGNOSIS — R31.9 HEMATURIA: ICD-10-CM

## 2021-05-20 DIAGNOSIS — R31.9 HEMATURIA, UNSPECIFIED TYPE: ICD-10-CM

## 2021-05-20 DIAGNOSIS — E66.01 MORBID OBESITY WITH BMI OF 40.0-44.9, ADULT: ICD-10-CM

## 2021-05-20 DIAGNOSIS — Z86.2 HISTORY OF ITP: ICD-10-CM

## 2021-05-20 PROBLEM — G47.00 INSOMNIA: Status: ACTIVE | Noted: 2021-05-20

## 2021-05-20 PROBLEM — I10 HTN (HYPERTENSION): Status: ACTIVE | Noted: 2021-05-20

## 2021-05-20 PROBLEM — D64.9 NORMOCYTIC ANEMIA: Status: ACTIVE | Noted: 2021-05-20

## 2021-05-20 LAB
ALBUMIN SERPL BCP-MCNC: 2.7 G/DL (ref 3.5–5.2)
ALP SERPL-CCNC: 73 U/L (ref 55–135)
ALT SERPL W/O P-5'-P-CCNC: 11 U/L (ref 10–44)
ANION GAP SERPL CALC-SCNC: 10 MMOL/L (ref 8–16)
AST SERPL-CCNC: 22 U/L (ref 10–40)
BACTERIA #/AREA URNS HPF: ABNORMAL /HPF
BASOPHILS # BLD AUTO: 0.02 K/UL (ref 0–0.2)
BASOPHILS NFR BLD: 0.4 % (ref 0–1.9)
BILIRUB SERPL-MCNC: 0.6 MG/DL (ref 0.1–1)
BILIRUB UR QL STRIP: NEGATIVE
BUN SERPL-MCNC: 29 MG/DL (ref 8–23)
CALCIUM SERPL-MCNC: 7.6 MG/DL (ref 8.7–10.5)
CHLORIDE SERPL-SCNC: 103 MMOL/L (ref 95–110)
CLARITY UR: ABNORMAL
CO2 SERPL-SCNC: 25 MMOL/L (ref 23–29)
COLOR UR: YELLOW
CREAT SERPL-MCNC: 3.3 MG/DL (ref 0.5–1.4)
CTP QC/QA: YES
DIFFERENTIAL METHOD: ABNORMAL
EOSINOPHIL # BLD AUTO: 0.2 K/UL (ref 0–0.5)
EOSINOPHIL NFR BLD: 3.9 % (ref 0–8)
ERYTHROCYTE [DISTWIDTH] IN BLOOD BY AUTOMATED COUNT: 12.8 % (ref 11.5–14.5)
EST. GFR  (AFRICAN AMERICAN): 16 ML/MIN/1.73 M^2
EST. GFR  (NON AFRICAN AMERICAN): 14 ML/MIN/1.73 M^2
GLUCOSE SERPL-MCNC: 129 MG/DL (ref 70–110)
GLUCOSE UR QL STRIP: NEGATIVE
HCT VFR BLD AUTO: 31.5 % (ref 37–48.5)
HGB BLD-MCNC: 10.8 G/DL (ref 12–16)
HGB UR QL STRIP: ABNORMAL
HYALINE CASTS #/AREA URNS LPF: 0 /LPF
IMM GRANULOCYTES # BLD AUTO: 0.01 K/UL (ref 0–0.04)
IMM GRANULOCYTES NFR BLD AUTO: 0.2 % (ref 0–0.5)
KETONES UR QL STRIP: NEGATIVE
LEUKOCYTE ESTERASE UR QL STRIP: NEGATIVE
LYMPHOCYTES # BLD AUTO: 1.2 K/UL (ref 1–4.8)
LYMPHOCYTES NFR BLD: 23.3 % (ref 18–48)
MCH RBC QN AUTO: 31.6 PG (ref 27–31)
MCHC RBC AUTO-ENTMCNC: 34.3 G/DL (ref 32–36)
MCV RBC AUTO: 92 FL (ref 82–98)
MICROSCOPIC COMMENT: ABNORMAL
MONOCYTES # BLD AUTO: 0.7 K/UL (ref 0.3–1)
MONOCYTES NFR BLD: 12.7 % (ref 4–15)
NEUTROPHILS # BLD AUTO: 3 K/UL (ref 1.8–7.7)
NEUTROPHILS NFR BLD: 59.5 % (ref 38–73)
NITRITE UR QL STRIP: NEGATIVE
NRBC BLD-RTO: 0 /100 WBC
PH UR STRIP: 6 [PH] (ref 5–8)
PLATELET # BLD AUTO: 147 K/UL (ref 150–450)
PLATELET BLD QL SMEAR: ABNORMAL
PMV BLD AUTO: 10.3 FL (ref 9.2–12.9)
POTASSIUM SERPL-SCNC: 4.7 MMOL/L (ref 3.5–5.1)
PROT SERPL-MCNC: 7.2 G/DL (ref 6–8.4)
PROT UR QL STRIP: ABNORMAL
RBC # BLD AUTO: 3.42 M/UL (ref 4–5.4)
RBC #/AREA URNS HPF: >100 /HPF (ref 0–4)
SARS-COV-2 RDRP RESP QL NAA+PROBE: NEGATIVE
SODIUM SERPL-SCNC: 138 MMOL/L (ref 136–145)
SP GR UR STRIP: 1.01 (ref 1–1.03)
URN SPEC COLLECT METH UR: ABNORMAL
UROBILINOGEN UR STRIP-ACNC: NEGATIVE EU/DL
WBC # BLD AUTO: 5.1 K/UL (ref 3.9–12.7)
WBC #/AREA URNS HPF: 5 /HPF (ref 0–5)

## 2021-05-20 PROCEDURE — 81000 URINALYSIS NONAUTO W/SCOPE: CPT | Performed by: PHYSICIAN ASSISTANT

## 2021-05-20 PROCEDURE — 86160 COMPLEMENT ANTIGEN: CPT | Performed by: STUDENT IN AN ORGANIZED HEALTH CARE EDUCATION/TRAINING PROGRAM

## 2021-05-20 PROCEDURE — 85610 PROTHROMBIN TIME: CPT | Performed by: STUDENT IN AN ORGANIZED HEALTH CARE EDUCATION/TRAINING PROGRAM

## 2021-05-20 PROCEDURE — 80053 COMPREHEN METABOLIC PANEL: CPT | Mod: 91 | Performed by: EMERGENCY MEDICINE

## 2021-05-20 PROCEDURE — 96360 HYDRATION IV INFUSION INIT: CPT

## 2021-05-20 PROCEDURE — 25000003 PHARM REV CODE 250: Performed by: STUDENT IN AN ORGANIZED HEALTH CARE EDUCATION/TRAINING PROGRAM

## 2021-05-20 PROCEDURE — 83540 ASSAY OF IRON: CPT | Performed by: STUDENT IN AN ORGANIZED HEALTH CARE EDUCATION/TRAINING PROGRAM

## 2021-05-20 PROCEDURE — 63600175 PHARM REV CODE 636 W HCPCS: Performed by: STUDENT IN AN ORGANIZED HEALTH CARE EDUCATION/TRAINING PROGRAM

## 2021-05-20 PROCEDURE — 85025 COMPLETE CBC W/AUTO DIFF WBC: CPT | Mod: 91 | Performed by: EMERGENCY MEDICINE

## 2021-05-20 PROCEDURE — 11000001 HC ACUTE MED/SURG PRIVATE ROOM

## 2021-05-20 PROCEDURE — 99285 EMERGENCY DEPT VISIT HI MDM: CPT | Mod: 25

## 2021-05-20 PROCEDURE — 82746 ASSAY OF FOLIC ACID SERUM: CPT | Performed by: STUDENT IN AN ORGANIZED HEALTH CARE EDUCATION/TRAINING PROGRAM

## 2021-05-20 PROCEDURE — 82607 VITAMIN B-12: CPT | Performed by: STUDENT IN AN ORGANIZED HEALTH CARE EDUCATION/TRAINING PROGRAM

## 2021-05-20 PROCEDURE — 83520 IMMUNOASSAY QUANT NOS NONAB: CPT | Performed by: STUDENT IN AN ORGANIZED HEALTH CARE EDUCATION/TRAINING PROGRAM

## 2021-05-20 PROCEDURE — 36415 COLL VENOUS BLD VENIPUNCTURE: CPT | Performed by: STUDENT IN AN ORGANIZED HEALTH CARE EDUCATION/TRAINING PROGRAM

## 2021-05-20 PROCEDURE — 86160 COMPLEMENT ANTIGEN: CPT | Mod: 59 | Performed by: STUDENT IN AN ORGANIZED HEALTH CARE EDUCATION/TRAINING PROGRAM

## 2021-05-20 PROCEDURE — 25000003 PHARM REV CODE 250: Performed by: PHYSICIAN ASSISTANT

## 2021-05-20 PROCEDURE — 84484 ASSAY OF TROPONIN QUANT: CPT | Performed by: STUDENT IN AN ORGANIZED HEALTH CARE EDUCATION/TRAINING PROGRAM

## 2021-05-20 PROCEDURE — 83036 HEMOGLOBIN GLYCOSYLATED A1C: CPT | Performed by: STUDENT IN AN ORGANIZED HEALTH CARE EDUCATION/TRAINING PROGRAM

## 2021-05-20 PROCEDURE — 86225 DNA ANTIBODY NATIVE: CPT | Performed by: STUDENT IN AN ORGANIZED HEALTH CARE EDUCATION/TRAINING PROGRAM

## 2021-05-20 PROCEDURE — 86703 HIV-1/HIV-2 1 RESULT ANTBDY: CPT | Performed by: STUDENT IN AN ORGANIZED HEALTH CARE EDUCATION/TRAINING PROGRAM

## 2021-05-20 PROCEDURE — 82728 ASSAY OF FERRITIN: CPT | Performed by: STUDENT IN AN ORGANIZED HEALTH CARE EDUCATION/TRAINING PROGRAM

## 2021-05-20 PROCEDURE — U0002 COVID-19 LAB TEST NON-CDC: HCPCS | Performed by: PHYSICIAN ASSISTANT

## 2021-05-20 PROCEDURE — 85730 THROMBOPLASTIN TIME PARTIAL: CPT | Performed by: STUDENT IN AN ORGANIZED HEALTH CARE EDUCATION/TRAINING PROGRAM

## 2021-05-20 RX ORDER — HYDROXYZINE PAMOATE 25 MG/1
25 CAPSULE ORAL NIGHTLY PRN
COMMUNITY
Start: 2020-12-15 | End: 2021-06-08

## 2021-05-20 RX ORDER — TALC
6 POWDER (GRAM) TOPICAL NIGHTLY PRN
Status: DISCONTINUED | OUTPATIENT
Start: 2021-05-20 | End: 2021-05-25 | Stop reason: HOSPADM

## 2021-05-20 RX ORDER — SODIUM CHLORIDE 0.9 % (FLUSH) 0.9 %
10 SYRINGE (ML) INJECTION
Status: DISCONTINUED | OUTPATIENT
Start: 2021-05-20 | End: 2021-05-25 | Stop reason: HOSPADM

## 2021-05-20 RX ORDER — AMLODIPINE BESYLATE 5 MG/1
10 TABLET ORAL DAILY
Status: DISCONTINUED | OUTPATIENT
Start: 2021-05-20 | End: 2021-05-25 | Stop reason: HOSPADM

## 2021-05-20 RX ORDER — SODIUM CHLORIDE 9 MG/ML
INJECTION, SOLUTION INTRAVENOUS
Status: COMPLETED | OUTPATIENT
Start: 2021-05-20 | End: 2021-05-20

## 2021-05-20 RX ADMIN — AMLODIPINE BESYLATE 10 MG: 5 TABLET ORAL at 11:05

## 2021-05-20 RX ADMIN — SODIUM CHLORIDE, SODIUM LACTATE, POTASSIUM CHLORIDE, AND CALCIUM CHLORIDE 1000 ML: .6; .31; .03; .02 INJECTION, SOLUTION INTRAVENOUS at 11:05

## 2021-05-20 RX ADMIN — SODIUM CHLORIDE: 0.9 INJECTION, SOLUTION INTRAVENOUS at 08:05

## 2021-05-20 RX ADMIN — SODIUM CHLORIDE 1000 ML: 0.9 INJECTION, SOLUTION INTRAVENOUS at 06:05

## 2021-05-21 LAB
ALBUMIN SERPL BCP-MCNC: 2.6 G/DL (ref 3.5–5.2)
ALP SERPL-CCNC: 64 U/L (ref 55–135)
ALT SERPL W/O P-5'-P-CCNC: 11 U/L (ref 10–44)
AMPHET+METHAMPHET UR QL: NEGATIVE
ANION GAP SERPL CALC-SCNC: 6 MMOL/L (ref 8–16)
APTT BLDCRRT: 27.9 SEC (ref 21–32)
AST SERPL-CCNC: 20 U/L (ref 10–40)
BARBITURATES UR QL SCN>200 NG/ML: NEGATIVE
BASOPHILS # BLD AUTO: 0.02 K/UL (ref 0–0.2)
BASOPHILS NFR BLD: 0.4 % (ref 0–1.9)
BENZODIAZ UR QL SCN>200 NG/ML: NEGATIVE
BILIRUB SERPL-MCNC: 0.7 MG/DL (ref 0.1–1)
BUN SERPL-MCNC: 28 MG/DL (ref 8–23)
BZE UR QL SCN: NEGATIVE
C3 SERPL-MCNC: 127 MG/DL (ref 50–180)
C4 SERPL-MCNC: 17 MG/DL (ref 11–44)
CALCIUM SERPL-MCNC: 7.8 MG/DL (ref 8.7–10.5)
CANNABINOIDS UR QL SCN: NEGATIVE
CHLORIDE SERPL-SCNC: 107 MMOL/L (ref 95–110)
CO2 SERPL-SCNC: 26 MMOL/L (ref 23–29)
CREAT SERPL-MCNC: 3.2 MG/DL (ref 0.5–1.4)
CREAT UR-MCNC: 28.8 MG/DL (ref 15–325)
CREAT UR-MCNC: 28.8 MG/DL (ref 15–325)
CREAT UR-MCNC: 66.5 MG/DL (ref 15–325)
DIFFERENTIAL METHOD: ABNORMAL
DSDNA AB SER-ACNC: NORMAL [IU]/ML
EOSINOPHIL # BLD AUTO: 0.2 K/UL (ref 0–0.5)
EOSINOPHIL NFR BLD: 4 % (ref 0–8)
ERYTHROCYTE [DISTWIDTH] IN BLOOD BY AUTOMATED COUNT: 12.8 % (ref 11.5–14.5)
EST. GFR  (AFRICAN AMERICAN): 17 ML/MIN/1.73 M^2
EST. GFR  (NON AFRICAN AMERICAN): 15 ML/MIN/1.73 M^2
ESTIMATED AVG GLUCOSE: 131 MG/DL (ref 68–131)
FERRITIN SERPL-MCNC: 344 NG/ML (ref 20–300)
FOLATE SERPL-MCNC: 6.6 NG/ML (ref 4–24)
GLUCOSE SERPL-MCNC: 117 MG/DL (ref 70–110)
HAV IGM SERPL QL IA: NEGATIVE
HBA1C MFR BLD: 6.2 % (ref 4–5.6)
HBV CORE IGM SERPL QL IA: NEGATIVE
HBV SURFACE AG SERPL QL IA: NEGATIVE
HCT VFR BLD AUTO: 31.9 % (ref 37–48.5)
HCV AB SERPL QL IA: NEGATIVE
HGB BLD-MCNC: 10.8 G/DL (ref 12–16)
HIV 1+2 AB+HIV1 P24 AG SERPL QL IA: NEGATIVE
IMM GRANULOCYTES # BLD AUTO: 0.01 K/UL (ref 0–0.04)
IMM GRANULOCYTES NFR BLD AUTO: 0.2 % (ref 0–0.5)
INR PPP: 1.1 (ref 0.8–1.2)
IRON SERPL-MCNC: 63 UG/DL (ref 30–160)
LYMPHOCYTES # BLD AUTO: 1.2 K/UL (ref 1–4.8)
LYMPHOCYTES NFR BLD: 25.4 % (ref 18–48)
MCH RBC QN AUTO: 31.6 PG (ref 27–31)
MCHC RBC AUTO-ENTMCNC: 33.9 G/DL (ref 32–36)
MCV RBC AUTO: 93 FL (ref 82–98)
METHADONE UR QL SCN>300 NG/ML: NEGATIVE
MONOCYTES # BLD AUTO: 0.7 K/UL (ref 0.3–1)
MONOCYTES NFR BLD: 14.1 % (ref 4–15)
NEUTROPHILS # BLD AUTO: 2.7 K/UL (ref 1.8–7.7)
NEUTROPHILS NFR BLD: 55.9 % (ref 38–73)
NRBC BLD-RTO: 0 /100 WBC
OPIATES UR QL SCN: NEGATIVE
PCP UR QL SCN>25 NG/ML: NEGATIVE
PLATELET # BLD AUTO: 134 K/UL (ref 150–450)
PLATELET BLD QL SMEAR: ABNORMAL
PMV BLD AUTO: 10.6 FL (ref 9.2–12.9)
POTASSIUM SERPL-SCNC: 4.6 MMOL/L (ref 3.5–5.1)
PROT SERPL-MCNC: 6.7 G/DL (ref 6–8.4)
PROT UR-MCNC: 105 MG/DL (ref 0–15)
PROT UR-MCNC: 200 MG/DL (ref 0–15)
PROT/CREAT UR: 3.65 MG/G{CREAT} (ref 0–0.2)
PROTHROMBIN TIME: 11.2 SEC (ref 9–12.5)
RBC # BLD AUTO: 3.42 M/UL (ref 4–5.4)
RHEUMATOID FACT SERPL-ACNC: 11 IU/ML (ref 0–15)
SATURATED IRON: 23 % (ref 20–50)
SODIUM SERPL-SCNC: 139 MMOL/L (ref 136–145)
SODIUM UR-SCNC: 49 MMOL/L (ref 20–250)
TOTAL IRON BINDING CAPACITY: 271 UG/DL (ref 250–450)
TOXICOLOGY INFORMATION: NORMAL
TRANSFERRIN SERPL-MCNC: 183 MG/DL (ref 200–375)
TROPONIN I SERPL DL<=0.01 NG/ML-MCNC: <0.006 NG/ML (ref 0–0.03)
VIT B12 SERPL-MCNC: 406 PG/ML (ref 210–950)
WBC # BLD AUTO: 4.81 K/UL (ref 3.9–12.7)

## 2021-05-21 PROCEDURE — 11000001 HC ACUTE MED/SURG PRIVATE ROOM

## 2021-05-21 PROCEDURE — 86335 PATHOLOGIST INTERPRETATION UIFE: ICD-10-PCS | Mod: 26,,, | Performed by: PATHOLOGY

## 2021-05-21 PROCEDURE — 25000003 PHARM REV CODE 250: Performed by: STUDENT IN AN ORGANIZED HEALTH CARE EDUCATION/TRAINING PROGRAM

## 2021-05-21 PROCEDURE — 80074 ACUTE HEPATITIS PANEL: CPT | Performed by: STUDENT IN AN ORGANIZED HEALTH CARE EDUCATION/TRAINING PROGRAM

## 2021-05-21 PROCEDURE — 86335 IMMUNFIX E-PHORSIS/URINE/CSF: CPT | Performed by: STUDENT IN AN ORGANIZED HEALTH CARE EDUCATION/TRAINING PROGRAM

## 2021-05-21 PROCEDURE — 86334 IMMUNOFIX E-PHORESIS SERUM: CPT | Performed by: STUDENT IN AN ORGANIZED HEALTH CARE EDUCATION/TRAINING PROGRAM

## 2021-05-21 PROCEDURE — 36415 COLL VENOUS BLD VENIPUNCTURE: CPT | Performed by: STUDENT IN AN ORGANIZED HEALTH CARE EDUCATION/TRAINING PROGRAM

## 2021-05-21 PROCEDURE — 84156 ASSAY OF PROTEIN URINE: CPT | Mod: 91 | Performed by: STUDENT IN AN ORGANIZED HEALTH CARE EDUCATION/TRAINING PROGRAM

## 2021-05-21 PROCEDURE — 84165 PATHOLOGIST INTERPRETATION SPE: ICD-10-PCS | Mod: 26,,, | Performed by: PATHOLOGY

## 2021-05-21 PROCEDURE — 25000003 PHARM REV CODE 250: Performed by: RADIOLOGY

## 2021-05-21 PROCEDURE — 93010 ELECTROCARDIOGRAM REPORT: CPT | Mod: ,,, | Performed by: INTERNAL MEDICINE

## 2021-05-21 PROCEDURE — 93005 ELECTROCARDIOGRAM TRACING: CPT

## 2021-05-21 PROCEDURE — 84300 ASSAY OF URINE SODIUM: CPT | Performed by: STUDENT IN AN ORGANIZED HEALTH CARE EDUCATION/TRAINING PROGRAM

## 2021-05-21 PROCEDURE — 84165 PROTEIN E-PHORESIS SERUM: CPT | Mod: 26,,, | Performed by: PATHOLOGY

## 2021-05-21 PROCEDURE — 86780 TREPONEMA PALLIDUM: CPT | Performed by: STUDENT IN AN ORGANIZED HEALTH CARE EDUCATION/TRAINING PROGRAM

## 2021-05-21 PROCEDURE — 84156 ASSAY OF PROTEIN URINE: CPT | Performed by: STUDENT IN AN ORGANIZED HEALTH CARE EDUCATION/TRAINING PROGRAM

## 2021-05-21 PROCEDURE — 86431 RHEUMATOID FACTOR QUANT: CPT | Performed by: STUDENT IN AN ORGANIZED HEALTH CARE EDUCATION/TRAINING PROGRAM

## 2021-05-21 PROCEDURE — 86334 IMMUNOFIX E-PHORESIS SERUM: CPT | Mod: 26,,, | Performed by: PATHOLOGY

## 2021-05-21 PROCEDURE — 83520 IMMUNOASSAY QUANT NOS NONAB: CPT | Performed by: STUDENT IN AN ORGANIZED HEALTH CARE EDUCATION/TRAINING PROGRAM

## 2021-05-21 PROCEDURE — 85025 COMPLETE CBC W/AUTO DIFF WBC: CPT | Performed by: STUDENT IN AN ORGANIZED HEALTH CARE EDUCATION/TRAINING PROGRAM

## 2021-05-21 PROCEDURE — 84165 PROTEIN E-PHORESIS SERUM: CPT | Performed by: STUDENT IN AN ORGANIZED HEALTH CARE EDUCATION/TRAINING PROGRAM

## 2021-05-21 PROCEDURE — 86334 PATHOLOGIST INTERPRETATION IFE: ICD-10-PCS | Mod: 26,,, | Performed by: PATHOLOGY

## 2021-05-21 PROCEDURE — 86335 IMMUNFIX E-PHORSIS/URINE/CSF: CPT | Mod: 26,,, | Performed by: PATHOLOGY

## 2021-05-21 PROCEDURE — 80053 COMPREHEN METABOLIC PANEL: CPT | Performed by: STUDENT IN AN ORGANIZED HEALTH CARE EDUCATION/TRAINING PROGRAM

## 2021-05-21 PROCEDURE — 63600175 PHARM REV CODE 636 W HCPCS: Performed by: RADIOLOGY

## 2021-05-21 PROCEDURE — 80307 DRUG TEST PRSMV CHEM ANLYZR: CPT | Performed by: STUDENT IN AN ORGANIZED HEALTH CARE EDUCATION/TRAINING PROGRAM

## 2021-05-21 PROCEDURE — 93010 EKG 12-LEAD: ICD-10-PCS | Mod: ,,, | Performed by: INTERNAL MEDICINE

## 2021-05-21 RX ORDER — FENTANYL CITRATE 50 UG/ML
INJECTION, SOLUTION INTRAMUSCULAR; INTRAVENOUS CODE/TRAUMA/SEDATION MEDICATION
Status: COMPLETED | OUTPATIENT
Start: 2021-05-21 | End: 2021-05-21

## 2021-05-21 RX ORDER — AMITRIPTYLINE HYDROCHLORIDE 25 MG/1
50 TABLET, FILM COATED ORAL NIGHTLY
Status: DISCONTINUED | OUTPATIENT
Start: 2021-05-21 | End: 2021-05-25 | Stop reason: HOSPADM

## 2021-05-21 RX ORDER — LIDOCAINE HYDROCHLORIDE 10 MG/ML
INJECTION INFILTRATION; PERINEURAL CODE/TRAUMA/SEDATION MEDICATION
Status: COMPLETED | OUTPATIENT
Start: 2021-05-21 | End: 2021-05-21

## 2021-05-21 RX ORDER — METOPROLOL SUCCINATE 50 MG/1
50 TABLET, EXTENDED RELEASE ORAL DAILY
Status: DISCONTINUED | OUTPATIENT
Start: 2021-05-21 | End: 2021-05-25 | Stop reason: HOSPADM

## 2021-05-21 RX ADMIN — FENTANYL CITRATE 25 MCG: 50 INJECTION INTRAMUSCULAR; INTRAVENOUS at 01:05

## 2021-05-21 RX ADMIN — AMLODIPINE BESYLATE 10 MG: 5 TABLET ORAL at 08:05

## 2021-05-21 RX ADMIN — METOPROLOL SUCCINATE 50 MG: 50 TABLET, EXTENDED RELEASE ORAL at 04:05

## 2021-05-21 RX ADMIN — AMITRIPTYLINE HYDROCHLORIDE 50 MG: 25 TABLET, FILM COATED ORAL at 08:05

## 2021-05-21 RX ADMIN — LIDOCAINE HYDROCHLORIDE 5 ML: 10 INJECTION, SOLUTION INFILTRATION; PERINEURAL at 01:05

## 2021-05-22 LAB
ALBUMIN SERPL BCP-MCNC: 2.5 G/DL (ref 3.5–5.2)
ALP SERPL-CCNC: 68 U/L (ref 55–135)
ALT SERPL W/O P-5'-P-CCNC: 11 U/L (ref 10–44)
ANION GAP SERPL CALC-SCNC: 9 MMOL/L (ref 8–16)
AST SERPL-CCNC: 18 U/L (ref 10–40)
BASOPHILS # BLD AUTO: 0.04 K/UL (ref 0–0.2)
BASOPHILS NFR BLD: 0.8 % (ref 0–1.9)
BILIRUB SERPL-MCNC: 0.6 MG/DL (ref 0.1–1)
BM IGG SER-ACNC: <0.2 U
BUN SERPL-MCNC: 30 MG/DL (ref 8–23)
CALCIUM SERPL-MCNC: 7.7 MG/DL (ref 8.7–10.5)
CHLORIDE SERPL-SCNC: 106 MMOL/L (ref 95–110)
CO2 SERPL-SCNC: 23 MMOL/L (ref 23–29)
CREAT SERPL-MCNC: 3.3 MG/DL (ref 0.5–1.4)
DIFFERENTIAL METHOD: ABNORMAL
EOSINOPHIL # BLD AUTO: 0.2 K/UL (ref 0–0.5)
EOSINOPHIL NFR BLD: 4.5 % (ref 0–8)
ERYTHROCYTE [DISTWIDTH] IN BLOOD BY AUTOMATED COUNT: 12.8 % (ref 11.5–14.5)
EST. GFR  (AFRICAN AMERICAN): 16 ML/MIN/1.73 M^2
EST. GFR  (NON AFRICAN AMERICAN): 14 ML/MIN/1.73 M^2
GLUCOSE SERPL-MCNC: 121 MG/DL (ref 70–110)
HCT VFR BLD AUTO: 29.8 % (ref 37–48.5)
HGB BLD-MCNC: 10.1 G/DL (ref 12–16)
IMM GRANULOCYTES # BLD AUTO: 0.01 K/UL (ref 0–0.04)
IMM GRANULOCYTES NFR BLD AUTO: 0.2 % (ref 0–0.5)
LYMPHOCYTES # BLD AUTO: 1.3 K/UL (ref 1–4.8)
LYMPHOCYTES NFR BLD: 25.8 % (ref 18–48)
MCH RBC QN AUTO: 31.8 PG (ref 27–31)
MCHC RBC AUTO-ENTMCNC: 33.9 G/DL (ref 32–36)
MCV RBC AUTO: 94 FL (ref 82–98)
MONOCYTES # BLD AUTO: 0.7 K/UL (ref 0.3–1)
MONOCYTES NFR BLD: 14 % (ref 4–15)
NEUTROPHILS # BLD AUTO: 2.8 K/UL (ref 1.8–7.7)
NEUTROPHILS NFR BLD: 54.7 % (ref 38–73)
NRBC BLD-RTO: 0 /100 WBC
PLATELET # BLD AUTO: 150 K/UL (ref 150–450)
PMV BLD AUTO: 10.7 FL (ref 9.2–12.9)
POTASSIUM SERPL-SCNC: 4.7 MMOL/L (ref 3.5–5.1)
PROT SERPL-MCNC: 6.5 G/DL (ref 6–8.4)
RBC # BLD AUTO: 3.18 M/UL (ref 4–5.4)
SODIUM SERPL-SCNC: 138 MMOL/L (ref 136–145)
WBC # BLD AUTO: 5.08 K/UL (ref 3.9–12.7)

## 2021-05-22 PROCEDURE — 25000003 PHARM REV CODE 250: Performed by: STUDENT IN AN ORGANIZED HEALTH CARE EDUCATION/TRAINING PROGRAM

## 2021-05-22 PROCEDURE — 36415 COLL VENOUS BLD VENIPUNCTURE: CPT | Performed by: STUDENT IN AN ORGANIZED HEALTH CARE EDUCATION/TRAINING PROGRAM

## 2021-05-22 PROCEDURE — 85025 COMPLETE CBC W/AUTO DIFF WBC: CPT | Performed by: STUDENT IN AN ORGANIZED HEALTH CARE EDUCATION/TRAINING PROGRAM

## 2021-05-22 PROCEDURE — 80053 COMPREHEN METABOLIC PANEL: CPT | Performed by: STUDENT IN AN ORGANIZED HEALTH CARE EDUCATION/TRAINING PROGRAM

## 2021-05-22 PROCEDURE — 11000001 HC ACUTE MED/SURG PRIVATE ROOM

## 2021-05-22 RX ADMIN — AMITRIPTYLINE HYDROCHLORIDE 50 MG: 25 TABLET, FILM COATED ORAL at 09:05

## 2021-05-22 RX ADMIN — METOPROLOL SUCCINATE 50 MG: 50 TABLET, EXTENDED RELEASE ORAL at 09:05

## 2021-05-22 RX ADMIN — AMLODIPINE BESYLATE 10 MG: 5 TABLET ORAL at 09:05

## 2021-05-23 LAB
ALBUMIN SERPL BCP-MCNC: 2.7 G/DL (ref 3.5–5.2)
ALP SERPL-CCNC: 74 U/L (ref 55–135)
ALT SERPL W/O P-5'-P-CCNC: 10 U/L (ref 10–44)
ANION GAP SERPL CALC-SCNC: 10 MMOL/L (ref 8–16)
AST SERPL-CCNC: 20 U/L (ref 10–40)
BASOPHILS # BLD AUTO: 0.04 K/UL (ref 0–0.2)
BASOPHILS NFR BLD: 0.8 % (ref 0–1.9)
BILIRUB SERPL-MCNC: 0.5 MG/DL (ref 0.1–1)
BUN SERPL-MCNC: 33 MG/DL (ref 8–23)
CALCIUM SERPL-MCNC: 7.9 MG/DL (ref 8.7–10.5)
CHLORIDE SERPL-SCNC: 103 MMOL/L (ref 95–110)
CO2 SERPL-SCNC: 25 MMOL/L (ref 23–29)
CREAT SERPL-MCNC: 3.2 MG/DL (ref 0.5–1.4)
DIFFERENTIAL METHOD: ABNORMAL
EOSINOPHIL # BLD AUTO: 0.2 K/UL (ref 0–0.5)
EOSINOPHIL NFR BLD: 3.9 % (ref 0–8)
ERYTHROCYTE [DISTWIDTH] IN BLOOD BY AUTOMATED COUNT: 12.8 % (ref 11.5–14.5)
EST. GFR  (AFRICAN AMERICAN): 17 ML/MIN/1.73 M^2
EST. GFR  (NON AFRICAN AMERICAN): 15 ML/MIN/1.73 M^2
GLUCOSE SERPL-MCNC: 113 MG/DL (ref 70–110)
HCT VFR BLD AUTO: 31.4 % (ref 37–48.5)
HGB BLD-MCNC: 10.5 G/DL (ref 12–16)
IMM GRANULOCYTES # BLD AUTO: 0.01 K/UL (ref 0–0.04)
IMM GRANULOCYTES NFR BLD AUTO: 0.2 % (ref 0–0.5)
LYMPHOCYTES # BLD AUTO: 1.2 K/UL (ref 1–4.8)
LYMPHOCYTES NFR BLD: 24.8 % (ref 18–48)
MCH RBC QN AUTO: 31.3 PG (ref 27–31)
MCHC RBC AUTO-ENTMCNC: 33.4 G/DL (ref 32–36)
MCV RBC AUTO: 94 FL (ref 82–98)
MONOCYTES # BLD AUTO: 0.5 K/UL (ref 0.3–1)
MONOCYTES NFR BLD: 9.4 % (ref 4–15)
NEUTROPHILS # BLD AUTO: 3 K/UL (ref 1.8–7.7)
NEUTROPHILS NFR BLD: 60.9 % (ref 38–73)
NRBC BLD-RTO: 0 /100 WBC
PLATELET # BLD AUTO: 162 K/UL (ref 150–450)
PMV BLD AUTO: 10.8 FL (ref 9.2–12.9)
POTASSIUM SERPL-SCNC: 4.5 MMOL/L (ref 3.5–5.1)
PROT SERPL-MCNC: 7.1 G/DL (ref 6–8.4)
RBC # BLD AUTO: 3.36 M/UL (ref 4–5.4)
SODIUM SERPL-SCNC: 138 MMOL/L (ref 136–145)
WBC # BLD AUTO: 4.91 K/UL (ref 3.9–12.7)

## 2021-05-23 PROCEDURE — 11000001 HC ACUTE MED/SURG PRIVATE ROOM

## 2021-05-23 PROCEDURE — 85025 COMPLETE CBC W/AUTO DIFF WBC: CPT | Performed by: STUDENT IN AN ORGANIZED HEALTH CARE EDUCATION/TRAINING PROGRAM

## 2021-05-23 PROCEDURE — 25000003 PHARM REV CODE 250: Performed by: STUDENT IN AN ORGANIZED HEALTH CARE EDUCATION/TRAINING PROGRAM

## 2021-05-23 PROCEDURE — 80053 COMPREHEN METABOLIC PANEL: CPT | Performed by: STUDENT IN AN ORGANIZED HEALTH CARE EDUCATION/TRAINING PROGRAM

## 2021-05-23 PROCEDURE — 36415 COLL VENOUS BLD VENIPUNCTURE: CPT | Performed by: STUDENT IN AN ORGANIZED HEALTH CARE EDUCATION/TRAINING PROGRAM

## 2021-05-23 RX ADMIN — METOPROLOL SUCCINATE 50 MG: 50 TABLET, EXTENDED RELEASE ORAL at 08:05

## 2021-05-23 RX ADMIN — AMLODIPINE BESYLATE 10 MG: 5 TABLET ORAL at 08:05

## 2021-05-23 RX ADMIN — AMITRIPTYLINE HYDROCHLORIDE 50 MG: 25 TABLET, FILM COATED ORAL at 08:05

## 2021-05-24 LAB
ALBUMIN SERPL BCP-MCNC: 2.5 G/DL (ref 3.5–5.2)
ALBUMIN SERPL ELPH-MCNC: 2.84 G/DL (ref 3.35–5.55)
ALP SERPL-CCNC: 66 U/L (ref 55–135)
ALPHA1 GLOB SERPL ELPH-MCNC: 0.37 G/DL (ref 0.17–0.41)
ALPHA2 GLOB SERPL ELPH-MCNC: 0.8 G/DL (ref 0.43–0.99)
ALT SERPL W/O P-5'-P-CCNC: 9 U/L (ref 10–44)
ANION GAP SERPL CALC-SCNC: 6 MMOL/L (ref 8–16)
AORTIC ROOT ANNULUS: 2.76 CM
AST SERPL-CCNC: 18 U/L (ref 10–40)
AV INDEX (PROSTH): 0.56
AV MEAN GRADIENT: 5 MMHG
AV PEAK GRADIENT: 8 MMHG
AV VALVE AREA: 2.01 CM2
AV VELOCITY RATIO: 0.59
B-GLOBULIN SERPL ELPH-MCNC: 1.18 G/DL (ref 0.5–1.1)
BASOPHILS # BLD AUTO: 0.03 K/UL (ref 0–0.2)
BASOPHILS NFR BLD: 0.7 % (ref 0–1.9)
BILIRUB SERPL-MCNC: 0.6 MG/DL (ref 0.1–1)
BSA FOR ECHO PROCEDURE: 2.45 M2
BUN SERPL-MCNC: 36 MG/DL (ref 8–23)
CALCIUM SERPL-MCNC: 7.8 MG/DL (ref 8.7–10.5)
CHLORIDE SERPL-SCNC: 104 MMOL/L (ref 95–110)
CO2 SERPL-SCNC: 26 MMOL/L (ref 23–29)
CREAT SERPL-MCNC: 3.4 MG/DL (ref 0.5–1.4)
CV ECHO LV RWT: 0.61 CM
DIFFERENTIAL METHOD: ABNORMAL
DOP CALC AO PEAK VEL: 1.43 M/S
DOP CALC AO VTI: 36.13 CM
DOP CALC LVOT AREA: 3.6 CM2
DOP CALC LVOT DIAMETER: 2.13 CM
DOP CALC LVOT PEAK VEL: 0.85 M/S
DOP CALC LVOT STROKE VOLUME: 72.55 CM3
DOP CALCLVOT PEAK VEL VTI: 20.37 CM
E WAVE DECELERATION TIME: 165.91 MSEC
E/A RATIO: 1
E/E' RATIO: 12.42 M/S
ECHO LV POSTERIOR WALL: 1.26 CM (ref 0.6–1.1)
EJECTION FRACTION: 65 %
EOSINOPHIL # BLD AUTO: 0.2 K/UL (ref 0–0.5)
EOSINOPHIL NFR BLD: 3.4 % (ref 0–8)
ERYTHROCYTE [DISTWIDTH] IN BLOOD BY AUTOMATED COUNT: 12.7 % (ref 11.5–14.5)
EST. GFR  (AFRICAN AMERICAN): 16 ML/MIN/1.73 M^2
EST. GFR  (NON AFRICAN AMERICAN): 14 ML/MIN/1.73 M^2
FRACTIONAL SHORTENING: 28 % (ref 28–44)
GAMMA GLOB SERPL ELPH-MCNC: 1.6 G/DL (ref 0.67–1.58)
GLUCOSE SERPL-MCNC: 110 MG/DL (ref 70–110)
HCT VFR BLD AUTO: 29.6 % (ref 37–48.5)
HGB BLD-MCNC: 9.9 G/DL (ref 12–16)
IMM GRANULOCYTES # BLD AUTO: 0.01 K/UL (ref 0–0.04)
IMM GRANULOCYTES NFR BLD AUTO: 0.2 % (ref 0–0.5)
INTERPRETATION UR IFE-IMP: NORMAL
INTERVENTRICULAR SEPTUM: 1.3 CM (ref 0.6–1.1)
IVRT: 66.6 MSEC
KAPPA LC SER QL IA: 12.92 MG/DL (ref 0.33–1.94)
KAPPA LC/LAMBDA SER IA: 1.27 (ref 0.26–1.65)
LA MAJOR: 6.39 CM
LA MINOR: 5.35 CM
LA WIDTH: 3.53 CM
LAMBDA LC SER QL IA: 10.19 MG/DL (ref 0.57–2.63)
LEFT ATRIUM SIZE: 3.41 CM
LEFT ATRIUM VOLUME INDEX MOD: 21.8 ML/M2
LEFT ATRIUM VOLUME INDEX: 25.7 ML/M2
LEFT ATRIUM VOLUME MOD: 50.53 CM3
LEFT ATRIUM VOLUME: 59.59 CM3
LEFT INTERNAL DIMENSION IN SYSTOLE: 2.98 CM (ref 2.1–4)
LEFT VENTRICLE DIASTOLIC VOLUME INDEX: 32.51 ML/M2
LEFT VENTRICLE DIASTOLIC VOLUME: 75.42 ML
LEFT VENTRICLE MASS INDEX: 82 G/M2
LEFT VENTRICLE SYSTOLIC VOLUME INDEX: 14.8 ML/M2
LEFT VENTRICLE SYSTOLIC VOLUME: 34.32 ML
LEFT VENTRICULAR INTERNAL DIMENSION IN DIASTOLE: 4.13 CM (ref 3.5–6)
LEFT VENTRICULAR MASS: 191.11 G
LV LATERAL E/E' RATIO: 14.75 M/S
LV SEPTAL E/E' RATIO: 10.73 M/S
LYMPHOCYTES # BLD AUTO: 1 K/UL (ref 1–4.8)
LYMPHOCYTES NFR BLD: 22.8 % (ref 18–48)
MCH RBC QN AUTO: 30.8 PG (ref 27–31)
MCHC RBC AUTO-ENTMCNC: 33.4 G/DL (ref 32–36)
MCV RBC AUTO: 92 FL (ref 82–98)
MONOCYTES # BLD AUTO: 0.5 K/UL (ref 0.3–1)
MONOCYTES NFR BLD: 10.5 % (ref 4–15)
MV A" WAVE DURATION": 10.28 MSEC
MV MEAN GRADIENT: 1 MMHG
MV PEAK A VEL: 1.18 M/S
MV PEAK E VEL: 1.18 M/S
MV PEAK GRADIENT: 5 MMHG
MV STENOSIS PRESSURE HALF TIME: 47.76 MS
MV VALVE AREA P 1/2 METHOD: 4.61 CM2
NEUTROPHILS # BLD AUTO: 2.7 K/UL (ref 1.8–7.7)
NEUTROPHILS NFR BLD: 62.4 % (ref 38–73)
NRBC BLD-RTO: 0 /100 WBC
PISA MRMAX VEL: 0.04 M/S
PLATELET # BLD AUTO: 152 K/UL (ref 150–450)
PMV BLD AUTO: 10.4 FL (ref 9.2–12.9)
POTASSIUM SERPL-SCNC: 4.6 MMOL/L (ref 3.5–5.1)
PROT SERPL-MCNC: 6.6 G/DL (ref 6–8.4)
PROT SERPL-MCNC: 6.8 G/DL (ref 6–8.4)
PULM VEIN S/D RATIO: 1.26
PV PEAK D VEL: 0.61 M/S
PV PEAK S VEL: 0.77 M/S
RA MAJOR: 4.86 CM
RA PRESSURE: 8 MMHG
RA WIDTH: 2.84 CM
RBC # BLD AUTO: 3.21 M/UL (ref 4–5.4)
RIGHT VENTRICULAR END-DIASTOLIC DIMENSION: 1.8 CM
RV TISSUE DOPPLER FREE WALL SYSTOLIC VELOCITY 1 (APICAL 4 CHAMBER VIEW): 11.44 CM/S
SODIUM SERPL-SCNC: 136 MMOL/L (ref 136–145)
TDI LATERAL: 0.08 M/S
TDI SEPTAL: 0.11 M/S
TDI: 0.1 M/S
WBC # BLD AUTO: 4.39 K/UL (ref 3.9–12.7)

## 2021-05-24 PROCEDURE — 36415 COLL VENOUS BLD VENIPUNCTURE: CPT | Performed by: STUDENT IN AN ORGANIZED HEALTH CARE EDUCATION/TRAINING PROGRAM

## 2021-05-24 PROCEDURE — 25000003 PHARM REV CODE 250: Performed by: STUDENT IN AN ORGANIZED HEALTH CARE EDUCATION/TRAINING PROGRAM

## 2021-05-24 PROCEDURE — 80053 COMPREHEN METABOLIC PANEL: CPT | Performed by: STUDENT IN AN ORGANIZED HEALTH CARE EDUCATION/TRAINING PROGRAM

## 2021-05-24 PROCEDURE — 11000001 HC ACUTE MED/SURG PRIVATE ROOM

## 2021-05-24 PROCEDURE — 85025 COMPLETE CBC W/AUTO DIFF WBC: CPT | Performed by: STUDENT IN AN ORGANIZED HEALTH CARE EDUCATION/TRAINING PROGRAM

## 2021-05-24 RX ADMIN — AMLODIPINE BESYLATE 10 MG: 5 TABLET ORAL at 08:05

## 2021-05-24 RX ADMIN — AMITRIPTYLINE HYDROCHLORIDE 50 MG: 25 TABLET, FILM COATED ORAL at 09:05

## 2021-05-24 RX ADMIN — METOPROLOL SUCCINATE 50 MG: 50 TABLET, EXTENDED RELEASE ORAL at 08:05

## 2021-05-25 VITALS
DIASTOLIC BLOOD PRESSURE: 61 MMHG | HEIGHT: 66 IN | BODY MASS INDEX: 45.64 KG/M2 | RESPIRATION RATE: 18 BRPM | HEART RATE: 84 BPM | TEMPERATURE: 98 F | WEIGHT: 284 LBS | OXYGEN SATURATION: 95 % | SYSTOLIC BLOOD PRESSURE: 130 MMHG

## 2021-05-25 LAB
ALBUMIN SERPL BCP-MCNC: 2.7 G/DL (ref 3.5–5.2)
ALP SERPL-CCNC: 69 U/L (ref 55–135)
ALT SERPL W/O P-5'-P-CCNC: 12 U/L (ref 10–44)
ANION GAP SERPL CALC-SCNC: 9 MMOL/L (ref 8–16)
AST SERPL-CCNC: 19 U/L (ref 10–40)
BASOPHILS # BLD AUTO: 0.02 K/UL (ref 0–0.2)
BASOPHILS NFR BLD: 0.5 % (ref 0–1.9)
BILIRUB SERPL-MCNC: 0.4 MG/DL (ref 0.1–1)
BUN SERPL-MCNC: 39 MG/DL (ref 8–23)
CALCIUM SERPL-MCNC: 8.1 MG/DL (ref 8.7–10.5)
CHLORIDE SERPL-SCNC: 104 MMOL/L (ref 95–110)
CHOLEST SERPL-MCNC: 130 MG/DL (ref 120–199)
CHOLEST/HDLC SERPL: 4.2 {RATIO} (ref 2–5)
CO2 SERPL-SCNC: 23 MMOL/L (ref 23–29)
CREAT SERPL-MCNC: 3.1 MG/DL (ref 0.5–1.4)
DIFFERENTIAL METHOD: ABNORMAL
EOSINOPHIL # BLD AUTO: 0.1 K/UL (ref 0–0.5)
EOSINOPHIL NFR BLD: 2.1 % (ref 0–8)
ERYTHROCYTE [DISTWIDTH] IN BLOOD BY AUTOMATED COUNT: 12.6 % (ref 11.5–14.5)
EST. GFR  (AFRICAN AMERICAN): 18 ML/MIN/1.73 M^2
EST. GFR  (NON AFRICAN AMERICAN): 15 ML/MIN/1.73 M^2
GLUCOSE SERPL-MCNC: 113 MG/DL (ref 70–110)
HCT VFR BLD AUTO: 29.9 % (ref 37–48.5)
HDLC SERPL-MCNC: 31 MG/DL (ref 40–75)
HDLC SERPL: 23.8 % (ref 20–50)
HGB BLD-MCNC: 10.2 G/DL (ref 12–16)
IMM GRANULOCYTES # BLD AUTO: 0.02 K/UL (ref 0–0.04)
IMM GRANULOCYTES NFR BLD AUTO: 0.5 % (ref 0–0.5)
LDLC SERPL CALC-MCNC: 70 MG/DL (ref 63–159)
LYMPHOCYTES # BLD AUTO: 1 K/UL (ref 1–4.8)
LYMPHOCYTES NFR BLD: 21.9 % (ref 18–48)
MCH RBC QN AUTO: 31.6 PG (ref 27–31)
MCHC RBC AUTO-ENTMCNC: 34.1 G/DL (ref 32–36)
MCV RBC AUTO: 93 FL (ref 82–98)
MONOCYTES # BLD AUTO: 0.4 K/UL (ref 0.3–1)
MONOCYTES NFR BLD: 9.9 % (ref 4–15)
NEUTROPHILS # BLD AUTO: 2.8 K/UL (ref 1.8–7.7)
NEUTROPHILS NFR BLD: 65.1 % (ref 38–73)
NONHDLC SERPL-MCNC: 99 MG/DL
NRBC BLD-RTO: 0 /100 WBC
PATHOLOGIST INTERPRETATION SPE: NORMAL
PLATELET # BLD AUTO: 147 K/UL (ref 150–450)
PMV BLD AUTO: 10.6 FL (ref 9.2–12.9)
POTASSIUM SERPL-SCNC: 4.8 MMOL/L (ref 3.5–5.1)
PROT SERPL-MCNC: 6.9 G/DL (ref 6–8.4)
RBC # BLD AUTO: 3.23 M/UL (ref 4–5.4)
SODIUM SERPL-SCNC: 136 MMOL/L (ref 136–145)
TRIGL SERPL-MCNC: 145 MG/DL (ref 30–150)
WBC # BLD AUTO: 4.33 K/UL (ref 3.9–12.7)

## 2021-05-25 PROCEDURE — 36415 COLL VENOUS BLD VENIPUNCTURE: CPT | Performed by: STUDENT IN AN ORGANIZED HEALTH CARE EDUCATION/TRAINING PROGRAM

## 2021-05-25 PROCEDURE — 25000003 PHARM REV CODE 250: Performed by: STUDENT IN AN ORGANIZED HEALTH CARE EDUCATION/TRAINING PROGRAM

## 2021-05-25 PROCEDURE — 63600175 PHARM REV CODE 636 W HCPCS: Performed by: STUDENT IN AN ORGANIZED HEALTH CARE EDUCATION/TRAINING PROGRAM

## 2021-05-25 PROCEDURE — 85025 COMPLETE CBC W/AUTO DIFF WBC: CPT | Performed by: STUDENT IN AN ORGANIZED HEALTH CARE EDUCATION/TRAINING PROGRAM

## 2021-05-25 PROCEDURE — 80061 LIPID PANEL: CPT | Performed by: STUDENT IN AN ORGANIZED HEALTH CARE EDUCATION/TRAINING PROGRAM

## 2021-05-25 PROCEDURE — 80053 COMPREHEN METABOLIC PANEL: CPT | Performed by: STUDENT IN AN ORGANIZED HEALTH CARE EDUCATION/TRAINING PROGRAM

## 2021-05-25 RX ORDER — ATORVASTATIN CALCIUM 10 MG/1
10 TABLET, FILM COATED ORAL NIGHTLY
Qty: 90 TABLET | Refills: 3 | Status: SHIPPED | OUTPATIENT
Start: 2021-05-25 | End: 2022-05-25

## 2021-05-25 RX ORDER — AMLODIPINE BESYLATE 10 MG/1
10 TABLET ORAL DAILY
Qty: 90 TABLET | Refills: 3 | Status: SHIPPED | OUTPATIENT
Start: 2021-05-25 | End: 2021-11-10

## 2021-05-25 RX ORDER — METOPROLOL SUCCINATE 50 MG/1
50 TABLET, EXTENDED RELEASE ORAL DAILY
Qty: 90 TABLET | Refills: 3 | Status: SHIPPED | OUTPATIENT
Start: 2021-05-26 | End: 2022-02-09 | Stop reason: SDUPTHER

## 2021-05-25 RX ORDER — ATORVASTATIN CALCIUM 10 MG/1
10 TABLET, FILM COATED ORAL NIGHTLY
Qty: 90 TABLET | Refills: 3 | Status: SHIPPED | OUTPATIENT
Start: 2021-05-25 | End: 2021-05-25

## 2021-05-25 RX ORDER — PREDNISONE 20 MG/1
80 TABLET ORAL DAILY
Qty: 120 TABLET | Refills: 0 | Status: SHIPPED | OUTPATIENT
Start: 2021-06-09 | End: 2021-06-08 | Stop reason: SDUPTHER

## 2021-05-25 RX ORDER — METOPROLOL SUCCINATE 50 MG/1
50 TABLET, EXTENDED RELEASE ORAL DAILY
Qty: 30 TABLET | Refills: 11 | Status: SHIPPED | OUTPATIENT
Start: 2021-05-26 | End: 2021-05-25

## 2021-05-25 RX ORDER — PREDNISONE 20 MG/1
1 TABLET ORAL DAILY
Qty: 84 TABLET | Refills: 0 | Status: SHIPPED | OUTPATIENT
Start: 2021-05-26 | End: 2021-06-08

## 2021-05-25 RX ORDER — ATORVASTATIN CALCIUM 10 MG/1
10 TABLET, FILM COATED ORAL NIGHTLY
Status: DISCONTINUED | OUTPATIENT
Start: 2021-05-25 | End: 2021-05-25 | Stop reason: HOSPADM

## 2021-05-25 RX ADMIN — METOPROLOL SUCCINATE 50 MG: 50 TABLET, EXTENDED RELEASE ORAL at 08:05

## 2021-05-25 RX ADMIN — PREDNISONE 130 MG: 10 TABLET ORAL at 08:05

## 2021-05-25 RX ADMIN — AMLODIPINE BESYLATE 10 MG: 5 TABLET ORAL at 08:05

## 2021-05-26 ENCOUNTER — PATIENT OUTREACH (OUTPATIENT)
Dept: ADMINISTRATIVE | Facility: CLINIC | Age: 66
End: 2021-05-26

## 2021-05-26 LAB — PATHOLOGIST INTERPRETATION UIFE: NORMAL

## 2021-05-26 RX ORDER — AMLODIPINE BESYLATE 10 MG/1
10 TABLET ORAL DAILY
Qty: 30 TABLET | Refills: 11 | Status: SHIPPED | OUTPATIENT
Start: 2021-05-26 | End: 2021-06-08 | Stop reason: SDUPTHER

## 2021-05-27 LAB
INTERPRETATION SERPL IFE-IMP: NORMAL
PATHOLOGIST INTERPRETATION IFE: NORMAL
T PALLIDUM AB SER QL IF: NORMAL

## 2021-05-28 ENCOUNTER — HOSPITAL ENCOUNTER (OUTPATIENT)
Dept: RADIOLOGY | Facility: HOSPITAL | Age: 66
Discharge: HOME | End: 2021-05-28
Attending: NURSE PRACTITIONER
Payer: MEDICAID

## 2021-05-28 DIAGNOSIS — R31.9 HEMATURIA: ICD-10-CM

## 2021-05-28 PROCEDURE — 76770 US EXAM ABDO BACK WALL COMP: CPT | Mod: TC,PO

## 2021-05-31 LAB
FINAL PATHOLOGIC DIAGNOSIS: NORMAL
GROSS: NORMAL
Lab: NORMAL

## 2021-06-08 ENCOUNTER — LAB VISIT (OUTPATIENT)
Dept: LAB | Facility: HOSPITAL | Age: 66
End: 2021-06-08
Attending: STUDENT IN AN ORGANIZED HEALTH CARE EDUCATION/TRAINING PROGRAM
Payer: MEDICAID

## 2021-06-08 DIAGNOSIS — N17.9 AKI (ACUTE KIDNEY INJURY): ICD-10-CM

## 2021-06-08 LAB
BACTERIA #/AREA URNS HPF: ABNORMAL /HPF
BILIRUB UR QL STRIP: NEGATIVE
CLARITY UR: CLEAR
COLOR UR: YELLOW
CREAT UR-MCNC: 92.6 MG/DL (ref 15–325)
GLUCOSE UR QL STRIP: ABNORMAL
HGB UR QL STRIP: ABNORMAL
HYALINE CASTS #/AREA URNS LPF: 1 /LPF
KETONES UR QL STRIP: NEGATIVE
LEUKOCYTE ESTERASE UR QL STRIP: ABNORMAL
MICROSCOPIC COMMENT: ABNORMAL
NITRITE UR QL STRIP: NEGATIVE
PH UR STRIP: 6 [PH] (ref 5–8)
PROT UR QL STRIP: ABNORMAL
PROT UR-MCNC: 131 MG/DL (ref 0–15)
PROT/CREAT UR: 1.41 MG/G{CREAT} (ref 0–0.2)
RBC #/AREA URNS HPF: 19 /HPF (ref 0–4)
SP GR UR STRIP: 1.02 (ref 1–1.03)
SQUAMOUS #/AREA URNS HPF: 0 /HPF
UNIDENT CRYS URNS QL MICRO: 1
URN SPEC COLLECT METH UR: ABNORMAL
UROBILINOGEN UR STRIP-ACNC: NEGATIVE EU/DL
WBC #/AREA URNS HPF: 3 /HPF (ref 0–5)

## 2021-06-08 PROCEDURE — 81000 URINALYSIS NONAUTO W/SCOPE: CPT | Performed by: STUDENT IN AN ORGANIZED HEALTH CARE EDUCATION/TRAINING PROGRAM

## 2021-06-08 PROCEDURE — 84156 ASSAY OF PROTEIN URINE: CPT | Performed by: STUDENT IN AN ORGANIZED HEALTH CARE EDUCATION/TRAINING PROGRAM

## 2021-06-15 ENCOUNTER — LAB VISIT (OUTPATIENT)
Dept: LAB | Facility: HOSPITAL | Age: 66
End: 2021-06-15
Attending: STUDENT IN AN ORGANIZED HEALTH CARE EDUCATION/TRAINING PROGRAM
Payer: MEDICAID

## 2021-06-15 DIAGNOSIS — N17.9 AKI (ACUTE KIDNEY INJURY): ICD-10-CM

## 2021-06-15 DIAGNOSIS — E87.5 HYPERKALEMIA: ICD-10-CM

## 2021-06-15 LAB
ALBUMIN SERPL BCP-MCNC: 2.9 G/DL (ref 3.5–5.2)
ANION GAP SERPL CALC-SCNC: 3 MMOL/L (ref 8–16)
CALCIUM SERPL-MCNC: 8.2 MG/DL (ref 8.7–10.5)
CHLORIDE SERPL-SCNC: 103 MMOL/L (ref 95–110)
CO2 SERPL-SCNC: 30 MMOL/L (ref 23–29)
CREAT SERPL-MCNC: 2.51 MG/DL (ref 0.5–1.4)
EST. GFR  (AFRICAN AMERICAN): 22.8 ML/MIN/1.73 M^2
EST. GFR  (NON AFRICAN AMERICAN): 19.8 ML/MIN/1.73 M^2
GLUCOSE SERPL-MCNC: 175 MG/DL (ref 70–110)
PHOSPHATE SERPL-MCNC: 4.4 MG/DL (ref 2.7–4.5)
POTASSIUM SERPL-SCNC: 4.5 MMOL/L (ref 3.5–5.1)
SODIUM SERPL-SCNC: 136 MMOL/L (ref 136–145)
UUN UR-MCNC: 51 MG/DL (ref 7–17)

## 2021-06-15 PROCEDURE — 36415 COLL VENOUS BLD VENIPUNCTURE: CPT | Mod: PO | Performed by: STUDENT IN AN ORGANIZED HEALTH CARE EDUCATION/TRAINING PROGRAM

## 2021-06-15 PROCEDURE — 80069 RENAL FUNCTION PANEL: CPT | Mod: PO | Performed by: STUDENT IN AN ORGANIZED HEALTH CARE EDUCATION/TRAINING PROGRAM

## 2021-07-07 ENCOUNTER — LAB VISIT (OUTPATIENT)
Dept: LAB | Facility: HOSPITAL | Age: 66
End: 2021-07-07
Attending: STUDENT IN AN ORGANIZED HEALTH CARE EDUCATION/TRAINING PROGRAM
Payer: MEDICAID

## 2021-07-07 DIAGNOSIS — M89.8X9 METABOLIC BONE DISEASE: ICD-10-CM

## 2021-07-07 DIAGNOSIS — N18.32 ANEMIA DUE TO STAGE 3B CHRONIC KIDNEY DISEASE: ICD-10-CM

## 2021-07-07 DIAGNOSIS — E55.9 VITAMIN D DEFICIENCY: ICD-10-CM

## 2021-07-07 DIAGNOSIS — D63.1 ANEMIA DUE TO STAGE 3B CHRONIC KIDNEY DISEASE: ICD-10-CM

## 2021-07-07 DIAGNOSIS — N18.32 STAGE 3B CHRONIC KIDNEY DISEASE: ICD-10-CM

## 2021-07-07 PROBLEM — N02.B9 IGA NEPHROPATHY: Status: ACTIVE | Noted: 2021-07-07

## 2021-07-07 PROBLEM — R80.1 PERSISTENT PROTEINURIA: Status: ACTIVE | Noted: 2021-07-07

## 2021-07-07 LAB
25(OH)D3+25(OH)D2 SERPL-MCNC: 10 NG/ML (ref 30–96)
ALBUMIN SERPL BCP-MCNC: 3.6 G/DL (ref 3.5–5.2)
ALBUMIN SERPL BCP-MCNC: 3.6 G/DL (ref 3.5–5.2)
ANION GAP SERPL CALC-SCNC: 4 MMOL/L (ref 8–16)
ANION GAP SERPL CALC-SCNC: 4 MMOL/L (ref 8–16)
BASOPHILS # BLD AUTO: 0.05 K/UL (ref 0–0.2)
BASOPHILS NFR BLD: 0.4 % (ref 0–1.9)
CALCIUM SERPL-MCNC: 8.7 MG/DL (ref 8.7–10.5)
CALCIUM SERPL-MCNC: 8.7 MG/DL (ref 8.7–10.5)
CHLORIDE SERPL-SCNC: 98 MMOL/L (ref 95–110)
CHLORIDE SERPL-SCNC: 98 MMOL/L (ref 95–110)
CO2 SERPL-SCNC: 34 MMOL/L (ref 23–29)
CO2 SERPL-SCNC: 34 MMOL/L (ref 23–29)
CREAT SERPL-MCNC: 2.49 MG/DL (ref 0.5–1.4)
CREAT SERPL-MCNC: 2.49 MG/DL (ref 0.5–1.4)
DIFFERENTIAL METHOD: ABNORMAL
EOSINOPHIL # BLD AUTO: 0 K/UL (ref 0–0.5)
EOSINOPHIL NFR BLD: 0.3 % (ref 0–8)
ERYTHROCYTE [DISTWIDTH] IN BLOOD BY AUTOMATED COUNT: 12.9 % (ref 11.5–14.5)
EST. GFR  (AFRICAN AMERICAN): 23 ML/MIN/1.73 M^2
EST. GFR  (AFRICAN AMERICAN): 23 ML/MIN/1.73 M^2
EST. GFR  (NON AFRICAN AMERICAN): 19.9 ML/MIN/1.73 M^2
EST. GFR  (NON AFRICAN AMERICAN): 19.9 ML/MIN/1.73 M^2
GLUCOSE SERPL-MCNC: 263 MG/DL (ref 70–110)
GLUCOSE SERPL-MCNC: 263 MG/DL (ref 70–110)
HCT VFR BLD AUTO: 28.5 % (ref 37–48.5)
HGB BLD-MCNC: 9.1 G/DL (ref 12–16)
IMM GRANULOCYTES # BLD AUTO: 0.38 K/UL (ref 0–0.04)
IMM GRANULOCYTES NFR BLD AUTO: 2.8 % (ref 0–0.5)
LYMPHOCYTES # BLD AUTO: 3.5 K/UL (ref 1–4.8)
LYMPHOCYTES NFR BLD: 25.9 % (ref 18–48)
MCH RBC QN AUTO: 31.3 PG (ref 27–31)
MCHC RBC AUTO-ENTMCNC: 31.9 G/DL (ref 32–36)
MCV RBC AUTO: 98 FL (ref 82–98)
MONOCYTES # BLD AUTO: 1 K/UL (ref 0.3–1)
MONOCYTES NFR BLD: 7.1 % (ref 4–15)
NEUTROPHILS # BLD AUTO: 8.7 K/UL (ref 1.8–7.7)
NEUTROPHILS NFR BLD: 63.5 % (ref 38–73)
NRBC BLD-RTO: 0 /100 WBC
PHOSPHATE SERPL-MCNC: 4.2 MG/DL (ref 2.7–4.5)
PHOSPHATE SERPL-MCNC: 4.2 MG/DL (ref 2.7–4.5)
PLATELET # BLD AUTO: 195 K/UL (ref 150–450)
PMV BLD AUTO: 10.4 FL (ref 9.2–12.9)
POTASSIUM SERPL-SCNC: 4.8 MMOL/L (ref 3.5–5.1)
POTASSIUM SERPL-SCNC: 4.8 MMOL/L (ref 3.5–5.1)
PTH-INTACT SERPL-MCNC: 331 PG/ML (ref 9–77)
RBC # BLD AUTO: 2.91 M/UL (ref 4–5.4)
SODIUM SERPL-SCNC: 136 MMOL/L (ref 136–145)
SODIUM SERPL-SCNC: 136 MMOL/L (ref 136–145)
UUN UR-MCNC: 40 MG/DL (ref 7–17)
UUN UR-MCNC: 40 MG/DL (ref 7–17)
WBC # BLD AUTO: 13.68 K/UL (ref 3.9–12.7)

## 2021-07-07 PROCEDURE — 83970 ASSAY OF PARATHORMONE: CPT | Mod: PO | Performed by: STUDENT IN AN ORGANIZED HEALTH CARE EDUCATION/TRAINING PROGRAM

## 2021-07-07 PROCEDURE — 80069 RENAL FUNCTION PANEL: CPT | Mod: PO | Performed by: STUDENT IN AN ORGANIZED HEALTH CARE EDUCATION/TRAINING PROGRAM

## 2021-07-07 PROCEDURE — 36415 COLL VENOUS BLD VENIPUNCTURE: CPT | Mod: PO | Performed by: STUDENT IN AN ORGANIZED HEALTH CARE EDUCATION/TRAINING PROGRAM

## 2021-07-07 PROCEDURE — 82306 VITAMIN D 25 HYDROXY: CPT | Mod: PO | Performed by: STUDENT IN AN ORGANIZED HEALTH CARE EDUCATION/TRAINING PROGRAM

## 2021-07-07 PROCEDURE — 85025 COMPLETE CBC W/AUTO DIFF WBC: CPT | Mod: PO | Performed by: STUDENT IN AN ORGANIZED HEALTH CARE EDUCATION/TRAINING PROGRAM

## 2021-08-03 ENCOUNTER — LAB VISIT (OUTPATIENT)
Dept: LAB | Facility: HOSPITAL | Age: 66
End: 2021-08-03
Attending: STUDENT IN AN ORGANIZED HEALTH CARE EDUCATION/TRAINING PROGRAM
Payer: MEDICAID

## 2021-08-03 DIAGNOSIS — N18.32 STAGE 3B CHRONIC KIDNEY DISEASE: ICD-10-CM

## 2021-08-03 DIAGNOSIS — N25.81 SECONDARY HYPERPARATHYROIDISM: ICD-10-CM

## 2021-08-03 LAB
ALBUMIN SERPL BCP-MCNC: 3.8 G/DL (ref 3.5–5.2)
ANION GAP SERPL CALC-SCNC: 15 MMOL/L (ref 8–16)
BASOPHILS # BLD AUTO: 0.03 K/UL (ref 0–0.2)
BASOPHILS NFR BLD: 0.1 % (ref 0–1.9)
CALCIUM SERPL-MCNC: 9 MG/DL (ref 8.7–10.5)
CHLORIDE SERPL-SCNC: 97 MMOL/L (ref 95–110)
CO2 SERPL-SCNC: 29 MMOL/L (ref 23–29)
CREAT SERPL-MCNC: 1.84 MG/DL (ref 0.5–1.4)
DIFFERENTIAL METHOD: ABNORMAL
EOSINOPHIL # BLD AUTO: 0 K/UL (ref 0–0.5)
EOSINOPHIL NFR BLD: 0 % (ref 0–8)
ERYTHROCYTE [DISTWIDTH] IN BLOOD BY AUTOMATED COUNT: 12.4 % (ref 11.5–14.5)
EST. GFR  (AFRICAN AMERICAN): 33.1 ML/MIN/1.73 M^2
EST. GFR  (NON AFRICAN AMERICAN): 28.8 ML/MIN/1.73 M^2
GLUCOSE SERPL-MCNC: 176 MG/DL (ref 70–110)
HCT VFR BLD AUTO: 30.9 % (ref 37–48.5)
HGB BLD-MCNC: 10.3 G/DL (ref 12–16)
IMM GRANULOCYTES # BLD AUTO: 0.2 K/UL (ref 0–0.04)
IMM GRANULOCYTES NFR BLD AUTO: 1 % (ref 0–0.5)
LYMPHOCYTES # BLD AUTO: 2.5 K/UL (ref 1–4.8)
LYMPHOCYTES NFR BLD: 12 % (ref 18–48)
MCH RBC QN AUTO: 31.9 PG (ref 27–31)
MCHC RBC AUTO-ENTMCNC: 33.3 G/DL (ref 32–36)
MCV RBC AUTO: 96 FL (ref 82–98)
MONOCYTES # BLD AUTO: 1 K/UL (ref 0.3–1)
MONOCYTES NFR BLD: 4.6 % (ref 4–15)
NEUTROPHILS # BLD AUTO: 17.1 K/UL (ref 1.8–7.7)
NEUTROPHILS NFR BLD: 82.3 % (ref 38–73)
NRBC BLD-RTO: 0 /100 WBC
PHOSPHATE SERPL-MCNC: 4.5 MG/DL (ref 2.7–4.5)
PLATELET # BLD AUTO: 228 K/UL (ref 150–450)
PMV BLD AUTO: 10.9 FL (ref 9.2–12.9)
POTASSIUM SERPL-SCNC: 3.7 MMOL/L (ref 3.5–5.1)
PTH-INTACT SERPL-MCNC: 271.3 PG/ML (ref 9–77)
RBC # BLD AUTO: 3.23 M/UL (ref 4–5.4)
SODIUM SERPL-SCNC: 141 MMOL/L (ref 136–145)
UUN UR-MCNC: 35 MG/DL (ref 7–17)
WBC # BLD AUTO: 20.83 K/UL (ref 3.9–12.7)

## 2021-08-03 PROCEDURE — 36415 COLL VENOUS BLD VENIPUNCTURE: CPT | Mod: PO | Performed by: STUDENT IN AN ORGANIZED HEALTH CARE EDUCATION/TRAINING PROGRAM

## 2021-08-03 PROCEDURE — 80069 RENAL FUNCTION PANEL: CPT | Mod: PO | Performed by: STUDENT IN AN ORGANIZED HEALTH CARE EDUCATION/TRAINING PROGRAM

## 2021-08-03 PROCEDURE — 85025 COMPLETE CBC W/AUTO DIFF WBC: CPT | Mod: PO | Performed by: STUDENT IN AN ORGANIZED HEALTH CARE EDUCATION/TRAINING PROGRAM

## 2021-08-03 PROCEDURE — 83970 ASSAY OF PARATHORMONE: CPT | Mod: PO | Performed by: STUDENT IN AN ORGANIZED HEALTH CARE EDUCATION/TRAINING PROGRAM

## 2021-09-14 ENCOUNTER — LAB VISIT (OUTPATIENT)
Dept: LAB | Facility: HOSPITAL | Age: 66
End: 2021-09-14
Attending: STUDENT IN AN ORGANIZED HEALTH CARE EDUCATION/TRAINING PROGRAM
Payer: MEDICAID

## 2021-09-14 DIAGNOSIS — N18.32 STAGE 3B CHRONIC KIDNEY DISEASE: ICD-10-CM

## 2021-09-14 LAB
ALBUMIN SERPL BCP-MCNC: 3.9 G/DL (ref 3.5–5.2)
ANION GAP SERPL CALC-SCNC: 12 MMOL/L (ref 8–16)
CALCIUM SERPL-MCNC: 9.5 MG/DL (ref 8.7–10.5)
CHLORIDE SERPL-SCNC: 99 MMOL/L (ref 95–110)
CO2 SERPL-SCNC: 28 MMOL/L (ref 23–29)
CREAT SERPL-MCNC: 1.71 MG/DL (ref 0.5–1.4)
EST. GFR  (AFRICAN AMERICAN): 36.2 ML/MIN/1.73 M^2
EST. GFR  (NON AFRICAN AMERICAN): 31.4 ML/MIN/1.73 M^2
GLUCOSE SERPL-MCNC: 310 MG/DL (ref 70–110)
PHOSPHATE SERPL-MCNC: 3.5 MG/DL (ref 2.7–4.5)
POTASSIUM SERPL-SCNC: 4.5 MMOL/L (ref 3.5–5.1)
SODIUM SERPL-SCNC: 139 MMOL/L (ref 136–145)
UUN UR-MCNC: 22 MG/DL (ref 7–17)

## 2021-09-14 PROCEDURE — 36415 COLL VENOUS BLD VENIPUNCTURE: CPT | Mod: PO | Performed by: STUDENT IN AN ORGANIZED HEALTH CARE EDUCATION/TRAINING PROGRAM

## 2021-09-14 PROCEDURE — 80069 RENAL FUNCTION PANEL: CPT | Mod: PO | Performed by: STUDENT IN AN ORGANIZED HEALTH CARE EDUCATION/TRAINING PROGRAM

## 2021-11-08 ENCOUNTER — LAB VISIT (OUTPATIENT)
Dept: LAB | Facility: HOSPITAL | Age: 66
End: 2021-11-08
Attending: STUDENT IN AN ORGANIZED HEALTH CARE EDUCATION/TRAINING PROGRAM
Payer: MEDICAID

## 2021-11-08 DIAGNOSIS — D63.1 ANEMIA DUE TO STAGE 3B CHRONIC KIDNEY DISEASE: ICD-10-CM

## 2021-11-08 DIAGNOSIS — N18.32 STAGE 3B CHRONIC KIDNEY DISEASE: ICD-10-CM

## 2021-11-08 DIAGNOSIS — N18.32 ANEMIA DUE TO STAGE 3B CHRONIC KIDNEY DISEASE: ICD-10-CM

## 2021-11-08 DIAGNOSIS — M89.8X9 METABOLIC BONE DISEASE: ICD-10-CM

## 2021-11-08 LAB
ALBUMIN SERPL BCP-MCNC: 3.7 G/DL (ref 3.5–5.2)
ANION GAP SERPL CALC-SCNC: 6 MMOL/L (ref 8–16)
BASOPHILS # BLD AUTO: 0.03 K/UL (ref 0–0.2)
BASOPHILS NFR BLD: 0.3 % (ref 0–1.9)
CALCIUM SERPL-MCNC: 9 MG/DL (ref 8.7–10.5)
CHLORIDE SERPL-SCNC: 100 MMOL/L (ref 95–110)
CO2 SERPL-SCNC: 31 MMOL/L (ref 23–29)
CREAT SERPL-MCNC: 1.62 MG/DL (ref 0.5–1.4)
DIFFERENTIAL METHOD: ABNORMAL
EOSINOPHIL # BLD AUTO: 0.2 K/UL (ref 0–0.5)
EOSINOPHIL NFR BLD: 2.2 % (ref 0–8)
ERYTHROCYTE [DISTWIDTH] IN BLOOD BY AUTOMATED COUNT: 12.6 % (ref 11.5–14.5)
EST. GFR  (AFRICAN AMERICAN): 38.7 ML/MIN/1.73 M^2
EST. GFR  (NON AFRICAN AMERICAN): 33.5 ML/MIN/1.73 M^2
GLUCOSE SERPL-MCNC: 263 MG/DL (ref 70–110)
HCT VFR BLD AUTO: 33.9 % (ref 37–48.5)
HGB BLD-MCNC: 10.7 G/DL (ref 12–16)
IMM GRANULOCYTES # BLD AUTO: 0.05 K/UL (ref 0–0.04)
IMM GRANULOCYTES NFR BLD AUTO: 0.5 % (ref 0–0.5)
LYMPHOCYTES # BLD AUTO: 1.6 K/UL (ref 1–4.8)
LYMPHOCYTES NFR BLD: 17 % (ref 18–48)
MCH RBC QN AUTO: 30.1 PG (ref 27–31)
MCHC RBC AUTO-ENTMCNC: 31.6 G/DL (ref 32–36)
MCV RBC AUTO: 95 FL (ref 82–98)
MONOCYTES # BLD AUTO: 0.8 K/UL (ref 0.3–1)
MONOCYTES NFR BLD: 8.2 % (ref 4–15)
NEUTROPHILS # BLD AUTO: 6.7 K/UL (ref 1.8–7.7)
NEUTROPHILS NFR BLD: 71.8 % (ref 38–73)
NRBC BLD-RTO: 0 /100 WBC
PHOSPHATE SERPL-MCNC: 3.4 MG/DL (ref 2.7–4.5)
PLATELET # BLD AUTO: 164 K/UL (ref 150–450)
PMV BLD AUTO: 10.8 FL (ref 9.2–12.9)
POTASSIUM SERPL-SCNC: 4.4 MMOL/L (ref 3.5–5.1)
PTH-INTACT SERPL-MCNC: 72.8 PG/ML (ref 9–77)
RBC # BLD AUTO: 3.56 M/UL (ref 4–5.4)
SODIUM SERPL-SCNC: 137 MMOL/L (ref 136–145)
UUN UR-MCNC: 20 MG/DL (ref 7–17)
WBC # BLD AUTO: 9.29 K/UL (ref 3.9–12.7)

## 2021-11-08 PROCEDURE — 80069 RENAL FUNCTION PANEL: CPT | Mod: PO | Performed by: STUDENT IN AN ORGANIZED HEALTH CARE EDUCATION/TRAINING PROGRAM

## 2021-11-08 PROCEDURE — 85025 COMPLETE CBC W/AUTO DIFF WBC: CPT | Mod: PO | Performed by: STUDENT IN AN ORGANIZED HEALTH CARE EDUCATION/TRAINING PROGRAM

## 2021-11-08 PROCEDURE — 83970 ASSAY OF PARATHORMONE: CPT | Mod: PO | Performed by: STUDENT IN AN ORGANIZED HEALTH CARE EDUCATION/TRAINING PROGRAM

## 2021-11-08 PROCEDURE — 36415 COLL VENOUS BLD VENIPUNCTURE: CPT | Mod: PO | Performed by: STUDENT IN AN ORGANIZED HEALTH CARE EDUCATION/TRAINING PROGRAM

## 2021-12-23 ENCOUNTER — LAB VISIT (OUTPATIENT)
Dept: LAB | Facility: HOSPITAL | Age: 66
End: 2021-12-23
Attending: STUDENT IN AN ORGANIZED HEALTH CARE EDUCATION/TRAINING PROGRAM
Payer: MEDICAID

## 2021-12-23 DIAGNOSIS — N18.32 STAGE 3B CHRONIC KIDNEY DISEASE: ICD-10-CM

## 2021-12-23 DIAGNOSIS — M89.8X9 METABOLIC BONE DISEASE: ICD-10-CM

## 2021-12-23 LAB
ALBUMIN SERPL BCP-MCNC: 4.1 G/DL (ref 3.5–5.2)
ANION GAP SERPL CALC-SCNC: 8 MMOL/L (ref 8–16)
BASOPHILS # BLD AUTO: 0.01 K/UL (ref 0–0.2)
BASOPHILS NFR BLD: 0.1 % (ref 0–1.9)
CALCIUM SERPL-MCNC: 9 MG/DL (ref 8.7–10.5)
CHLORIDE SERPL-SCNC: 103 MMOL/L (ref 95–110)
CO2 SERPL-SCNC: 28 MMOL/L (ref 23–29)
CREAT SERPL-MCNC: 1.64 MG/DL (ref 0.5–1.4)
DIFFERENTIAL METHOD: ABNORMAL
EOSINOPHIL # BLD AUTO: 0 K/UL (ref 0–0.5)
EOSINOPHIL NFR BLD: 0 % (ref 0–8)
ERYTHROCYTE [DISTWIDTH] IN BLOOD BY AUTOMATED COUNT: 12.3 % (ref 11.5–14.5)
EST. GFR  (AFRICAN AMERICAN): 37.8 ML/MIN/1.73 M^2
EST. GFR  (NON AFRICAN AMERICAN): 32.8 ML/MIN/1.73 M^2
GLUCOSE SERPL-MCNC: 246 MG/DL (ref 70–110)
HCT VFR BLD AUTO: 34.4 % (ref 37–48.5)
HGB BLD-MCNC: 11.2 G/DL (ref 12–16)
IMM GRANULOCYTES # BLD AUTO: 0.12 K/UL (ref 0–0.04)
IMM GRANULOCYTES NFR BLD AUTO: 0.8 % (ref 0–0.5)
LYMPHOCYTES # BLD AUTO: 1.5 K/UL (ref 1–4.8)
LYMPHOCYTES NFR BLD: 10.3 % (ref 18–48)
MCH RBC QN AUTO: 30.7 PG (ref 27–31)
MCHC RBC AUTO-ENTMCNC: 32.6 G/DL (ref 32–36)
MCV RBC AUTO: 94 FL (ref 82–98)
MONOCYTES # BLD AUTO: 0.6 K/UL (ref 0.3–1)
MONOCYTES NFR BLD: 4.1 % (ref 4–15)
NEUTROPHILS # BLD AUTO: 12.6 K/UL (ref 1.8–7.7)
NEUTROPHILS NFR BLD: 84.7 % (ref 38–73)
NRBC BLD-RTO: 0 /100 WBC
PHOSPHATE SERPL-MCNC: 3.8 MG/DL (ref 2.7–4.5)
PLATELET # BLD AUTO: 233 K/UL (ref 150–450)
PMV BLD AUTO: 10.8 FL (ref 9.2–12.9)
POTASSIUM SERPL-SCNC: 5 MMOL/L (ref 3.5–5.1)
PTH-INTACT SERPL-MCNC: 122.9 PG/ML (ref 9–77)
RBC # BLD AUTO: 3.65 M/UL (ref 4–5.4)
SODIUM SERPL-SCNC: 139 MMOL/L (ref 136–145)
UUN UR-MCNC: 34 MG/DL (ref 7–17)
WBC # BLD AUTO: 14.86 K/UL (ref 3.9–12.7)

## 2021-12-23 PROCEDURE — 85025 COMPLETE CBC W/AUTO DIFF WBC: CPT | Mod: PO | Performed by: STUDENT IN AN ORGANIZED HEALTH CARE EDUCATION/TRAINING PROGRAM

## 2021-12-23 PROCEDURE — 83970 ASSAY OF PARATHORMONE: CPT | Mod: PO | Performed by: STUDENT IN AN ORGANIZED HEALTH CARE EDUCATION/TRAINING PROGRAM

## 2021-12-23 PROCEDURE — 36415 COLL VENOUS BLD VENIPUNCTURE: CPT | Mod: PO | Performed by: STUDENT IN AN ORGANIZED HEALTH CARE EDUCATION/TRAINING PROGRAM

## 2021-12-23 PROCEDURE — 80069 RENAL FUNCTION PANEL: CPT | Mod: PO | Performed by: STUDENT IN AN ORGANIZED HEALTH CARE EDUCATION/TRAINING PROGRAM

## 2022-01-05 ENCOUNTER — HOSPITAL ENCOUNTER (EMERGENCY)
Facility: HOSPITAL | Age: 67
Discharge: HOME | End: 2022-01-05
Attending: EMERGENCY MEDICINE
Payer: MEDICAID

## 2022-01-05 VITALS
BODY MASS INDEX: 44.36 KG/M2 | WEIGHT: 276 LBS | RESPIRATION RATE: 20 BRPM | SYSTOLIC BLOOD PRESSURE: 103 MMHG | OXYGEN SATURATION: 98 % | DIASTOLIC BLOOD PRESSURE: 55 MMHG | HEIGHT: 66 IN | HEART RATE: 79 BPM | TEMPERATURE: 98 F

## 2022-01-05 DIAGNOSIS — R07.81 RIB PAIN ON RIGHT SIDE: ICD-10-CM

## 2022-01-05 DIAGNOSIS — S09.90XA CLOSED HEAD INJURY, INITIAL ENCOUNTER: Primary | ICD-10-CM

## 2022-01-05 DIAGNOSIS — S22.41XA CLOSED FRACTURE OF MULTIPLE RIBS OF RIGHT SIDE, INITIAL ENCOUNTER: ICD-10-CM

## 2022-01-05 PROCEDURE — 25000003 PHARM REV CODE 250: Mod: ER | Performed by: PHYSICIAN ASSISTANT

## 2022-01-05 PROCEDURE — 99284 EMERGENCY DEPT VISIT MOD MDM: CPT | Mod: 25,ER

## 2022-01-05 PROCEDURE — 94799 UNLISTED PULMONARY SVC/PX: CPT | Mod: ER

## 2022-01-05 PROCEDURE — 99900035 HC TECH TIME PER 15 MIN (STAT): Mod: ER

## 2022-01-05 RX ORDER — ACETAMINOPHEN 500 MG
1000 TABLET ORAL
Status: COMPLETED | OUTPATIENT
Start: 2022-01-05 | End: 2022-01-05

## 2022-01-05 RX ORDER — HYDROCODONE BITARTRATE AND ACETAMINOPHEN 5; 325 MG/1; MG/1
1 TABLET ORAL EVERY 6 HOURS PRN
Qty: 12 TABLET | Refills: 0 | Status: SHIPPED | OUTPATIENT
Start: 2022-01-05 | End: 2022-01-05 | Stop reason: SDUPTHER

## 2022-01-05 RX ORDER — HYDROCODONE BITARTRATE AND ACETAMINOPHEN 5; 325 MG/1; MG/1
1 TABLET ORAL EVERY 6 HOURS PRN
Qty: 12 TABLET | Refills: 0 | Status: SHIPPED | OUTPATIENT
Start: 2022-01-05 | End: 2022-01-08

## 2022-01-05 RX ORDER — OXYCODONE HYDROCHLORIDE 5 MG/1
5 TABLET ORAL
Status: COMPLETED | OUTPATIENT
Start: 2022-01-05 | End: 2022-01-05

## 2022-01-05 RX ADMIN — OXYCODONE HYDROCHLORIDE 5 MG: 5 TABLET ORAL at 12:01

## 2022-01-05 RX ADMIN — ACETAMINOPHEN 1000 MG: 500 TABLET ORAL at 11:01

## 2022-01-05 NOTE — ED PROVIDER NOTES
Encounter Date: 1/5/2022       History     Chief Complaint   Patient presents with    Fall     Pt states I fell down the steps because I missed a step. Pt states I hit my head. I saw some stars but I did not lose consciousness. Pt reports rib pain to the right side. Pt denies SOB      HPI  Review of patient's allergies indicates:  No Known Allergies  Past Medical History:   Diagnosis Date    Anemia     CKD (chronic kidney disease) stage 3, GFR 30-59 ml/min     CKD (chronic kidney disease) stage 4, GFR 15-29 ml/min     Hematuria     HTN (hypertension) 5/20/2021    Hyperkalemia     Hypocalcemia     IgA nephropathy     Proteinuria     Secondary hyperparathyroidism     Vitamin D deficiency      Past Surgical History:   Procedure Laterality Date    Evacuation of hematoma of leg Right     KNEE ARTHROPLASTY Left 3/18/2019    Procedure: ARTHROPLASTY, KNEE left;  Surgeon: Niraj Pérez MD;  Location: [facility];  Service: Orthopedics;  Laterality: Left;    TUBAL LIGATION       No family history on file.  Social History     Tobacco Use    Smoking status: Never Smoker    Smokeless tobacco: Never Used   Substance Use Topics    Alcohol use: No    Drug use: No     Review of Systems    Physical Exam     Initial Vitals   BP Pulse Resp Temp SpO2   01/05/22 1031 01/05/22 1031 01/05/22 1029 01/05/22 1031 01/05/22 1031   (!) 123/58 93 18 98.1 °F (36.7 °C) 99 %      MAP       --                Physical Exam    ED Course   Procedures  Labs Reviewed - No data to display       Imaging Results          X-Ray Ribs 2 View Right (Final result)  Result time 01/05/22 11:40:12    Final result by LAM Swan Sr., MD (01/05/22 11:40:12)                 Impression:      1. There are healing versus healed fractures in the lateral aspect of the right 4th and 5th ribs.  2. There are healed fractures on both sides of the thoracic cage.  3. There is a healed fracture in the mid diaphyseal portion of the right clavicle.  4. There  is no evidence of an acute pulmonary process.  5. There is persistent silhouetting of the inferior aspect of the left border of the heart.  This is characteristic of a pericardial fat pad.      Electronically signed by: Petr Swan MD  Date:    01/05/2022  Time:    11:40             Narrative:    EXAMINATION:  XR RIBS 2 VIEW RIGHT    CLINICAL HISTORY:  Pleurodynia    COMPARISON:  A chest x-ray performed on 05/20/2021.    FINDINGS:  There are healing versus healed fractures in the lateral aspect of the right 4th and 5th ribs.  There are healed fractures on both sides of the thoracic cage.  There is persistent silhouetting of the inferior aspect of the left border of the heart.  There is no evidence of an acute pulmonary process.  There is no pneumothorax.  The costophrenic angles are sharp.  There is a healed fracture in the mid diaphyseal portion of the right clavicle.                               CT Head Without Contrast (Final result)  Result time 01/05/22 11:22:27    Final result by Corona Du MD (01/05/22 11:22:27)                 Impression:      1.  Motion artifact is present on a number of images.  Subtle abnormalities could be overlooked.  Negative for obvious acute intracranial process. Negative for hemorrhage, or skull fracture.    2.  Age-appropriate cerebral atrophy noted.  Intracranial atherosclerotic disease noted.  Small vessel ischemic changes noted.    3.  Patchy ethmoid air cell disease.  Patchy right mastoid air cell disease.    4.  Foreign body versus dystrophic calcification within the right cheek soft tissues.  Clinical correlation is advised.    All CT scans at this facility are performed  using dose modulation techniques as appropriate to performed exam including the following:  automated exposure control; adjustment of mA and/or kV according to the patients size (this includes techniques or standardized protocols for targeted exams where dose is matched to indication/reason for  exam: i.e. extremities or head);  iterative reconstruction technique.      Electronically signed by: Corona Du MD  Date:    01/05/2022  Time:    11:22             Narrative:    EXAMINATION:  CT HEAD WITHOUT CONTRAST    CLINICAL HISTORY:  Head trauma, moderate-severe;    TECHNIQUE:  Axial images through the brain and posterior fossa were obtained without the use of IV contrast.  Sagittal and coronal reconstructions are provided for review.    COMPARISON:  No comparison studies are available.    FINDINGS:  Motion artifact is present on a number of images.  Subtle abnormalities could be overlooked.  The ventricles are midline and the CSF spaces are normal for age.  The gray-white matter junction is well preserved. Negative for intracranial vascular abnormalities. Negative for mass, mass effect, cerebral edema, hemorrhage or abnormal fluid collections.  Intracranial atherosclerotic disease noted.  Small vessel ischemic changes noted.  Falx calcifications.  Macro hyperostosis    The skull and scalp are  intact.  Patchy filling of a few right mastoid air cells noted.  Patchy ethmoid air cell disease.  The rest of the paranasal sinuses, mastoid air cells, middle ears and ear canals are clear. The globes are intact.    There is a metallic foreign body or calcification within the right cheek soft tissues.                                 Medications   acetaminophen tablet 1,000 mg (1,000 mg Oral Given 1/5/22 1124)   oxyCODONE immediate release tablet 5 mg (5 mg Oral Given 1/5/22 1245)                          Clinical Impression:   Final diagnoses:  [R07.81] Rib pain on right side  [S09.90XA] Closed head injury, initial encounter (Primary)  [S22.41XA] Closed fracture of multiple ribs of right side, initial encounter          ED Disposition Condition    Discharge Stable        ED Prescriptions     Medication Sig Dispense Start Date End Date Auth. Provider    HYDROcodone-acetaminophen (NORCO) 5-325 mg per tablet  (Status:  Discontinued) Take 1 tablet by mouth every 6 (six) hours as needed for Pain. 12 tablet 1/5/2022 1/5/2022 Genevieve Packer PA-C    HYDROcodone-acetaminophen (NORCO) 5-325 mg per tablet Take 1 tablet by mouth every 6 (six) hours as needed for Pain. 12 tablet 1/5/2022 1/8/2022 Genevieve Packer PA-C        Follow-up Information    None          Ramu Giraldo MD  01/05/22 5930

## 2022-01-06 NOTE — ED PROVIDER NOTES
Encounter Date: 1/5/2022       History     Chief Complaint   Patient presents with    Fall     Pt states I fell down the steps because I missed a step. Pt states I hit my head. I saw some stars but I did not lose consciousness. Pt reports rib pain to the right side. Pt denies SOB      Patient is a 66-year-old female who presents to ED stating that she fell down the steps just prior to arrival.  Patient reports that she was walking up the steps trying to get into her house when she missed the last step and fell.  Patient reports that the staff did not have a guard rail and fell to the side.  Approximately a 3 ft fall.  Patient reports that she did hit her head, no loss of consciousness.  Patient is complaining of right-sided rib pain.  Denies any severe headache, nausea, vomiting, visual changes, numbness/tingling, weakness, shortness of breath, abdominal pain, or any other complaints at this time.  Patient is not currently on any blood thinners.  Denies any preceding symptoms prior to the fall.        Review of patient's allergies indicates:  No Known Allergies  Past Medical History:   Diagnosis Date    Anemia     CKD (chronic kidney disease) stage 3, GFR 30-59 ml/min     CKD (chronic kidney disease) stage 4, GFR 15-29 ml/min     Hematuria     HTN (hypertension) 5/20/2021    Hyperkalemia     Hypocalcemia     IgA nephropathy     Proteinuria     Secondary hyperparathyroidism     Vitamin D deficiency      Past Surgical History:   Procedure Laterality Date    Evacuation of hematoma of leg Right     KNEE ARTHROPLASTY Left 3/18/2019    Procedure: ARTHROPLASTY, KNEE left;  Surgeon: Niraj Pérez MD;  Location: [facility];  Service: Orthopedics;  Laterality: Left;    TUBAL LIGATION       No family history on file.  Social History     Tobacco Use    Smoking status: Never Smoker    Smokeless tobacco: Never Used   Substance Use Topics    Alcohol use: No    Drug use: No     Review of Systems    Constitutional: Negative for appetite change, diaphoresis and fatigue.   HENT: Negative for facial swelling and nosebleeds.    Eyes: Negative for photophobia, pain and visual disturbance.   Respiratory: Negative for shortness of breath.    Cardiovascular: Positive for chest pain (right sided rib pain).   Gastrointestinal: Negative for abdominal pain, nausea and vomiting.   Genitourinary: Negative for decreased urine volume and difficulty urinating.   Musculoskeletal: Negative for arthralgias, back pain and myalgias.   Skin: Negative for rash.   Neurological: Negative for dizziness, tremors, seizures, syncope, facial asymmetry, speech difficulty, weakness, light-headedness, numbness and headaches.       Physical Exam     Initial Vitals   BP Pulse Resp Temp SpO2   01/05/22 1031 01/05/22 1031 01/05/22 1029 01/05/22 1031 01/05/22 1031   (!) 123/58 93 18 98.1 °F (36.7 °C) 99 %      MAP       --                Physical Exam    Nursing note and vitals reviewed.  Constitutional: She appears well-developed and well-nourished. She is not diaphoretic. No distress.   Obese   HENT:   Head: Normocephalic and atraumatic.   No scalp hematomas or lacerations   Eyes: Conjunctivae and EOM are normal. Pupils are equal, round, and reactive to light.   Neck: Neck supple.   No cervical midline tenderness or step-off   Normal range of motion.  Cardiovascular: Normal rate, regular rhythm, normal heart sounds and intact distal pulses.   Pulmonary/Chest: Breath sounds normal. No respiratory distress. She has no wheezes. She has no rhonchi. She has no rales. She exhibits tenderness (Right-sided inferolateral chest wall tenderness.  No flail chest.  No ecchymosis or abrasions noted.).   Abdominal: Abdomen is soft. Bowel sounds are normal. There is no abdominal tenderness.   Musculoskeletal:         General: No tenderness or edema. Normal range of motion.      Cervical back: Normal range of motion and neck supple.      Comments: Full range of  motion of bilateral upper and lower extremities without difficulty.     Neurological: She is alert and oriented to person, place, and time. She has normal strength. No sensory deficit. GCS score is 15. GCS eye subscore is 4. GCS verbal subscore is 5. GCS motor subscore is 6.   Skin: Skin is warm. Capillary refill takes less than 2 seconds. No rash noted.         ED Course   Procedures  Labs Reviewed - No data to display       Imaging Results          X-Ray Ribs 2 View Right (Final result)  Result time 01/05/22 11:40:12    Final result by LAM Swan Sr., MD (01/05/22 11:40:12)                 Impression:      1. There are healing versus healed fractures in the lateral aspect of the right 4th and 5th ribs.  2. There are healed fractures on both sides of the thoracic cage.  3. There is a healed fracture in the mid diaphyseal portion of the right clavicle.  4. There is no evidence of an acute pulmonary process.  5. There is persistent silhouetting of the inferior aspect of the left border of the heart.  This is characteristic of a pericardial fat pad.      Electronically signed by: Petr Swan MD  Date:    01/05/2022  Time:    11:40             Narrative:    EXAMINATION:  XR RIBS 2 VIEW RIGHT    CLINICAL HISTORY:  Pleurodynia    COMPARISON:  A chest x-ray performed on 05/20/2021.    FINDINGS:  There are healing versus healed fractures in the lateral aspect of the right 4th and 5th ribs.  There are healed fractures on both sides of the thoracic cage.  There is persistent silhouetting of the inferior aspect of the left border of the heart.  There is no evidence of an acute pulmonary process.  There is no pneumothorax.  The costophrenic angles are sharp.  There is a healed fracture in the mid diaphyseal portion of the right clavicle.                               CT Head Without Contrast (Final result)  Result time 01/05/22 11:22:27    Final result by Corona Du MD (01/05/22 11:22:27)                  Impression:      1.  Motion artifact is present on a number of images.  Subtle abnormalities could be overlooked.  Negative for obvious acute intracranial process. Negative for hemorrhage, or skull fracture.    2.  Age-appropriate cerebral atrophy noted.  Intracranial atherosclerotic disease noted.  Small vessel ischemic changes noted.    3.  Patchy ethmoid air cell disease.  Patchy right mastoid air cell disease.    4.  Foreign body versus dystrophic calcification within the right cheek soft tissues.  Clinical correlation is advised.    All CT scans at this facility are performed  using dose modulation techniques as appropriate to performed exam including the following:  automated exposure control; adjustment of mA and/or kV according to the patients size (this includes techniques or standardized protocols for targeted exams where dose is matched to indication/reason for exam: i.e. extremities or head);  iterative reconstruction technique.      Electronically signed by: Corona Du MD  Date:    01/05/2022  Time:    11:22             Narrative:    EXAMINATION:  CT HEAD WITHOUT CONTRAST    CLINICAL HISTORY:  Head trauma, moderate-severe;    TECHNIQUE:  Axial images through the brain and posterior fossa were obtained without the use of IV contrast.  Sagittal and coronal reconstructions are provided for review.    COMPARISON:  No comparison studies are available.    FINDINGS:  Motion artifact is present on a number of images.  Subtle abnormalities could be overlooked.  The ventricles are midline and the CSF spaces are normal for age.  The gray-white matter junction is well preserved. Negative for intracranial vascular abnormalities. Negative for mass, mass effect, cerebral edema, hemorrhage or abnormal fluid collections.  Intracranial atherosclerotic disease noted.  Small vessel ischemic changes noted.  Falx calcifications.  Macro hyperostosis    The skull and scalp are  intact.  Patchy filling of a few right mastoid  air cells noted.  Patchy ethmoid air cell disease.  The rest of the paranasal sinuses, mastoid air cells, middle ears and ear canals are clear. The globes are intact.    There is a metallic foreign body or calcification within the right cheek soft tissues.                                 Medications   acetaminophen tablet 1,000 mg (1,000 mg Oral Given 1/5/22 1124)   oxyCODONE immediate release tablet 5 mg (5 mg Oral Given 1/5/22 1245)     Medical Decision Making:   ED Management:  CT head negative for acute intracranial processes.  There was evidence for patchy air cell disease of the ethmoid and mastoid region.  Patient has no clinical evidence of mastoiditis.    Patient has evidence of acute fractures of the right side of 4th and 5th ribs.  Patient also has evidence that she has had healed fractures of the left side.  Patient reports that she did have a fall few weeks ago and had pain to her left side. Patient is no respiratory distress, VSS, no hypoxia. Patient informed of all incidental CT findings. Patient will be discharged to home.  Patient was provided with Rx for pain medication.  Discussed proper usage.  Advised her to follow-up with her primary care doctor for re-evaluation.  ED precautions were discussed at length the patient to return for any worsening or change in her symptoms.  Patient voiced understanding.  Case discussed with Dr. Giraldo.                      Clinical Impression:   Final diagnoses:  [R07.81] Rib pain on right side  [S09.90XA] Closed head injury, initial encounter (Primary)  [S22.41XA] Closed fracture of multiple ribs of right side, initial encounter          ED Disposition Condition    Discharge Stable        ED Prescriptions     Medication Sig Dispense Start Date End Date Auth. Provider    HYDROcodone-acetaminophen (NORCO) 5-325 mg per tablet  (Status: Discontinued) Take 1 tablet by mouth every 6 (six) hours as needed for Pain. 12 tablet 1/5/2022 1/5/2022 Genevieve Packer PA-C     HYDROcodone-acetaminophen (NORCO) 5-325 mg per tablet Take 1 tablet by mouth every 6 (six) hours as needed for Pain. 12 tablet 1/5/2022 1/8/2022 Genevieve Packer PA-C        Follow-up Information    None          Genevieve Packer PA-C  01/06/22 8008

## 2022-02-02 ENCOUNTER — LAB VISIT (OUTPATIENT)
Dept: LAB | Facility: HOSPITAL | Age: 67
End: 2022-02-02
Attending: INTERNAL MEDICINE
Payer: MEDICAID

## 2022-02-02 DIAGNOSIS — N02.B9 IGA NEPHROPATHY: ICD-10-CM

## 2022-02-02 DIAGNOSIS — D63.1 ANEMIA DUE TO STAGE 3B CHRONIC KIDNEY DISEASE: ICD-10-CM

## 2022-02-02 DIAGNOSIS — N18.32 ANEMIA DUE TO STAGE 3B CHRONIC KIDNEY DISEASE: ICD-10-CM

## 2022-02-02 LAB
ALBUMIN SERPL BCP-MCNC: 4 G/DL (ref 3.5–5.2)
ANION GAP SERPL CALC-SCNC: 11 MMOL/L (ref 8–16)
BASOPHILS # BLD AUTO: 0.05 K/UL (ref 0–0.2)
BASOPHILS NFR BLD: 0.4 % (ref 0–1.9)
CALCIUM SERPL-MCNC: 9.1 MG/DL (ref 8.7–10.5)
CHLORIDE SERPL-SCNC: 105 MMOL/L (ref 95–110)
CO2 SERPL-SCNC: 24 MMOL/L (ref 23–29)
CREAT SERPL-MCNC: 1.82 MG/DL (ref 0.5–1.4)
DIFFERENTIAL METHOD: ABNORMAL
EOSINOPHIL # BLD AUTO: 0 K/UL (ref 0–0.5)
EOSINOPHIL NFR BLD: 0.3 % (ref 0–8)
ERYTHROCYTE [DISTWIDTH] IN BLOOD BY AUTOMATED COUNT: 12.8 % (ref 11.5–14.5)
EST. GFR  (AFRICAN AMERICAN): 33.4 ML/MIN/1.73 M^2
EST. GFR  (NON AFRICAN AMERICAN): 28.9 ML/MIN/1.73 M^2
FERRITIN SERPL-MCNC: 159 NG/ML (ref 20–300)
GLUCOSE SERPL-MCNC: 149 MG/DL (ref 70–110)
HCT VFR BLD AUTO: 32.7 % (ref 37–48.5)
HGB BLD-MCNC: 10.8 G/DL (ref 12–16)
IMM GRANULOCYTES # BLD AUTO: 0.04 K/UL (ref 0–0.04)
IMM GRANULOCYTES NFR BLD AUTO: 0.4 % (ref 0–0.5)
IRON SERPL-MCNC: 113 UG/DL (ref 30–160)
LYMPHOCYTES # BLD AUTO: 2.1 K/UL (ref 1–4.8)
LYMPHOCYTES NFR BLD: 18.7 % (ref 18–48)
MCH RBC QN AUTO: 30.9 PG (ref 27–31)
MCHC RBC AUTO-ENTMCNC: 33 G/DL (ref 32–36)
MCV RBC AUTO: 93 FL (ref 82–98)
MONOCYTES # BLD AUTO: 1 K/UL (ref 0.3–1)
MONOCYTES NFR BLD: 8.5 % (ref 4–15)
NEUTROPHILS # BLD AUTO: 8.1 K/UL (ref 1.8–7.7)
NEUTROPHILS NFR BLD: 71.7 % (ref 38–73)
NRBC BLD-RTO: 0 /100 WBC
PHOSPHATE SERPL-MCNC: 4.8 MG/DL (ref 2.7–4.5)
PLATELET # BLD AUTO: 249 K/UL (ref 150–450)
PMV BLD AUTO: 10.9 FL (ref 9.2–12.9)
POTASSIUM SERPL-SCNC: 4.9 MMOL/L (ref 3.5–5.1)
PTH-INTACT SERPL-MCNC: 107.3 PG/ML (ref 9–77)
RBC # BLD AUTO: 3.5 M/UL (ref 4–5.4)
SATURATED IRON: 33 % (ref 20–50)
SODIUM SERPL-SCNC: 140 MMOL/L (ref 136–145)
TOTAL IRON BINDING CAPACITY: 342 UG/DL (ref 250–450)
TRANSFERRIN SERPL-MCNC: 231 MG/DL (ref 200–375)
URATE SERPL-MCNC: 10.1 MG/DL (ref 2.4–5.7)
UUN UR-MCNC: 27 MG/DL (ref 7–17)
WBC # BLD AUTO: 11.26 K/UL (ref 3.9–12.7)

## 2022-02-02 PROCEDURE — 84550 ASSAY OF BLOOD/URIC ACID: CPT | Performed by: INTERNAL MEDICINE

## 2022-02-02 PROCEDURE — 85025 COMPLETE CBC W/AUTO DIFF WBC: CPT | Mod: PO | Performed by: INTERNAL MEDICINE

## 2022-02-02 PROCEDURE — 84466 ASSAY OF TRANSFERRIN: CPT | Mod: PO | Performed by: INTERNAL MEDICINE

## 2022-02-02 PROCEDURE — 83970 ASSAY OF PARATHORMONE: CPT | Mod: PO | Performed by: INTERNAL MEDICINE

## 2022-02-02 PROCEDURE — 80069 RENAL FUNCTION PANEL: CPT | Mod: PO | Performed by: INTERNAL MEDICINE

## 2022-02-02 PROCEDURE — 82728 ASSAY OF FERRITIN: CPT | Performed by: INTERNAL MEDICINE

## 2022-02-02 PROCEDURE — 36415 COLL VENOUS BLD VENIPUNCTURE: CPT | Mod: PO | Performed by: INTERNAL MEDICINE

## 2022-04-01 ENCOUNTER — HOSPITAL ENCOUNTER (OUTPATIENT)
Dept: RADIOLOGY | Facility: HOSPITAL | Age: 67
Discharge: HOME | End: 2022-04-01
Attending: INTERNAL MEDICINE
Payer: MEDICAID

## 2022-04-01 DIAGNOSIS — T38.0X5A IMMUNOSUPPRESSION DUE TO CHRONIC STEROID USE: ICD-10-CM

## 2022-04-01 DIAGNOSIS — D84.821 IMMUNOSUPPRESSION DUE TO CHRONIC STEROID USE: ICD-10-CM

## 2022-04-01 DIAGNOSIS — Z79.52 IMMUNOSUPPRESSION DUE TO CHRONIC STEROID USE: ICD-10-CM

## 2022-04-01 PROCEDURE — 77080 DXA BONE DENSITY AXIAL: CPT | Mod: TC,PO

## 2022-04-06 ENCOUNTER — LAB VISIT (OUTPATIENT)
Dept: LAB | Facility: HOSPITAL | Age: 67
End: 2022-04-06
Attending: INTERNAL MEDICINE
Payer: MEDICAID

## 2022-04-06 DIAGNOSIS — E79.0 HYPERURICEMIA: ICD-10-CM

## 2022-04-06 DIAGNOSIS — R73.9 HYPERGLYCEMIA: ICD-10-CM

## 2022-04-06 DIAGNOSIS — N18.32 STAGE 3B CHRONIC KIDNEY DISEASE: ICD-10-CM

## 2022-04-06 LAB
ALBUMIN SERPL BCP-MCNC: 4.1 G/DL (ref 3.5–5.2)
ANION GAP SERPL CALC-SCNC: 10 MMOL/L (ref 8–16)
BASOPHILS # BLD AUTO: 0.09 K/UL (ref 0–0.2)
BASOPHILS NFR BLD: 1.1 % (ref 0–1.9)
CALCIUM SERPL-MCNC: 9.3 MG/DL (ref 8.7–10.5)
CHLORIDE SERPL-SCNC: 106 MMOL/L (ref 95–110)
CO2 SERPL-SCNC: 25 MMOL/L (ref 23–29)
CREAT SERPL-MCNC: 1.52 MG/DL (ref 0.5–1.4)
DIFFERENTIAL METHOD: ABNORMAL
EOSINOPHIL # BLD AUTO: 0.6 K/UL (ref 0–0.5)
EOSINOPHIL NFR BLD: 7.4 % (ref 0–8)
ERYTHROCYTE [DISTWIDTH] IN BLOOD BY AUTOMATED COUNT: 12.6 % (ref 11.5–14.5)
EST. GFR  (AFRICAN AMERICAN): 41.5 ML/MIN/1.73 M^2
EST. GFR  (NON AFRICAN AMERICAN): 36 ML/MIN/1.73 M^2
ESTIMATED AVG GLUCOSE: 160 MG/DL (ref 68–131)
ESTIMATED AVG GLUCOSE: 160 MG/DL (ref 68–131)
GLUCOSE SERPL-MCNC: 148 MG/DL (ref 70–110)
HBA1C MFR BLD: 7.2 % (ref 4–5.6)
HBA1C MFR BLD: 7.2 % (ref 4–5.6)
HCT VFR BLD AUTO: 34.5 % (ref 37–48.5)
HGB BLD-MCNC: 11.2 G/DL (ref 12–16)
IMM GRANULOCYTES # BLD AUTO: 0.03 K/UL (ref 0–0.04)
IMM GRANULOCYTES NFR BLD AUTO: 0.4 % (ref 0–0.5)
LYMPHOCYTES # BLD AUTO: 2.3 K/UL (ref 1–4.8)
LYMPHOCYTES NFR BLD: 29.6 % (ref 18–48)
MCH RBC QN AUTO: 30.4 PG (ref 27–31)
MCHC RBC AUTO-ENTMCNC: 32.5 G/DL (ref 32–36)
MCV RBC AUTO: 94 FL (ref 82–98)
MONOCYTES # BLD AUTO: 0.8 K/UL (ref 0.3–1)
MONOCYTES NFR BLD: 9.8 % (ref 4–15)
NEUTROPHILS # BLD AUTO: 4.1 K/UL (ref 1.8–7.7)
NEUTROPHILS NFR BLD: 51.7 % (ref 38–73)
NRBC BLD-RTO: 0 /100 WBC
PHOSPHATE SERPL-MCNC: 4.6 MG/DL (ref 2.7–4.5)
PLATELET # BLD AUTO: 205 K/UL (ref 150–450)
PMV BLD AUTO: 11.7 FL (ref 9.2–12.9)
POTASSIUM SERPL-SCNC: 5.3 MMOL/L (ref 3.5–5.1)
PTH-INTACT SERPL-MCNC: 82.8 PG/ML (ref 9–77)
RBC # BLD AUTO: 3.69 M/UL (ref 4–5.4)
SODIUM SERPL-SCNC: 141 MMOL/L (ref 136–145)
URATE SERPL-MCNC: 11.1 MG/DL (ref 2.4–5.7)
UUN UR-MCNC: 26 MG/DL (ref 7–17)
WBC # BLD AUTO: 7.86 K/UL (ref 3.9–12.7)

## 2022-04-06 PROCEDURE — 85025 COMPLETE CBC W/AUTO DIFF WBC: CPT | Mod: PO | Performed by: INTERNAL MEDICINE

## 2022-04-06 PROCEDURE — 80069 RENAL FUNCTION PANEL: CPT | Mod: PO | Performed by: INTERNAL MEDICINE

## 2022-04-06 PROCEDURE — 83036 HEMOGLOBIN GLYCOSYLATED A1C: CPT | Performed by: INTERNAL MEDICINE

## 2022-04-06 PROCEDURE — 83970 ASSAY OF PARATHORMONE: CPT | Mod: PO | Performed by: INTERNAL MEDICINE

## 2022-04-06 PROCEDURE — 84550 ASSAY OF BLOOD/URIC ACID: CPT | Performed by: INTERNAL MEDICINE

## 2022-04-06 PROCEDURE — 36415 COLL VENOUS BLD VENIPUNCTURE: CPT | Mod: PO | Performed by: INTERNAL MEDICINE

## 2022-05-03 ENCOUNTER — LAB VISIT (OUTPATIENT)
Dept: LAB | Facility: HOSPITAL | Age: 67
End: 2022-05-03
Attending: INTERNAL MEDICINE
Payer: MEDICAID

## 2022-05-03 DIAGNOSIS — E79.0 HYPERURICEMIA: ICD-10-CM

## 2022-05-03 DIAGNOSIS — N18.32 ANEMIA DUE TO STAGE 3B CHRONIC KIDNEY DISEASE: ICD-10-CM

## 2022-05-03 DIAGNOSIS — D63.1 ANEMIA DUE TO STAGE 3B CHRONIC KIDNEY DISEASE: ICD-10-CM

## 2022-05-03 DIAGNOSIS — N18.32 STAGE 3B CHRONIC KIDNEY DISEASE: ICD-10-CM

## 2022-05-03 DIAGNOSIS — N25.81 SECONDARY HYPERPARATHYROIDISM: ICD-10-CM

## 2022-05-03 LAB
ALBUMIN SERPL BCP-MCNC: 3.9 G/DL (ref 3.5–5.2)
ANION GAP SERPL CALC-SCNC: 12 MMOL/L (ref 8–16)
BASOPHILS # BLD AUTO: 0.09 K/UL (ref 0–0.2)
BASOPHILS NFR BLD: 1.2 % (ref 0–1.9)
CALCIUM SERPL-MCNC: 8.8 MG/DL (ref 8.7–10.5)
CHLORIDE SERPL-SCNC: 105 MMOL/L (ref 95–110)
CO2 SERPL-SCNC: 25 MMOL/L (ref 23–29)
CREAT SERPL-MCNC: 1.51 MG/DL (ref 0.5–1.4)
DIFFERENTIAL METHOD: ABNORMAL
EOSINOPHIL # BLD AUTO: 0.6 K/UL (ref 0–0.5)
EOSINOPHIL NFR BLD: 8.3 % (ref 0–8)
ERYTHROCYTE [DISTWIDTH] IN BLOOD BY AUTOMATED COUNT: 12.7 % (ref 11.5–14.5)
EST. GFR  (AFRICAN AMERICAN): 41.8 ML/MIN/1.73 M^2
EST. GFR  (NON AFRICAN AMERICAN): 36.3 ML/MIN/1.73 M^2
ESTIMATED AVG GLUCOSE: 137 MG/DL (ref 68–131)
GLUCOSE SERPL-MCNC: 215 MG/DL (ref 70–110)
HBA1C MFR BLD: 6.4 % (ref 4–5.6)
HCT VFR BLD AUTO: 34.1 % (ref 37–48.5)
HGB BLD-MCNC: 10.8 G/DL (ref 12–16)
IMM GRANULOCYTES # BLD AUTO: 0.01 K/UL (ref 0–0.04)
IMM GRANULOCYTES NFR BLD AUTO: 0.1 % (ref 0–0.5)
LYMPHOCYTES # BLD AUTO: 2 K/UL (ref 1–4.8)
LYMPHOCYTES NFR BLD: 26.2 % (ref 18–48)
MCH RBC QN AUTO: 29.8 PG (ref 27–31)
MCHC RBC AUTO-ENTMCNC: 31.7 G/DL (ref 32–36)
MCV RBC AUTO: 94 FL (ref 82–98)
MONOCYTES # BLD AUTO: 0.6 K/UL (ref 0.3–1)
MONOCYTES NFR BLD: 8.3 % (ref 4–15)
NEUTROPHILS # BLD AUTO: 4.2 K/UL (ref 1.8–7.7)
NEUTROPHILS NFR BLD: 55.9 % (ref 38–73)
NRBC BLD-RTO: 0 /100 WBC
PHOSPHATE SERPL-MCNC: 4.9 MG/DL (ref 2.7–4.5)
PLATELET # BLD AUTO: 190 K/UL (ref 150–450)
PMV BLD AUTO: 11.3 FL (ref 9.2–12.9)
POTASSIUM SERPL-SCNC: 4.9 MMOL/L (ref 3.5–5.1)
PTH-INTACT SERPL-MCNC: 82 PG/ML (ref 9–77)
RBC # BLD AUTO: 3.62 M/UL (ref 4–5.4)
SODIUM SERPL-SCNC: 142 MMOL/L (ref 136–145)
URATE SERPL-MCNC: 7.6 MG/DL (ref 2.4–5.7)
UUN UR-MCNC: 21 MG/DL (ref 7–17)
WBC # BLD AUTO: 7.51 K/UL (ref 3.9–12.7)

## 2022-05-03 PROCEDURE — 85025 COMPLETE CBC W/AUTO DIFF WBC: CPT | Mod: PO | Performed by: INTERNAL MEDICINE

## 2022-05-03 PROCEDURE — 83970 ASSAY OF PARATHORMONE: CPT | Mod: PO | Performed by: INTERNAL MEDICINE

## 2022-05-03 PROCEDURE — 80069 RENAL FUNCTION PANEL: CPT | Mod: PO | Performed by: INTERNAL MEDICINE

## 2022-05-03 PROCEDURE — 83036 HEMOGLOBIN GLYCOSYLATED A1C: CPT | Performed by: INTERNAL MEDICINE

## 2022-05-03 PROCEDURE — 36415 COLL VENOUS BLD VENIPUNCTURE: CPT | Mod: PO | Performed by: INTERNAL MEDICINE

## 2022-05-03 PROCEDURE — 84550 ASSAY OF BLOOD/URIC ACID: CPT | Performed by: INTERNAL MEDICINE

## 2022-06-27 ENCOUNTER — LAB VISIT (OUTPATIENT)
Dept: LAB | Facility: HOSPITAL | Age: 67
End: 2022-06-27
Attending: INTERNAL MEDICINE
Payer: MEDICAID

## 2022-06-27 DIAGNOSIS — N18.32 ANEMIA DUE TO STAGE 3B CHRONIC KIDNEY DISEASE: ICD-10-CM

## 2022-06-27 DIAGNOSIS — N18.32 STAGE 3B CHRONIC KIDNEY DISEASE: ICD-10-CM

## 2022-06-27 DIAGNOSIS — E09.9 STEROID-INDUCED DIABETES MELLITUS, SUBSEQUENT ENCOUNTER: ICD-10-CM

## 2022-06-27 DIAGNOSIS — T38.0X5D STEROID-INDUCED DIABETES MELLITUS, SUBSEQUENT ENCOUNTER: ICD-10-CM

## 2022-06-27 DIAGNOSIS — D63.1 ANEMIA DUE TO STAGE 3B CHRONIC KIDNEY DISEASE: ICD-10-CM

## 2022-06-27 LAB
ALBUMIN SERPL BCP-MCNC: 4.2 G/DL (ref 3.5–5.2)
ANION GAP SERPL CALC-SCNC: 9 MMOL/L (ref 8–16)
BASOPHILS # BLD AUTO: 0.06 K/UL (ref 0–0.2)
BASOPHILS NFR BLD: 0.7 % (ref 0–1.9)
CALCIUM SERPL-MCNC: 9.2 MG/DL (ref 8.7–10.5)
CHLORIDE SERPL-SCNC: 104 MMOL/L (ref 95–110)
CO2 SERPL-SCNC: 26 MMOL/L (ref 23–29)
CREAT SERPL-MCNC: 1.87 MG/DL (ref 0.5–1.4)
DIFFERENTIAL METHOD: ABNORMAL
EOSINOPHIL # BLD AUTO: 0.4 K/UL (ref 0–0.5)
EOSINOPHIL NFR BLD: 4.4 % (ref 0–8)
ERYTHROCYTE [DISTWIDTH] IN BLOOD BY AUTOMATED COUNT: 13.2 % (ref 11.5–14.5)
EST. GFR  (AFRICAN AMERICAN): 32.3 ML/MIN/1.73 M^2
EST. GFR  (NON AFRICAN AMERICAN): 28 ML/MIN/1.73 M^2
ESTIMATED AVG GLUCOSE: 157 MG/DL (ref 68–131)
FERRITIN SERPL-MCNC: 111 NG/ML (ref 20–300)
GLUCOSE SERPL-MCNC: 124 MG/DL (ref 70–110)
HBA1C MFR BLD: 7.1 % (ref 4–5.6)
HCT VFR BLD AUTO: 35.6 % (ref 37–48.5)
HGB BLD-MCNC: 11.4 G/DL (ref 12–16)
IMM GRANULOCYTES # BLD AUTO: 0.02 K/UL (ref 0–0.04)
IMM GRANULOCYTES NFR BLD AUTO: 0.2 % (ref 0–0.5)
IRON SERPL-MCNC: 51 UG/DL (ref 30–160)
LYMPHOCYTES # BLD AUTO: 2.1 K/UL (ref 1–4.8)
LYMPHOCYTES NFR BLD: 26.5 % (ref 18–48)
MCH RBC QN AUTO: 30.2 PG (ref 27–31)
MCHC RBC AUTO-ENTMCNC: 32 G/DL (ref 32–36)
MCV RBC AUTO: 94 FL (ref 82–98)
MONOCYTES # BLD AUTO: 0.6 K/UL (ref 0.3–1)
MONOCYTES NFR BLD: 7.3 % (ref 4–15)
NEUTROPHILS # BLD AUTO: 4.9 K/UL (ref 1.8–7.7)
NEUTROPHILS NFR BLD: 60.9 % (ref 38–73)
NRBC BLD-RTO: 0 /100 WBC
PHOSPHATE SERPL-MCNC: 4.6 MG/DL (ref 2.7–4.5)
PLATELET # BLD AUTO: 228 K/UL (ref 150–450)
PMV BLD AUTO: 10.9 FL (ref 9.2–12.9)
POTASSIUM SERPL-SCNC: 5.6 MMOL/L (ref 3.5–5.1)
PTH-INTACT SERPL-MCNC: 77.1 PG/ML (ref 9–77)
RBC # BLD AUTO: 3.77 M/UL (ref 4–5.4)
SATURATED IRON: 15 % (ref 20–50)
SODIUM SERPL-SCNC: 139 MMOL/L (ref 136–145)
TOTAL IRON BINDING CAPACITY: 333 UG/DL (ref 250–450)
TRANSFERRIN SERPL-MCNC: 225 MG/DL (ref 200–375)
URATE SERPL-MCNC: 8.4 MG/DL (ref 2.4–5.7)
UUN UR-MCNC: 28 MG/DL (ref 7–17)
WBC # BLD AUTO: 8.04 K/UL (ref 3.9–12.7)

## 2022-06-27 PROCEDURE — 82728 ASSAY OF FERRITIN: CPT | Performed by: INTERNAL MEDICINE

## 2022-06-27 PROCEDURE — 84550 ASSAY OF BLOOD/URIC ACID: CPT | Performed by: INTERNAL MEDICINE

## 2022-06-27 PROCEDURE — 83970 ASSAY OF PARATHORMONE: CPT | Mod: PO | Performed by: INTERNAL MEDICINE

## 2022-06-27 PROCEDURE — 80069 RENAL FUNCTION PANEL: CPT | Mod: PO | Performed by: INTERNAL MEDICINE

## 2022-06-27 PROCEDURE — 36415 COLL VENOUS BLD VENIPUNCTURE: CPT | Mod: PO | Performed by: INTERNAL MEDICINE

## 2022-06-27 PROCEDURE — 83036 HEMOGLOBIN GLYCOSYLATED A1C: CPT | Performed by: INTERNAL MEDICINE

## 2022-06-27 PROCEDURE — 85025 COMPLETE CBC W/AUTO DIFF WBC: CPT | Mod: PO | Performed by: INTERNAL MEDICINE

## 2022-06-27 PROCEDURE — 84466 ASSAY OF TRANSFERRIN: CPT | Mod: PO | Performed by: INTERNAL MEDICINE

## 2022-06-30 ENCOUNTER — LAB VISIT (OUTPATIENT)
Dept: LAB | Facility: HOSPITAL | Age: 67
End: 2022-06-30
Attending: INTERNAL MEDICINE
Payer: MEDICAID

## 2022-06-30 DIAGNOSIS — N18.4 CKD (CHRONIC KIDNEY DISEASE) STAGE 4, GFR 15-29 ML/MIN: ICD-10-CM

## 2022-06-30 LAB
ALBUMIN SERPL BCP-MCNC: 4.1 G/DL (ref 3.5–5.2)
ANION GAP SERPL CALC-SCNC: 8 MMOL/L (ref 8–16)
CALCIUM SERPL-MCNC: 8.9 MG/DL (ref 8.7–10.5)
CHLORIDE SERPL-SCNC: 105 MMOL/L (ref 95–110)
CO2 SERPL-SCNC: 26 MMOL/L (ref 23–29)
CREAT SERPL-MCNC: 1.79 MG/DL (ref 0.5–1.4)
EST. GFR  (AFRICAN AMERICAN): 34 ML/MIN/1.73 M^2
EST. GFR  (NON AFRICAN AMERICAN): 29.5 ML/MIN/1.73 M^2
GLUCOSE SERPL-MCNC: 222 MG/DL (ref 70–110)
PHOSPHATE SERPL-MCNC: 5 MG/DL (ref 2.7–4.5)
POTASSIUM SERPL-SCNC: 5.1 MMOL/L (ref 3.5–5.1)
SODIUM SERPL-SCNC: 139 MMOL/L (ref 136–145)
UUN UR-MCNC: 24 MG/DL (ref 7–17)

## 2022-06-30 PROCEDURE — 36415 COLL VENOUS BLD VENIPUNCTURE: CPT | Mod: PO | Performed by: INTERNAL MEDICINE

## 2022-06-30 PROCEDURE — 80069 RENAL FUNCTION PANEL: CPT | Mod: PO | Performed by: INTERNAL MEDICINE

## 2022-07-18 DIAGNOSIS — Z12.31 ENCOUNTER FOR SCREENING MAMMOGRAM FOR MALIGNANT NEOPLASM OF BREAST: Primary | ICD-10-CM

## 2022-07-21 ENCOUNTER — LAB VISIT (OUTPATIENT)
Dept: LAB | Facility: HOSPITAL | Age: 67
End: 2022-07-21
Attending: NURSE PRACTITIONER
Payer: MEDICAID

## 2022-07-21 DIAGNOSIS — E11.9 TYPE 2 DIABETES MELLITUS WITHOUT COMPLICATIONS: ICD-10-CM

## 2022-07-21 DIAGNOSIS — I10 ESSENTIAL (PRIMARY) HYPERTENSION: Primary | ICD-10-CM

## 2022-07-21 LAB
BASOPHILS # BLD AUTO: 0.08 K/UL (ref 0–0.2)
BASOPHILS NFR BLD: 0.9 % (ref 0–1.9)
CHOLEST SERPL-MCNC: 154 MG/DL (ref 120–199)
CHOLEST/HDLC SERPL: 4.5 {RATIO} (ref 2–5)
DIFFERENTIAL METHOD: ABNORMAL
EOSINOPHIL # BLD AUTO: 0.3 K/UL (ref 0–0.5)
EOSINOPHIL NFR BLD: 4 % (ref 0–8)
ERYTHROCYTE [DISTWIDTH] IN BLOOD BY AUTOMATED COUNT: 13.1 % (ref 11.5–14.5)
ESTIMATED AVG GLUCOSE: 151 MG/DL (ref 68–131)
HBA1C MFR BLD: 6.9 % (ref 4–5.6)
HCT VFR BLD AUTO: 36 % (ref 37–48.5)
HDLC SERPL-MCNC: 34 MG/DL (ref 40–75)
HDLC SERPL: 22.1 % (ref 20–50)
HGB BLD-MCNC: 11.9 G/DL (ref 12–16)
IMM GRANULOCYTES # BLD AUTO: 0.02 K/UL (ref 0–0.04)
IMM GRANULOCYTES NFR BLD AUTO: 0.2 % (ref 0–0.5)
LDLC SERPL CALC-MCNC: 87.8 MG/DL (ref 63–159)
LYMPHOCYTES # BLD AUTO: 1.6 K/UL (ref 1–4.8)
LYMPHOCYTES NFR BLD: 19 % (ref 18–48)
MCH RBC QN AUTO: 30.7 PG (ref 27–31)
MCHC RBC AUTO-ENTMCNC: 33.1 G/DL (ref 32–36)
MCV RBC AUTO: 93 FL (ref 82–98)
MONOCYTES # BLD AUTO: 0.8 K/UL (ref 0.3–1)
MONOCYTES NFR BLD: 9.1 % (ref 4–15)
NEUTROPHILS # BLD AUTO: 5.7 K/UL (ref 1.8–7.7)
NEUTROPHILS NFR BLD: 66.8 % (ref 38–73)
NONHDLC SERPL-MCNC: 120 MG/DL
NRBC BLD-RTO: 0 /100 WBC
PLATELET # BLD AUTO: 259 K/UL (ref 150–450)
PMV BLD AUTO: 11 FL (ref 9.2–12.9)
RBC # BLD AUTO: 3.88 M/UL (ref 4–5.4)
TRIGL SERPL-MCNC: 161 MG/DL (ref 30–150)
TSH SERPL DL<=0.005 MIU/L-ACNC: 3.24 UIU/ML (ref 0.4–4)
WBC # BLD AUTO: 8.54 K/UL (ref 3.9–12.7)

## 2022-07-21 PROCEDURE — 80061 LIPID PANEL: CPT | Performed by: NURSE PRACTITIONER

## 2022-07-21 PROCEDURE — 85025 COMPLETE CBC W/AUTO DIFF WBC: CPT | Mod: 91,PO | Performed by: NURSE PRACTITIONER

## 2022-07-21 PROCEDURE — 84443 ASSAY THYROID STIM HORMONE: CPT | Mod: PO | Performed by: NURSE PRACTITIONER

## 2022-07-21 PROCEDURE — 83036 HEMOGLOBIN GLYCOSYLATED A1C: CPT | Performed by: NURSE PRACTITIONER

## 2022-07-22 ENCOUNTER — LAB VISIT (OUTPATIENT)
Dept: LAB | Facility: HOSPITAL | Age: 67
End: 2022-07-22
Attending: INTERNAL MEDICINE
Payer: MEDICAID

## 2022-07-22 DIAGNOSIS — N39.0 UTI (URINARY TRACT INFECTION), UNCOMPLICATED: ICD-10-CM

## 2022-07-22 PROCEDURE — 87086 URINE CULTURE/COLONY COUNT: CPT | Mod: PO | Performed by: INTERNAL MEDICINE

## 2022-07-24 LAB — BACTERIA UR CULT: NO GROWTH

## 2022-08-08 ENCOUNTER — LAB VISIT (OUTPATIENT)
Dept: LAB | Facility: HOSPITAL | Age: 67
End: 2022-08-08
Attending: INTERNAL MEDICINE
Payer: MEDICAID

## 2022-08-08 DIAGNOSIS — R80.1 PERSISTENT PROTEINURIA: ICD-10-CM

## 2022-08-08 DIAGNOSIS — R31.9 HEMATURIA, UNSPECIFIED TYPE: ICD-10-CM

## 2022-08-08 DIAGNOSIS — R82.90 URINE ABNORMALITY: ICD-10-CM

## 2022-08-08 DIAGNOSIS — N18.4 CKD (CHRONIC KIDNEY DISEASE) STAGE 4, GFR 15-29 ML/MIN: ICD-10-CM

## 2022-08-08 LAB
BILIRUB UR QL STRIP: NEGATIVE
CLARITY UR REFRACT.AUTO: CLEAR
COLOR UR AUTO: YELLOW
GLUCOSE UR QL STRIP: NEGATIVE
HGB UR QL STRIP: ABNORMAL
KETONES UR QL STRIP: ABNORMAL
LEUKOCYTE ESTERASE UR QL STRIP: NEGATIVE
NITRITE UR QL STRIP: NEGATIVE
PH UR STRIP: 5 [PH] (ref 5–8)
PROT UR QL STRIP: NEGATIVE
SP GR UR STRIP: 1.02 (ref 1–1.03)
URN SPEC COLLECT METH UR: ABNORMAL
UROBILINOGEN UR STRIP-ACNC: NEGATIVE EU/DL

## 2022-08-08 PROCEDURE — 81003 URINALYSIS AUTO W/O SCOPE: CPT | Mod: PO | Performed by: INTERNAL MEDICINE

## 2022-09-06 ENCOUNTER — LAB VISIT (OUTPATIENT)
Dept: LAB | Facility: HOSPITAL | Age: 67
End: 2022-09-06
Attending: INTERNAL MEDICINE
Payer: MEDICAID

## 2022-09-06 DIAGNOSIS — N18.4 CKD (CHRONIC KIDNEY DISEASE) STAGE 4, GFR 15-29 ML/MIN: ICD-10-CM

## 2022-09-06 LAB
ALBUMIN SERPL BCP-MCNC: 4 G/DL (ref 3.5–5.2)
ANION GAP SERPL CALC-SCNC: 8 MMOL/L (ref 8–16)
BASOPHILS # BLD AUTO: 0.07 K/UL (ref 0–0.2)
BASOPHILS NFR BLD: 1.4 % (ref 0–1.9)
CALCIUM SERPL-MCNC: 9.2 MG/DL (ref 8.7–10.5)
CHLORIDE SERPL-SCNC: 106 MMOL/L (ref 95–110)
CO2 SERPL-SCNC: 26 MMOL/L (ref 23–29)
CREAT SERPL-MCNC: 2.29 MG/DL (ref 0.5–1.4)
DIFFERENTIAL METHOD: ABNORMAL
EOSINOPHIL # BLD AUTO: 0.2 K/UL (ref 0–0.5)
EOSINOPHIL NFR BLD: 4 % (ref 0–8)
ERYTHROCYTE [DISTWIDTH] IN BLOOD BY AUTOMATED COUNT: 13.2 % (ref 11.5–14.5)
EST. GFR  (NO RACE VARIABLE): 23.3 ML/MIN/1.73 M^2
GLUCOSE SERPL-MCNC: 129 MG/DL (ref 70–110)
HCT VFR BLD AUTO: 34 % (ref 37–48.5)
HGB BLD-MCNC: 10.8 G/DL (ref 12–16)
IMM GRANULOCYTES # BLD AUTO: 0 K/UL (ref 0–0.04)
IMM GRANULOCYTES NFR BLD AUTO: 0 % (ref 0–0.5)
LYMPHOCYTES # BLD AUTO: 1.6 K/UL (ref 1–4.8)
LYMPHOCYTES NFR BLD: 32.1 % (ref 18–48)
MCH RBC QN AUTO: 30.3 PG (ref 27–31)
MCHC RBC AUTO-ENTMCNC: 31.8 G/DL (ref 32–36)
MCV RBC AUTO: 96 FL (ref 82–98)
MONOCYTES # BLD AUTO: 0.7 K/UL (ref 0.3–1)
MONOCYTES NFR BLD: 14.3 % (ref 4–15)
NEUTROPHILS # BLD AUTO: 2.4 K/UL (ref 1.8–7.7)
NEUTROPHILS NFR BLD: 48.2 % (ref 38–73)
NRBC BLD-RTO: 0 /100 WBC
PHOSPHATE SERPL-MCNC: 4.4 MG/DL (ref 2.7–4.5)
PLATELET # BLD AUTO: 175 K/UL (ref 150–450)
PMV BLD AUTO: 10.4 FL (ref 9.2–12.9)
POTASSIUM SERPL-SCNC: 5.2 MMOL/L (ref 3.5–5.1)
RBC # BLD AUTO: 3.56 M/UL (ref 4–5.4)
SODIUM SERPL-SCNC: 140 MMOL/L (ref 136–145)
UUN UR-MCNC: 26 MG/DL (ref 7–17)
WBC # BLD AUTO: 5.05 K/UL (ref 3.9–12.7)

## 2022-09-06 PROCEDURE — 36415 COLL VENOUS BLD VENIPUNCTURE: CPT | Mod: PO | Performed by: INTERNAL MEDICINE

## 2022-09-06 PROCEDURE — 80069 RENAL FUNCTION PANEL: CPT | Mod: PO | Performed by: INTERNAL MEDICINE

## 2022-09-06 PROCEDURE — 83970 ASSAY OF PARATHORMONE: CPT | Mod: PO | Performed by: INTERNAL MEDICINE

## 2022-09-06 PROCEDURE — 84550 ASSAY OF BLOOD/URIC ACID: CPT | Mod: PO | Performed by: INTERNAL MEDICINE

## 2022-09-06 PROCEDURE — 85025 COMPLETE CBC W/AUTO DIFF WBC: CPT | Mod: PO | Performed by: INTERNAL MEDICINE

## 2022-09-07 LAB
PTH-INTACT SERPL-MCNC: 67.5 PG/ML (ref 9–77)
URATE SERPL-MCNC: 7.8 MG/DL (ref 2.4–5.7)

## 2022-11-11 ENCOUNTER — HOSPITAL ENCOUNTER (EMERGENCY)
Facility: HOSPITAL | Age: 67
Discharge: HOME | End: 2022-11-11
Attending: EMERGENCY MEDICINE
Payer: COMMERCIAL

## 2022-11-11 VITALS
WEIGHT: 250 LBS | HEIGHT: 66 IN | SYSTOLIC BLOOD PRESSURE: 146 MMHG | HEART RATE: 93 BPM | RESPIRATION RATE: 20 BRPM | OXYGEN SATURATION: 98 % | TEMPERATURE: 98 F | DIASTOLIC BLOOD PRESSURE: 70 MMHG | BODY MASS INDEX: 40.18 KG/M2

## 2022-11-11 DIAGNOSIS — R11.2 NAUSEA AND VOMITING, UNSPECIFIED VOMITING TYPE: Primary | ICD-10-CM

## 2022-11-11 DIAGNOSIS — K52.9 GASTROENTERITIS: ICD-10-CM

## 2022-11-11 LAB
ALBUMIN SERPL BCP-MCNC: 4 G/DL (ref 3.5–5.2)
ALP SERPL-CCNC: 124 U/L (ref 38–126)
ALT SERPL W/O P-5'-P-CCNC: 14 U/L (ref 10–44)
ANION GAP SERPL CALC-SCNC: 10 MMOL/L (ref 8–16)
AST SERPL-CCNC: 18 U/L (ref 15–46)
BASOPHILS # BLD AUTO: 0.03 K/UL (ref 0–0.2)
BASOPHILS NFR BLD: 0.2 % (ref 0–1.9)
BILIRUB SERPL-MCNC: 0.6 MG/DL (ref 0.1–1)
BILIRUB UR QL STRIP: NEGATIVE
CALCIUM SERPL-MCNC: 8.8 MG/DL (ref 8.7–10.5)
CHLORIDE SERPL-SCNC: 106 MMOL/L (ref 95–110)
CLARITY UR REFRACT.AUTO: CLEAR
CO2 SERPL-SCNC: 23 MMOL/L (ref 23–29)
COLOR UR AUTO: YELLOW
CREAT SERPL-MCNC: 1.77 MG/DL (ref 0.5–1.4)
DIFFERENTIAL METHOD: ABNORMAL
EOSINOPHIL # BLD AUTO: 1.5 K/UL (ref 0–0.5)
EOSINOPHIL NFR BLD: 11.9 % (ref 0–8)
ERYTHROCYTE [DISTWIDTH] IN BLOOD BY AUTOMATED COUNT: 13 % (ref 11.5–14.5)
EST. GFR  (NO RACE VARIABLE): 31.7 ML/MIN/1.73 M^2
GLUCOSE SERPL-MCNC: 140 MG/DL (ref 70–110)
GLUCOSE UR QL STRIP: NEGATIVE
HCT VFR BLD AUTO: 37.4 % (ref 37–48.5)
HGB BLD-MCNC: 12.3 G/DL (ref 12–16)
HGB UR QL STRIP: ABNORMAL
IMM GRANULOCYTES # BLD AUTO: 0.07 K/UL (ref 0–0.04)
IMM GRANULOCYTES NFR BLD AUTO: 0.5 % (ref 0–0.5)
INFLUENZA A, MOLECULAR: NEGATIVE
INFLUENZA B, MOLECULAR: NEGATIVE
KETONES UR QL STRIP: NEGATIVE
LEUKOCYTE ESTERASE UR QL STRIP: NEGATIVE
LIPASE SERPL-CCNC: 159 U/L (ref 23–300)
LYMPHOCYTES # BLD AUTO: 1.2 K/UL (ref 1–4.8)
LYMPHOCYTES NFR BLD: 9.1 % (ref 18–48)
MCH RBC QN AUTO: 31 PG (ref 27–31)
MCHC RBC AUTO-ENTMCNC: 32.9 G/DL (ref 32–36)
MCV RBC AUTO: 94 FL (ref 82–98)
MICROSCOPIC COMMENT: ABNORMAL
MONOCYTES # BLD AUTO: 0.7 K/UL (ref 0.3–1)
MONOCYTES NFR BLD: 5.8 % (ref 4–15)
NEUTROPHILS # BLD AUTO: 9.3 K/UL (ref 1.8–7.7)
NEUTROPHILS NFR BLD: 72.5 % (ref 38–73)
NITRITE UR QL STRIP: NEGATIVE
NRBC BLD-RTO: 0 /100 WBC
PH UR STRIP: 6 [PH] (ref 5–8)
PLATELET # BLD AUTO: 197 K/UL (ref 150–450)
PMV BLD AUTO: 10.7 FL (ref 9.2–12.9)
POCT GLUCOSE: 131 MG/DL (ref 70–110)
POTASSIUM SERPL-SCNC: 5.1 MMOL/L (ref 3.5–5.1)
PROT SERPL-MCNC: 7.7 G/DL (ref 6–8.4)
PROT UR QL STRIP: NEGATIVE
RBC # BLD AUTO: 3.97 M/UL (ref 4–5.4)
RBC #/AREA URNS AUTO: 6 /HPF (ref 0–4)
SODIUM SERPL-SCNC: 139 MMOL/L (ref 136–145)
SP GR UR STRIP: 1.02 (ref 1–1.03)
SPECIMEN SOURCE: NORMAL
URN SPEC COLLECT METH UR: ABNORMAL
UROBILINOGEN UR STRIP-ACNC: NEGATIVE EU/DL
UUN UR-MCNC: 26 MG/DL (ref 7–17)
WBC # BLD AUTO: 12.78 K/UL (ref 3.9–12.7)

## 2022-11-11 PROCEDURE — 99284 EMERGENCY DEPT VISIT MOD MDM: CPT | Mod: 25,ER

## 2022-11-11 PROCEDURE — 96361 HYDRATE IV INFUSION ADD-ON: CPT | Mod: ER

## 2022-11-11 PROCEDURE — 25000003 PHARM REV CODE 250: Mod: ER | Performed by: PHYSICIAN ASSISTANT

## 2022-11-11 PROCEDURE — 96374 THER/PROPH/DIAG INJ IV PUSH: CPT | Mod: ER

## 2022-11-11 PROCEDURE — 87502 INFLUENZA DNA AMP PROBE: CPT | Mod: ER | Performed by: FAMILY MEDICINE

## 2022-11-11 PROCEDURE — 83690 ASSAY OF LIPASE: CPT | Mod: ER | Performed by: PHYSICIAN ASSISTANT

## 2022-11-11 PROCEDURE — 85025 COMPLETE CBC W/AUTO DIFF WBC: CPT | Mod: ER | Performed by: PHYSICIAN ASSISTANT

## 2022-11-11 PROCEDURE — 81000 URINALYSIS NONAUTO W/SCOPE: CPT | Mod: ER | Performed by: PHYSICIAN ASSISTANT

## 2022-11-11 PROCEDURE — 82962 GLUCOSE BLOOD TEST: CPT | Mod: ER

## 2022-11-11 PROCEDURE — 63600175 PHARM REV CODE 636 W HCPCS: Mod: ER | Performed by: PHYSICIAN ASSISTANT

## 2022-11-11 PROCEDURE — 80053 COMPREHEN METABOLIC PANEL: CPT | Mod: ER | Performed by: PHYSICIAN ASSISTANT

## 2022-11-11 RX ORDER — ONDANSETRON 4 MG/1
4 TABLET, ORALLY DISINTEGRATING ORAL 2 TIMES DAILY
Qty: 30 TABLET | Refills: 0 | Status: SHIPPED | OUTPATIENT
Start: 2022-11-11 | End: 2023-05-17

## 2022-11-11 RX ORDER — ONDANSETRON 2 MG/ML
4 INJECTION INTRAMUSCULAR; INTRAVENOUS
Status: COMPLETED | OUTPATIENT
Start: 2022-11-11 | End: 2022-11-11

## 2022-11-11 RX ADMIN — SODIUM CHLORIDE 500 ML: 0.9 INJECTION, SOLUTION INTRAVENOUS at 04:11

## 2022-11-11 RX ADMIN — ONDANSETRON HYDROCHLORIDE 4 MG: 2 INJECTION, SOLUTION INTRAMUSCULAR; INTRAVENOUS at 04:11

## 2022-11-11 NOTE — ED PROVIDER NOTES
"Encounter Date: 11/11/2022       History     Chief Complaint   Patient presents with    Vomiting     Reports N/V and upper abd pain starting Sunday night and again today. Denies any fever.     66-year-old female presents to ED with concern of nausea, vomiting, diarrhea and generalized abdominal pain that began early this morning around 3:00 a.m..  She does report similar symptoms happening roughly 5 days ago, stating she had multiple episodes of nausea and vomiting throughout the day but symptoms spontaneously resolved that day.  She states "I was doing well until this morning when the symptoms came back".  She reports roughly 7 episodes of both NBNB emesis and NB diarrhea. Denies fevers, chills, current nausea, chest pain, cough, shortness of breath, dysuria, changes in urine color or frequency, vaginal bleeding or discharge.  Current abdominal pain described as generalized, constant, "muscle soreness", with severity 5/10.  She has been unable to tolerate p.o. today.  No other acute complaints at this time    The history is provided by the patient.   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     Hematuria     HTN (hypertension) 05/20/2021    Hyperglycemia due to diabetes mellitus     Hyperkalemia     Hypocalcemia     IgA nephropathy     Osteopenia     Proteinuria     Secondary hyperparathyroidism     Steroid-induced osteopenia     Vitamin D deficiency      Past Surgical History:   Procedure Laterality Date    Evacuation of hematoma of leg Right     KNEE ARTHROPLASTY Left 3/18/2019    Procedure: ARTHROPLASTY, KNEE left;  Surgeon: Niraj Pérez MD;  Location: [facility];  Service: Orthopedics;  Laterality: Left;    TUBAL LIGATION       No family " history on file.  Social History     Tobacco Use    Smoking status: Never    Smokeless tobacco: Never   Substance Use Topics    Alcohol use: No    Drug use: No     Review of Systems   Constitutional:  Negative for chills and fever.   HENT:  Negative for congestion and sore throat.    Respiratory:  Negative for cough and shortness of breath.    Cardiovascular:  Negative for chest pain.   Gastrointestinal:  Positive for abdominal pain, diarrhea, nausea and vomiting. Negative for blood in stool and rectal pain.   Genitourinary:  Negative for dysuria, flank pain, hematuria, vaginal bleeding and vaginal discharge.   Musculoskeletal:  Negative for back pain, neck pain and neck stiffness.   Neurological:  Negative for headaches.     Physical Exam     Initial Vitals [11/11/22 1517]   BP Pulse Resp Temp SpO2   139/74 (!) 125 20 98.2 °F (36.8 °C) 95 %      MAP       --         Physical Exam    Nursing note and vitals reviewed.  Constitutional: She appears well-developed and well-nourished. She is cooperative. She does not have a sickly appearance. She does not appear ill. No distress.   Resting on bed, no apparent distress   HENT:   Head: Normocephalic and atraumatic.   Mildly dry mucous membranes   Eyes: EOM are normal.   Neck: Neck supple.   Normal range of motion.  Cardiovascular:  Regular rhythm and normal heart sounds.           Mildly tachycardic   Pulmonary/Chest: Effort normal and breath sounds normal.   Abdominal: Abdomen is soft. There is generalized abdominal tenderness.   Obese abdomen.  Generalized abdominal tenderness with no specific area of pinpoint tenderness.  No overlying skin changes.  Bowel sounds intact.   Musculoskeletal:      Cervical back: Normal range of motion and neck supple.     Neurological: She is alert. She is not disoriented. GCS eye subscore is 4. GCS verbal subscore is 5. GCS motor subscore is 6.   Skin: Skin is warm and dry.   Psychiatric: She has a normal mood and affect. Her speech is  normal and behavior is normal. Thought content normal.       ED Course   Procedures  Labs Reviewed   CBC W/ AUTO DIFFERENTIAL - Abnormal; Notable for the following components:       Result Value    WBC 12.78 (*)     RBC 3.97 (*)     Gran # (ANC) 9.3 (*)     Immature Grans (Abs) 0.07 (*)     Eos # 1.5 (*)     Lymph % 9.1 (*)     Eosinophil % 11.9 (*)     All other components within normal limits   COMPREHENSIVE METABOLIC PANEL - Abnormal; Notable for the following components:    Glucose 140 (*)     BUN 26 (*)     Creatinine 1.77 (*)     eGFR 31.7 (*)     All other components within normal limits   URINALYSIS, REFLEX TO URINE CULTURE - Abnormal; Notable for the following components:    Occult Blood UA 1+ (*)     All other components within normal limits    Narrative:     Preferred Collection Type->Urine, Clean Catch  Specimen Source->Urine   URINALYSIS MICROSCOPIC - Abnormal; Notable for the following components:    RBC, UA 6 (*)     All other components within normal limits    Narrative:     Preferred Collection Type->Urine, Clean Catch  Specimen Source->Urine   POCT GLUCOSE - Abnormal; Notable for the following components:    POCT Glucose 131 (*)     All other components within normal limits   INFLUENZA A & B BY MOLECULAR   LIPASE   POCT GLUCOSE MONITORING CONTINUOUS          Imaging Results    None          Medications   sodium chloride 0.9% bolus 500 mL (0 mLs Intravenous Stopped 11/11/22 1722)   ondansetron injection 4 mg (4 mg Intravenous Given 11/11/22 1630)     Medical Decision Making:   Initial Assessment:   Patient presents with concern of nausea, vomiting and diarrhea that began this morning at 3:00 a.m..  Does report having similar symptoms 5 days ago that spontaneously resolved.  Afebrile arrival.  Generalized abdominal tenderness noted on exam.  Bowel sounds intact.  Differential Diagnosis:   Viral, influenza, GERD, intestinal spasm, gastroenteritis, gastritis, ulcer, cholecystitis, cholelithiasis,  gallstones, pancreatitis, ileus, small bowel obstruction, appendicitis, diverticulitis, colitis, constipation, intestinal gas pain, UTI      ED Management:  Influenza, CBC, CMP, lipase, UA    Flu negative.  CBC noting slight elevation WBC 12.7 K; I suspect is due to her repetitive vomiting.  CMP appearing grossly unremarkable.  Creatinine  appearing improved from baseline.  Lipase WNL.  UA showing no significant evidence for urinary infection.    Patient was given gentle IV fluids and Zofran in ED with reported improvement of her symptoms.  Vitals remained stable.  Stable for discharge.  Prescription for Zofran.  Encouraged oral hydration, rest, monitor symptoms closely, close PCP follow-up.  ED return precautions discussed.  Patient states her understanding and agrees with plan.                        Clinical Impression:   Final diagnoses:  [K52.9] Gastroenteritis  [R11.2] Nausea and vomiting, unspecified vomiting type (Primary)      ED Disposition Condition    Discharge Stable          ED Prescriptions       Medication Sig Dispense Start Date End Date Auth. Provider    ondansetron (ZOFRAN-ODT) 4 MG TbDL Take 1 tablet (4 mg total) by mouth 2 (two) times daily. 30 tablet 11/11/2022 -- Moises Huddleston PA-C          Follow-up Information       Follow up With Specialties Details Why Contact Info    Zunilda Dickson MD Internal Medicine   [address] [place] [address]  [address]  [facility] [address]  [phone]               Moises Huddleston PA-C  11/11/22 7461

## 2022-11-12 NOTE — DISCHARGE INSTRUCTIONS

## 2022-11-19 ENCOUNTER — HOSPITAL ENCOUNTER (EMERGENCY)
Facility: HOSPITAL | Age: 67
Discharge: HOME | End: 2022-11-19
Attending: EMERGENCY MEDICINE
Payer: MEDICARE

## 2022-11-19 VITALS
OXYGEN SATURATION: 99 % | WEIGHT: 250 LBS | BODY MASS INDEX: 40.18 KG/M2 | SYSTOLIC BLOOD PRESSURE: 117 MMHG | TEMPERATURE: 98 F | DIASTOLIC BLOOD PRESSURE: 56 MMHG | HEART RATE: 87 BPM | RESPIRATION RATE: 18 BRPM | HEIGHT: 66 IN

## 2022-11-19 DIAGNOSIS — R19.7 DIARRHEA OF PRESUMED INFECTIOUS ORIGIN: Primary | ICD-10-CM

## 2022-11-19 LAB
ALBUMIN SERPL BCP-MCNC: 3.5 G/DL (ref 3.5–5.2)
ALP SERPL-CCNC: 106 U/L (ref 55–135)
ALT SERPL W/O P-5'-P-CCNC: 6 U/L (ref 10–44)
ANION GAP SERPL CALC-SCNC: 12 MMOL/L (ref 8–16)
AST SERPL-CCNC: 14 U/L (ref 10–40)
BASOPHILS # BLD AUTO: 0.04 K/UL (ref 0–0.2)
BASOPHILS NFR BLD: 0.3 % (ref 0–1.9)
BILIRUB SERPL-MCNC: 0.5 MG/DL (ref 0.1–1)
BUN SERPL-MCNC: 23 MG/DL (ref 8–23)
CALCIUM SERPL-MCNC: 9.3 MG/DL (ref 8.7–10.5)
CHLORIDE SERPL-SCNC: 106 MMOL/L (ref 95–110)
CO2 SERPL-SCNC: 19 MMOL/L (ref 23–29)
CREAT SERPL-MCNC: 1.7 MG/DL (ref 0.5–1.4)
DIFFERENTIAL METHOD: ABNORMAL
EOSINOPHIL # BLD AUTO: 3.3 K/UL (ref 0–0.5)
EOSINOPHIL NFR BLD: 28.1 % (ref 0–8)
ERYTHROCYTE [DISTWIDTH] IN BLOOD BY AUTOMATED COUNT: 13.2 % (ref 11.5–14.5)
EST. GFR  (NO RACE VARIABLE): 33 ML/MIN/1.73 M^2
GLUCOSE SERPL-MCNC: 139 MG/DL (ref 70–110)
HCT VFR BLD AUTO: 38.3 % (ref 37–48.5)
HGB BLD-MCNC: 12.5 G/DL (ref 12–16)
IMM GRANULOCYTES # BLD AUTO: 0.03 K/UL (ref 0–0.04)
IMM GRANULOCYTES NFR BLD AUTO: 0.3 % (ref 0–0.5)
LIPASE SERPL-CCNC: 61 U/L (ref 4–60)
LYMPHOCYTES # BLD AUTO: 2 K/UL (ref 1–4.8)
LYMPHOCYTES NFR BLD: 16.9 % (ref 18–48)
MCH RBC QN AUTO: 31.3 PG (ref 27–31)
MCHC RBC AUTO-ENTMCNC: 32.6 G/DL (ref 32–36)
MCV RBC AUTO: 96 FL (ref 82–98)
MONOCYTES # BLD AUTO: 0.7 K/UL (ref 0.3–1)
MONOCYTES NFR BLD: 5.6 % (ref 4–15)
NEUTROPHILS # BLD AUTO: 5.7 K/UL (ref 1.8–7.7)
NEUTROPHILS NFR BLD: 48.8 % (ref 38–73)
NRBC BLD-RTO: 0 /100 WBC
OB PNL STL: POSITIVE
PLATELET # BLD AUTO: 155 K/UL (ref 150–450)
PLATELET BLD QL SMEAR: ABNORMAL
PMV BLD AUTO: 11 FL (ref 9.2–12.9)
POTASSIUM SERPL-SCNC: 4.6 MMOL/L (ref 3.5–5.1)
PROT SERPL-MCNC: 7.9 G/DL (ref 6–8.4)
RBC # BLD AUTO: 3.99 M/UL (ref 4–5.4)
SODIUM SERPL-SCNC: 137 MMOL/L (ref 136–145)
WBC # BLD AUTO: 11.62 K/UL (ref 3.9–12.7)
WBC #/AREA STL HPF: NORMAL /[HPF]

## 2022-11-19 PROCEDURE — 85025 COMPLETE CBC W/AUTO DIFF WBC: CPT | Performed by: PHYSICIAN ASSISTANT

## 2022-11-19 PROCEDURE — 87427 SHIGA-LIKE TOXIN AG IA: CPT | Mod: 59 | Performed by: PHYSICIAN ASSISTANT

## 2022-11-19 PROCEDURE — 83690 ASSAY OF LIPASE: CPT | Performed by: PHYSICIAN ASSISTANT

## 2022-11-19 PROCEDURE — 25000003 PHARM REV CODE 250: Performed by: PHYSICIAN ASSISTANT

## 2022-11-19 PROCEDURE — 87209 SMEAR COMPLEX STAIN: CPT | Performed by: PHYSICIAN ASSISTANT

## 2022-11-19 PROCEDURE — 87046 STOOL CULTR AEROBIC BACT EA: CPT | Performed by: PHYSICIAN ASSISTANT

## 2022-11-19 PROCEDURE — 96361 HYDRATE IV INFUSION ADD-ON: CPT

## 2022-11-19 PROCEDURE — 82272 OCCULT BLD FECES 1-3 TESTS: CPT | Performed by: PHYSICIAN ASSISTANT

## 2022-11-19 PROCEDURE — 87045 FECES CULTURE AEROBIC BACT: CPT | Performed by: PHYSICIAN ASSISTANT

## 2022-11-19 PROCEDURE — 87177 OVA AND PARASITES SMEARS: CPT | Performed by: PHYSICIAN ASSISTANT

## 2022-11-19 PROCEDURE — 96374 THER/PROPH/DIAG INJ IV PUSH: CPT

## 2022-11-19 PROCEDURE — 87449 NOS EACH ORGANISM AG IA: CPT | Performed by: PHYSICIAN ASSISTANT

## 2022-11-19 PROCEDURE — 87329 GIARDIA AG IA: CPT | Performed by: PHYSICIAN ASSISTANT

## 2022-11-19 PROCEDURE — 89055 LEUKOCYTE ASSESSMENT FECAL: CPT | Performed by: PHYSICIAN ASSISTANT

## 2022-11-19 PROCEDURE — 87425 ROTAVIRUS AG IA: CPT | Performed by: PHYSICIAN ASSISTANT

## 2022-11-19 PROCEDURE — 80053 COMPREHEN METABOLIC PANEL: CPT | Performed by: PHYSICIAN ASSISTANT

## 2022-11-19 PROCEDURE — 99285 EMERGENCY DEPT VISIT HI MDM: CPT | Mod: 25

## 2022-11-19 PROCEDURE — 63600175 PHARM REV CODE 636 W HCPCS: Performed by: PHYSICIAN ASSISTANT

## 2022-11-19 RX ORDER — BENZONATATE 100 MG/1
100 CAPSULE ORAL 3 TIMES DAILY
COMMUNITY
Start: 2022-07-18 | End: 2022-11-19

## 2022-11-19 RX ORDER — METRONIDAZOLE 500 MG/1
500 TABLET ORAL
Status: COMPLETED | OUTPATIENT
Start: 2022-11-19 | End: 2022-11-19

## 2022-11-19 RX ORDER — CIPROFLOXACIN 500 MG/1
500 TABLET ORAL
Status: DISCONTINUED | OUTPATIENT
Start: 2022-11-19 | End: 2022-11-19

## 2022-11-19 RX ORDER — CIPROFLOXACIN 500 MG/1
500 TABLET ORAL 2 TIMES DAILY
Qty: 20 TABLET | Refills: 0 | Status: SHIPPED | OUTPATIENT
Start: 2022-11-19 | End: 2022-11-26

## 2022-11-19 RX ORDER — AMOXICILLIN 875 MG/1
875 TABLET, FILM COATED ORAL 2 TIMES DAILY
COMMUNITY
Start: 2022-07-18 | End: 2022-11-19

## 2022-11-19 RX ORDER — ONDANSETRON 2 MG/ML
4 INJECTION INTRAMUSCULAR; INTRAVENOUS
Status: COMPLETED | OUTPATIENT
Start: 2022-11-19 | End: 2022-11-19

## 2022-11-19 RX ORDER — AMITRIPTYLINE HYDROCHLORIDE 50 MG/1
50 TABLET, FILM COATED ORAL NIGHTLY
COMMUNITY
Start: 2022-10-16

## 2022-11-19 RX ORDER — METRONIDAZOLE 500 MG/1
500 TABLET ORAL 3 TIMES DAILY
Qty: 21 TABLET | Refills: 0 | Status: SHIPPED | OUTPATIENT
Start: 2022-11-19 | End: 2022-11-26

## 2022-11-19 RX ORDER — DICYCLOMINE HYDROCHLORIDE 10 MG/1
20 CAPSULE ORAL
Status: COMPLETED | OUTPATIENT
Start: 2022-11-19 | End: 2022-11-19

## 2022-11-19 RX ORDER — PEN NEEDLE, DIABETIC 31 GX5/16"
NEEDLE, DISPOSABLE MISCELLANEOUS
COMMUNITY
Start: 2022-07-05 | End: 2023-05-17

## 2022-11-19 RX ORDER — CIPROFLOXACIN 500 MG/1
500 TABLET ORAL
Status: DISCONTINUED | OUTPATIENT
Start: 2022-11-19 | End: 2022-11-19 | Stop reason: HOSPADM

## 2022-11-19 RX ORDER — AMLODIPINE BESYLATE 10 MG/1
5 TABLET ORAL DAILY
COMMUNITY
End: 2023-08-16

## 2022-11-19 RX ORDER — DICYCLOMINE HYDROCHLORIDE 20 MG/1
20 TABLET ORAL 2 TIMES DAILY
Qty: 20 TABLET | Refills: 0 | Status: SHIPPED | OUTPATIENT
Start: 2022-11-19 | End: 2022-12-14

## 2022-11-19 RX ADMIN — METRONIDAZOLE 500 MG: 500 TABLET ORAL at 01:11

## 2022-11-19 RX ADMIN — ONDANSETRON HYDROCHLORIDE 4 MG: 2 SOLUTION INTRAMUSCULAR; INTRAVENOUS at 11:11

## 2022-11-19 RX ADMIN — CIPROFLOXACIN 500 MG: 500 TABLET, FILM COATED ORAL at 01:11

## 2022-11-19 RX ADMIN — SODIUM CHLORIDE 1000 ML: 0.9 INJECTION, SOLUTION INTRAVENOUS at 11:11

## 2022-11-19 RX ADMIN — DICYCLOMINE HYDROCHLORIDE 20 MG: 10 CAPSULE ORAL at 11:11

## 2022-11-19 NOTE — DISCHARGE INSTRUCTIONS
Have a bland diet and slowly progress diet as tolerated.  Follow up with family doctor this week.  Return to the ER if symptoms worsen.  Take medication as prescribed.

## 2022-11-19 NOTE — ED PROVIDER NOTES
"Encounter Date: 11/19/2022    SCRIBE #1 NOTE: I, Forest Sr Jr, am scribing for, and in the presence of,  CURT Acosta. I have scribed the following portions of the note - Other sections scribed: HPI, ROS, PE.     History     Chief Complaint   Patient presents with    Abdominal Pain     C/O recently seen x 1 week ago for diarrhea and diagnosed with a stomach virus, reports "it started to get better, but now it's worse, sometimes I can't even make it in time to the bathroom", denies N/V, generalized abdominal pain intermittent     Martha Sevilla is a 66 y.o. female who has a past medical history of anemia, chronic kidney disease, diabetes mellitus, glomerulonephritis, hematuria, hypertension, hyperglycemia, hyperkalemia, hypocalcemia, IgA nephropathy, osteopenia, proteinuria, secondary hyperparathyroidism, steroid-induced osteopenia, and vitamin D deficiency.    The patient presents to the emergency department due to abdominal discomfort; onset yesterday morning. Associated symptoms include: diarrhea & nausea.The patient developed diffuse abdominal discomfort accompanied by intermittent episodes of diarrhea and nausea; symptoms stated to have worsened since onset. Patient took medication for symptoms, states no relief. Due to symptom development, she came to the emergency department for further evaluation.    Patient was seen in the emergency room 1 week ago for similar complaints and was diagnosed with a stomach virus; was prescribed nausea medication without antibiotics. Symptoms were stated to have gradually improved since the diagnosis, but worsened yesterday. She denies blood in stools and vomiting. She denies recent diet change and travel. Patient has no known allergies to medication.  No recent antibiotic use.  Denies any recent hospitalization.  No known sick contacts.  No previous history of diverticulitis or colitis.  No recent EGD or colonoscopy        The history is provided by the " patient.   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     Hematuria     HTN (hypertension) 05/20/2021    Hyperglycemia due to diabetes mellitus     Hyperkalemia     Hypocalcemia     IgA nephropathy     Osteopenia     Proteinuria     Secondary hyperparathyroidism     Steroid-induced osteopenia     Vitamin D deficiency      Past Surgical History:   Procedure Laterality Date    Evacuation of hematoma of leg Right     KNEE ARTHROPLASTY Left 3/18/2019    Procedure: ARTHROPLASTY, KNEE left;  Surgeon: Niraj Pérez MD;  [place] [facility]  Service: Orthopedics;  Laterality: Left;    TUBAL LIGATION       No family history on file.  Social History     Tobacco Use    Smoking status: Never    Smokeless tobacco: Never   Substance Use Topics    Alcohol use: No    Drug use: No     Review of Systems   Constitutional:  Negative for chills and fever.   HENT:  Negative for congestion.    Eyes:  Negative for visual disturbance.   Respiratory:  Negative for shortness of breath.    Cardiovascular:  Negative for chest pain.   Gastrointestinal:  Positive for abdominal pain (Cramping), diarrhea and nausea. Negative for vomiting.        Positive abdominal discomfort.   Genitourinary:  Negative for dysuria and flank pain.   Musculoskeletal:  Negative for myalgias.   Skin:  Negative for rash.   Allergic/Immunologic: Negative for immunocompromised state.   Neurological:  Negative for weakness and numbness.   Hematological:  Does not bruise/bleed easily.   Psychiatric/Behavioral:  Negative for confusion.      Physical Exam     Initial Vitals [11/19/22 1028]   BP Pulse Resp Temp SpO2   134/84 (!) 119 18 98.1 °F (36.7 °C) 98 %      MAP       --         Physical Exam    Vitals  reviewed.  Constitutional: She appears well-developed and well-nourished. She is not diaphoretic. No distress.   HENT:   Head: Normocephalic and atraumatic.   Eyes: Conjunctivae and EOM are normal.   Neck: Neck supple.   Pulmonary/Chest: No respiratory distress.   Abdominal: Abdomen is soft. There is abdominal tenderness (mild diffuse).   Obese abdomen   Musculoskeletal:         General: Normal range of motion.      Cervical back: Neck supple.     Neurological: She is alert and oriented to person, place, and time.   Skin: Skin is warm.       ED Course   Procedures  Labs Reviewed   CBC W/ AUTO DIFFERENTIAL - Abnormal; Notable for the following components:       Result Value    RBC 3.99 (*)     MCH 31.3 (*)     Eos # 3.3 (*)     Lymph % 16.9 (*)     Eosinophil % 28.1 (*)     All other components within normal limits   COMPREHENSIVE METABOLIC PANEL - Abnormal; Notable for the following components:    CO2 19 (*)     Glucose 139 (*)     Creatinine 1.7 (*)     ALT 6 (*)     eGFR 33 (*)     All other components within normal limits   LIPASE - Abnormal; Notable for the following components:    Lipase 61 (*)     All other components within normal limits   CLOSTRIDIUM DIFFICILE   CULTURE, STOOL   ENTEROHEMORRHAGIC E.COLI   OCCULT BLOOD X 1, STOOL   WBC, STOOL   ROTAVIRUS ANTIGEN, STOOL   GIARDIA/CRYPTOSPORIDIUM (EIA)   STOOL EXAM-OVA,CYSTS,PARASITES          Imaging Results              CT Abdomen Pelvis  Without Contrast (Final result)  Result time 11/19/22 12:32:40      Final result by Rasta Padron MD (11/19/22 12:32:40)                   Impression:      1. No acute process or CT findings identified to explain patient's symptoms of nonlocalized abdominal pain on this noncontrast study.  2. Cholelithiasis without acute cholecystitis.  3. Morphologic changes of hepatic cirrhosis with mild splenomegaly suggestive of portal hypertension.  No associated ascites.  4. Grossly stable prominent randee hepatis and retroperitoneal  lymph nodes.  5. Minimal colonic diverticulosis without diverticulitis.  6. Interval mildly increased amount of mural fat deposition from the cecum to the proximal sigmoid colon without adjacent inflammatory change, commonly secondary to remote/chronic infectious or inflammatory process or chronic laxative use.  No bowel obstruction or acute inflammation.  7. Stable few additional findings as above.      Electronically signed by: Rasta Padron MD  Date:    11/19/2022  Time:    12:32               Narrative:    EXAMINATION:  CT ABDOMEN PELVIS WITHOUT CONTRAST    CLINICAL HISTORY:  Abdominal pain, acute, nonlocalized;    TECHNIQUE:  Low dose axial images, sagittal and coronal reformations were obtained from the lung bases to the pubic symphysis.  No contrast media was utilized.    COMPARISON:  Chest radiograph and CT renal stone study 05/20/2021    FINDINGS:  Imaged lung bases show minimal dependent atelectasis and minimal scattered platelike scarring versus atelectasis, without consolidation or pleural effusion.  Base of the heart is normal in size without significant pericardial fluid noting multi-vessel coronary arterial calcifications.    There are at least 2 large calcified gallstones without acute cholecystitis.  No biliary ductal dilatation.    Liver is normal in size with subtle nodular contour similar to prior.  No focal hepatic lesion seen.  Pancreas is mildly atrophic without discrete mass or adjacent inflammatory change.  Spleen is mildly enlarged without focal process seen stomach, duodenum and bilateral adrenal glands are within normal limits.    Bilateral kidneys are normal in overall size and location with mild scattered areas of cortical scarring.  There is bilateral minimal nonspecific perinephric stranding, stable.  No radiodense calculus seen within the collecting systems on either side or urinary bladder.  No hydronephrosis.  Ureters are nondilated.  Urinary bladder is suboptimally distended with  grossly similar mild thickening of the anterior wall with adjacent minimal fat stranding.    No pelvic mass seen.  Scattered pelvic phleboliths and bilateral parametrial calcified vessels noted.    Fatty hypertrophy of the ileocecal valve.  Appendix is not identified; however, no pericecal inflammatory change.  Terminal ileum is within normal limits.  No evidence of bowel obstruction or acute inflammation.  There is interval mildly increased amount of mural fat deposition from the cecum to the proximal sigmoid colon.  No pneumatosis or portal venous gas.   Few scattered colonic diverticula without acute diverticulitis.    Grossly stable prominent randee hepatis and retroperitoneal lymph nodes.  No new or worsening lymphadenopathy seen.    Mild scattered calcific atherosclerosis of the aorta extending into its iliac branches and to a lesser extent mesenteric branches.  Aorta is not aneurysmal.    Remote anterior midline abdominal wall incision.    Osseous structures are stable without acute process seen.                                       Medications   ciprofloxacin HCl tablet 500 mg (has no administration in time range)   metroNIDAZOLE tablet 500 mg (has no administration in time range)   ciprofloxacin HCl tablet 500 mg (has no administration in time range)   sodium chloride 0.9% bolus 1,000 mL (1,000 mLs Intravenous New Bag 11/19/22 1143)   dicyclomine capsule 20 mg (20 mg Oral Given 11/19/22 1156)   ondansetron injection 4 mg (4 mg Intravenous Given 11/19/22 1156)           APC / Resident Notes:   Patient seen in the ER promptly upon arrival.  She is afebrile, no acute distress.  Physical examination reveals mild diffuse tenderness on palpation throughout the abdomen.  Abdomen is soft, nondistended.  Obese abdomen noted.  No CVA tenderness.  IV access established, labs ordered, fluids given.    Patient was able to provide stool sample in the ED, sent off to lab   [place] Attestation:   Scribe #1: I performed the  above scribed service and the documentation accurately describes the services I performed. I attest to the accuracy of the note.      ED Course as of 11/19/22 1300   Sat Nov 19, 2022   1142 Creatinine(!): 1.7  Similar to baseline history of CKD [AJ]   1142 CO2(!): 19  Likely due to diarrhea will hydrate with IV fluids [AJ]   1142 Normal white count of 11.6, hemoglobin stable [AJ]   1142 Lipase slightly elevated to 61 [AJ]   1142 Patient was given Bentyl And Zofran in the ED. [AJ]   1259 CT is unremarkable.  No acute evidence of colitis or diverticulitis.  No inflammatory process. [AJ]   1259 On reassessment patient is resting comfortably.  In the persistence of patient's symptoms that has been now ongoing for over 1 week, will cover with ciprofloxacin and Flagyl.  Will advise patient to only take antidiarrheals if she is having multiple episodes throughout the night.  Will prescribed home on Bentyl.  She was advised to stay hydrated.  Advised to have a bland diet and slowly progress diet as tolerated.  She was instructed to follow up with primary care doctor in the next 2-3 days.  Patient is otherwise stable for discharge and close follow-up.    Disclaimer: This note has been generated using voice-recognition software. There may be typographical errors that have been missed during proof-reading.     [AJ]      ED Course User Index  [AJ] Dot Lopez PA-C                 IDot personally performed the services described in this documentation. All medical record entries made by the scribe were at my direction and in my presence.  I have reviewed the chart and agree that the record reflects my personal performance and is accurate and complete. Dot Lopez PA-C  1:01 PM 11/19/2022    Clinical Impression:   Final diagnoses:  [R19.7] Diarrhea of presumed infectious origin (Primary)      ED Disposition Condition    Discharge Stable          ED Prescriptions       Medication Sig Dispense Start Date  End Date Auth. Provider    metroNIDAZOLE (FLAGYL) 500 MG tablet Take 1 tablet (500 mg total) by mouth 3 (three) times daily. for 7 days 21 tablet 11/19/2022 11/26/2022 Dot Lopez PA-C    dicyclomine (BENTYL) 20 mg tablet Take 1 tablet (20 mg total) by mouth 2 (two) times daily. 20 tablet 11/19/2022 12/19/2022 Dot Lopez PA-C          Follow-up Information       Follow up With Specialties Details Why Contact Info    Zunilda Dickson MD Internal Medicine   [address] [place], [place] [address]  [address]  [facility] [address]  [phone]               Dot Lopez PA-C  11/19/22 7577

## 2022-11-19 NOTE — ED NOTES
Review of patient's allergies indicates:  No Known Allergies     1st Point of contact with pt.    Patient has verified the spelling of their name and  on armband.   APPEARANCE: Patient is alert, calm, oriented x 4, and does not appear distressed.  SKIN: Skin is normal for race, warm, and dry. Normal skin turgor and mucous membranes moist.  CARDIAC: Normal rate and rhythm, no murmur heard. Denies CP.  RESPIRATORY:Normal rate and effort. Breath sounds clear bilaterally throughout chest. Respirations are equal and unlabored. Denies SOB.   GASTRO: Bowel sounds normal, abdomen is soft, no tenderness, and no abdominal distention. +tenderness to RLQ/LLQ, NVD x 2wks, states went to another hospital and was dx with a stomach virus and given tx for vomiting; recently dx with diabetes, denies taking medication, denies increased thirst, dizziness, increased urination.  PERIPHERAL VASCULAR: peripheral pulses present. Normal cap refill. No edema. Warm to touch.  NEURO: 5/5 strength major flexors/extensors bilaterally. Sensory intact to light touch bilaterally. Juvenal coma scale: eyes open spontaneously-4, oriented & converses-5, obeys commands-6. No neurological abnormalities.   : Voids without complication. Denies hematuria.

## 2022-11-19 NOTE — PHARMACY MED REC
"      [facility]           Pharmacy  Admission Medication History     The home medication history was taken by Christiane Johnston.      Medication history obtained from Medications listed below were obtained from: Patient/family    Based on information gathered for medication list, you may go to "Admission" then "Reconcile Home Medications" tabs to review and/or act upon those items.     The home medication list has been updated by the Pharmacy department.   Please read ALL comments highlighted in yellow.   Please address this information as you see fit.    Feel free to contact us if you have any questions or require assistance.    The medications listed below were removed from the home medication list.  Please reorder if appropriate:    Patient reports NOT TAKING the following medication(s):  Amoxil 875mg  Tessalon 100mg        No current facility-administered medications on file prior to encounter.     Current Outpatient Medications on File Prior to Encounter   Medication Sig Dispense Refill    allopurinoL (ZYLOPRIM) 100 MG tablet Take 1 tablet (100 mg total) by mouth once daily. 90 tablet 3    amitriptyline (ELAVIL) 50 MG tablet Take 50 mg by mouth every evening.      amLODIPine (NORVASC) 10 MG tablet Take 5 mg by mouth once daily.      atorvastatin (LIPITOR) 10 MG tablet Take 10 mg by mouth every evening.      ergocalciferol (ERGOCALCIFEROL) 50,000 unit Cap Take 50,000 Units by mouth every Wednesday.      furosemide (LASIX) 40 MG tablet Take 1 tablet (40 mg total) by mouth once daily. 1 to two tabs daily for swelling 90 tablet 3    lisinopriL (PRINIVIL,ZESTRIL) 40 MG tablet Take 1 tablet (40 mg total) by mouth once daily. 90 tablet 3    metFORMIN (GLUCOPHAGE) 1000 MG tablet Take 1 tablet (1,000 mg total) by mouth 2 (two) times daily with meals. 180 tablet 3    metoprolol succinate (TOPROL-XL) 100 MG 24 hr tablet Take 1 tablet (100 mg total) by mouth once daily. 90 tablet 3    nystatin " "(MYCOSTATIN) powder Apply topically 2 (two) times daily. Under breasts 60 g 3    omega 3-dha-epa-fish oil (FISH OIL) 1,200 (144-216) mg Cap Take 1 capsule by mouth once daily. 30 capsule 3    ondansetron (ZOFRAN-ODT) 4 MG TbDL Take 1 tablet (4 mg total) by mouth 2 (two) times daily. 30 tablet 0    patiromer calcium sorbitex (VELTASSA) 8.4 gram PwPk Take 1 packet (8.4 g total) by mouth once daily. 30 packet 3    sodium zirconium cyclosilicate (LOKELMA) 10 gram packet Take 1 packet (10 g total) by mouth every Mon, Wed, Fri. Mix entire contents of packet(s) into drinking glass containing 3 tablespoons of water; stir well and drink immediately. Add water and repeat until no powder remains to receive entire dose. 13 packet 11    BD ULTRA-FINE MINI PEN NEEDLE 31 gauge x 3/16" Ndle Inject into the skin.      denosumab (PROLIA) 60 mg/mL Syrg Inject 1 mL (60 mg total) into the skin every 6 (six) months. (Patient not taking: Reported on 11/19/2022) 2 mL 4    semaglutide (OZEMPIC) 0.25 mg or 0.5 mg(2 mg/1.5 mL) pen injector Inject 0.25 mg into the skin every 7 days. (Patient not taking: Reported on 9/7/2022) 1 pen 5       Please address this information as you see fit.  Feel free to contact us if you have any questions or require assistance.    Christiane Johnston  [phone]            .        "

## 2022-11-20 LAB
C DIFF GDH STL QL: NEGATIVE
C DIFF TOX A+B STL QL IA: NEGATIVE
CRYPTOSP AG STL QL IA: NEGATIVE
G LAMBLIA AG STL QL IA: NEGATIVE

## 2022-11-21 LAB — RV AG STL QL IA.RAPID: NEGATIVE

## 2022-11-22 LAB
E COLI SXT1 STL QL IA: NEGATIVE
E COLI SXT2 STL QL IA: NEGATIVE

## 2022-11-23 LAB
BACTERIA STL CULT: NORMAL
O+P STL MICRO: NORMAL

## 2022-12-12 ENCOUNTER — LAB VISIT (OUTPATIENT)
Dept: LAB | Facility: HOSPITAL | Age: 67
End: 2022-12-12
Attending: INTERNAL MEDICINE
Payer: MEDICARE

## 2022-12-12 DIAGNOSIS — N18.4 ANEMIA IN STAGE 4 CHRONIC KIDNEY DISEASE: ICD-10-CM

## 2022-12-12 DIAGNOSIS — D63.1 ANEMIA IN STAGE 4 CHRONIC KIDNEY DISEASE: ICD-10-CM

## 2022-12-12 DIAGNOSIS — N18.4 CKD (CHRONIC KIDNEY DISEASE) STAGE 4, GFR 15-29 ML/MIN: ICD-10-CM

## 2022-12-12 LAB
ALBUMIN SERPL BCP-MCNC: 4.2 G/DL (ref 3.5–5.2)
ANION GAP SERPL CALC-SCNC: 8 MMOL/L (ref 8–16)
BASOPHILS # BLD AUTO: 0.04 K/UL (ref 0–0.2)
BASOPHILS NFR BLD: 0.5 % (ref 0–1.9)
CALCIUM SERPL-MCNC: 9.3 MG/DL (ref 8.7–10.5)
CHLORIDE SERPL-SCNC: 102 MMOL/L (ref 95–110)
CO2 SERPL-SCNC: 29 MMOL/L (ref 23–29)
CREAT SERPL-MCNC: 1.65 MG/DL (ref 0.5–1.4)
DIFFERENTIAL METHOD: ABNORMAL
EOSINOPHIL # BLD AUTO: 0.3 K/UL (ref 0–0.5)
EOSINOPHIL NFR BLD: 4.6 % (ref 0–8)
ERYTHROCYTE [DISTWIDTH] IN BLOOD BY AUTOMATED COUNT: 12.3 % (ref 11.5–14.5)
EST. GFR  (NO RACE VARIABLE): 34.3 ML/MIN/1.73 M^2
FERRITIN SERPL-MCNC: 149 NG/ML (ref 20–300)
GLUCOSE SERPL-MCNC: 174 MG/DL (ref 70–110)
HCT VFR BLD AUTO: 37.5 % (ref 37–48.5)
HGB BLD-MCNC: 12 G/DL (ref 12–16)
IMM GRANULOCYTES # BLD AUTO: 0.02 K/UL (ref 0–0.04)
IMM GRANULOCYTES NFR BLD AUTO: 0.3 % (ref 0–0.5)
IRON SERPL-MCNC: 59 UG/DL (ref 30–160)
LYMPHOCYTES # BLD AUTO: 1.5 K/UL (ref 1–4.8)
LYMPHOCYTES NFR BLD: 20.5 % (ref 18–48)
MCH RBC QN AUTO: 30.6 PG (ref 27–31)
MCHC RBC AUTO-ENTMCNC: 32 G/DL (ref 32–36)
MCV RBC AUTO: 96 FL (ref 82–98)
MONOCYTES # BLD AUTO: 0.7 K/UL (ref 0.3–1)
MONOCYTES NFR BLD: 8.7 % (ref 4–15)
NEUTROPHILS # BLD AUTO: 4.9 K/UL (ref 1.8–7.7)
NEUTROPHILS NFR BLD: 65.4 % (ref 38–73)
NRBC BLD-RTO: 0 /100 WBC
PHOSPHATE SERPL-MCNC: 3.8 MG/DL (ref 2.7–4.5)
PLATELET # BLD AUTO: 295 K/UL (ref 150–450)
PMV BLD AUTO: 10.5 FL (ref 9.2–12.9)
POTASSIUM SERPL-SCNC: 4.7 MMOL/L (ref 3.5–5.1)
PTH-INTACT SERPL-MCNC: 87.1 PG/ML (ref 9–77)
RBC # BLD AUTO: 3.92 M/UL (ref 4–5.4)
SATURATED IRON: 21 % (ref 20–50)
SODIUM SERPL-SCNC: 139 MMOL/L (ref 136–145)
TOTAL IRON BINDING CAPACITY: 283 UG/DL (ref 250–450)
TRANSFERRIN SERPL-MCNC: 191 MG/DL (ref 200–375)
UUN UR-MCNC: 21 MG/DL (ref 7–17)
WBC # BLD AUTO: 7.47 K/UL (ref 3.9–12.7)

## 2022-12-12 PROCEDURE — 84466 ASSAY OF TRANSFERRIN: CPT | Mod: PO | Performed by: INTERNAL MEDICINE

## 2022-12-12 PROCEDURE — 36415 COLL VENOUS BLD VENIPUNCTURE: CPT | Mod: PO | Performed by: INTERNAL MEDICINE

## 2022-12-12 PROCEDURE — 82728 ASSAY OF FERRITIN: CPT | Performed by: INTERNAL MEDICINE

## 2022-12-12 PROCEDURE — 80069 RENAL FUNCTION PANEL: CPT | Mod: PO | Performed by: INTERNAL MEDICINE

## 2022-12-12 PROCEDURE — 83970 ASSAY OF PARATHORMONE: CPT | Mod: PO | Performed by: INTERNAL MEDICINE

## 2022-12-12 PROCEDURE — 85025 COMPLETE CBC W/AUTO DIFF WBC: CPT | Mod: PO | Performed by: INTERNAL MEDICINE

## 2022-12-12 PROCEDURE — 84550 ASSAY OF BLOOD/URIC ACID: CPT | Performed by: INTERNAL MEDICINE

## 2022-12-13 LAB — URATE SERPL-MCNC: 7.9 MG/DL (ref 2.4–5.7)

## 2022-12-14 ENCOUNTER — LAB VISIT (OUTPATIENT)
Dept: LAB | Facility: HOSPITAL | Age: 67
End: 2022-12-14
Attending: INTERNAL MEDICINE
Payer: MEDICAID

## 2022-12-14 DIAGNOSIS — R80.1 PERSISTENT PROTEINURIA: ICD-10-CM

## 2022-12-14 DIAGNOSIS — R82.90 URINE ABNORMALITY: ICD-10-CM

## 2022-12-14 DIAGNOSIS — R31.9 HEMATURIA, UNSPECIFIED TYPE: ICD-10-CM

## 2022-12-14 DIAGNOSIS — N39.0 UTI (URINARY TRACT INFECTION), UNCOMPLICATED: ICD-10-CM

## 2022-12-14 DIAGNOSIS — N18.4 CKD (CHRONIC KIDNEY DISEASE) STAGE 4, GFR 15-29 ML/MIN: ICD-10-CM

## 2022-12-14 LAB
BILIRUB UR QL STRIP: NEGATIVE
CLARITY UR REFRACT.AUTO: CLEAR
COLOR UR AUTO: YELLOW
CREAT UR-MCNC: 51.9 MG/DL (ref 15–325)
GLUCOSE UR QL STRIP: NEGATIVE
HGB UR QL STRIP: ABNORMAL
KETONES UR QL STRIP: NEGATIVE
LEUKOCYTE ESTERASE UR QL STRIP: NEGATIVE
NITRITE UR QL STRIP: NEGATIVE
PH UR STRIP: 6 [PH] (ref 5–8)
PROT UR QL STRIP: NEGATIVE
PROT UR-MCNC: <7 MG/DL (ref 0–15)
PROT/CREAT UR: NORMAL MG/G{CREAT} (ref 0–0.2)
SP GR UR STRIP: 1.02 (ref 1–1.03)
URN SPEC COLLECT METH UR: ABNORMAL
UROBILINOGEN UR STRIP-ACNC: NEGATIVE EU/DL

## 2022-12-14 PROCEDURE — 84156 ASSAY OF PROTEIN URINE: CPT | Performed by: INTERNAL MEDICINE

## 2022-12-14 PROCEDURE — 81003 URINALYSIS AUTO W/O SCOPE: CPT | Mod: PO | Performed by: INTERNAL MEDICINE

## 2023-02-15 ENCOUNTER — LAB VISIT (OUTPATIENT)
Dept: LAB | Facility: HOSPITAL | Age: 68
End: 2023-02-15
Attending: INTERNAL MEDICINE
Payer: MEDICARE

## 2023-02-15 DIAGNOSIS — T38.0X5D STEROID-INDUCED DIABETES MELLITUS, SUBSEQUENT ENCOUNTER: ICD-10-CM

## 2023-02-15 DIAGNOSIS — N18.4 CKD (CHRONIC KIDNEY DISEASE) STAGE 4, GFR 15-29 ML/MIN: ICD-10-CM

## 2023-02-15 DIAGNOSIS — N18.4 ANEMIA IN STAGE 4 CHRONIC KIDNEY DISEASE: ICD-10-CM

## 2023-02-15 DIAGNOSIS — E55.9 VITAMIN D DEFICIENCY: ICD-10-CM

## 2023-02-15 DIAGNOSIS — N25.81 SECONDARY HYPERPARATHYROIDISM OF RENAL ORIGIN: ICD-10-CM

## 2023-02-15 DIAGNOSIS — D63.1 ANEMIA IN STAGE 4 CHRONIC KIDNEY DISEASE: ICD-10-CM

## 2023-02-15 DIAGNOSIS — E09.9 STEROID-INDUCED DIABETES MELLITUS, SUBSEQUENT ENCOUNTER: ICD-10-CM

## 2023-02-15 LAB
ALBUMIN SERPL BCP-MCNC: 4.2 G/DL (ref 3.5–5.2)
ANION GAP SERPL CALC-SCNC: 9 MMOL/L (ref 8–16)
BASOPHILS # BLD AUTO: 0.06 K/UL (ref 0–0.2)
BASOPHILS NFR BLD: 0.6 % (ref 0–1.9)
BILIRUB UR QL STRIP: NEGATIVE
CALCIUM SERPL-MCNC: 9.1 MG/DL (ref 8.7–10.5)
CHLORIDE SERPL-SCNC: 107 MMOL/L (ref 95–110)
CLARITY UR REFRACT.AUTO: CLEAR
CO2 SERPL-SCNC: 24 MMOL/L (ref 23–29)
COLOR UR AUTO: YELLOW
CREAT SERPL-MCNC: 1.56 MG/DL (ref 0.5–1.4)
CREAT UR-MCNC: 232.2 MG/DL (ref 15–325)
DIFFERENTIAL METHOD: ABNORMAL
EOSINOPHIL # BLD AUTO: 0.4 K/UL (ref 0–0.5)
EOSINOPHIL NFR BLD: 4.5 % (ref 0–8)
ERYTHROCYTE [DISTWIDTH] IN BLOOD BY AUTOMATED COUNT: 13.1 % (ref 11.5–14.5)
EST. GFR  (NO RACE VARIABLE): 36.7 ML/MIN/1.73 M^2
ESTIMATED AVG GLUCOSE: 126 MG/DL (ref 68–131)
FERRITIN SERPL-MCNC: 149 NG/ML (ref 20–300)
GLUCOSE SERPL-MCNC: 111 MG/DL (ref 70–110)
GLUCOSE UR QL STRIP: NEGATIVE
HBA1C MFR BLD: 6 % (ref 4–5.6)
HCT VFR BLD AUTO: 34.6 % (ref 37–48.5)
HGB BLD-MCNC: 11.3 G/DL (ref 12–16)
HGB UR QL STRIP: ABNORMAL
IMM GRANULOCYTES # BLD AUTO: 0.04 K/UL (ref 0–0.04)
IMM GRANULOCYTES NFR BLD AUTO: 0.4 % (ref 0–0.5)
IRON SERPL-MCNC: 54 UG/DL (ref 30–160)
KETONES UR QL STRIP: NEGATIVE
LEUKOCYTE ESTERASE UR QL STRIP: NEGATIVE
LYMPHOCYTES # BLD AUTO: 2.8 K/UL (ref 1–4.8)
LYMPHOCYTES NFR BLD: 29.5 % (ref 18–48)
MCH RBC QN AUTO: 31 PG (ref 27–31)
MCHC RBC AUTO-ENTMCNC: 32.7 G/DL (ref 32–36)
MCV RBC AUTO: 95 FL (ref 82–98)
MONOCYTES # BLD AUTO: 0.5 K/UL (ref 0.3–1)
MONOCYTES NFR BLD: 5.6 % (ref 4–15)
NEUTROPHILS # BLD AUTO: 5.6 K/UL (ref 1.8–7.7)
NEUTROPHILS NFR BLD: 59.4 % (ref 38–73)
NITRITE UR QL STRIP: NEGATIVE
NRBC BLD-RTO: 0 /100 WBC
PH UR STRIP: 5 [PH] (ref 5–8)
PHOSPHATE SERPL-MCNC: 4.8 MG/DL (ref 2.7–4.5)
PLATELET # BLD AUTO: 259 K/UL (ref 150–450)
PMV BLD AUTO: 10.1 FL (ref 9.2–12.9)
POTASSIUM SERPL-SCNC: 5 MMOL/L (ref 3.5–5.1)
PROT UR QL STRIP: NEGATIVE
PROT UR-MCNC: 13 MG/DL (ref 0–15)
PROT/CREAT UR: 0.06 MG/G{CREAT} (ref 0–0.2)
PTH-INTACT SERPL-MCNC: 93.6 PG/ML (ref 9–77)
RBC # BLD AUTO: 3.65 M/UL (ref 4–5.4)
SATURATED IRON: 18 % (ref 20–50)
SODIUM SERPL-SCNC: 140 MMOL/L (ref 136–145)
SP GR UR STRIP: >=1.03 (ref 1–1.03)
TOTAL IRON BINDING CAPACITY: 299 UG/DL (ref 250–450)
TRANSFERRIN SERPL-MCNC: 202 MG/DL (ref 200–375)
URATE SERPL-MCNC: 6.6 MG/DL (ref 2.4–5.7)
URN SPEC COLLECT METH UR: ABNORMAL
UROBILINOGEN UR STRIP-ACNC: NEGATIVE EU/DL
UUN UR-MCNC: 23 MG/DL (ref 7–17)
WBC # BLD AUTO: 9.35 K/UL (ref 3.9–12.7)

## 2023-02-15 PROCEDURE — 81003 URINALYSIS AUTO W/O SCOPE: CPT | Mod: PO | Performed by: INTERNAL MEDICINE

## 2023-02-15 PROCEDURE — 83036 HEMOGLOBIN GLYCOSYLATED A1C: CPT | Mod: PO | Performed by: INTERNAL MEDICINE

## 2023-02-15 PROCEDURE — 82728 ASSAY OF FERRITIN: CPT | Performed by: INTERNAL MEDICINE

## 2023-02-15 PROCEDURE — 80069 RENAL FUNCTION PANEL: CPT | Mod: PO | Performed by: INTERNAL MEDICINE

## 2023-02-15 PROCEDURE — 36415 COLL VENOUS BLD VENIPUNCTURE: CPT | Mod: PO | Performed by: INTERNAL MEDICINE

## 2023-02-15 PROCEDURE — 85025 COMPLETE CBC W/AUTO DIFF WBC: CPT | Mod: PO | Performed by: INTERNAL MEDICINE

## 2023-02-15 PROCEDURE — 84550 ASSAY OF BLOOD/URIC ACID: CPT | Mod: PO | Performed by: INTERNAL MEDICINE

## 2023-02-15 PROCEDURE — 83970 ASSAY OF PARATHORMONE: CPT | Mod: PO | Performed by: INTERNAL MEDICINE

## 2023-02-15 PROCEDURE — 84156 ASSAY OF PROTEIN URINE: CPT | Performed by: INTERNAL MEDICINE

## 2023-02-15 PROCEDURE — 84466 ASSAY OF TRANSFERRIN: CPT | Mod: PO | Performed by: INTERNAL MEDICINE

## 2023-05-16 ENCOUNTER — LAB VISIT (OUTPATIENT)
Dept: LAB | Facility: HOSPITAL | Age: 68
End: 2023-05-16
Attending: INTERNAL MEDICINE
Payer: MEDICARE

## 2023-05-16 DIAGNOSIS — N18.4 CKD (CHRONIC KIDNEY DISEASE) STAGE 4, GFR 15-29 ML/MIN: ICD-10-CM

## 2023-05-16 DIAGNOSIS — E55.9 VITAMIN D DEFICIENCY: ICD-10-CM

## 2023-05-16 DIAGNOSIS — N25.81 SECONDARY HYPERPARATHYROIDISM OF RENAL ORIGIN: ICD-10-CM

## 2023-05-16 DIAGNOSIS — R73.9 HYPERGLYCEMIA: ICD-10-CM

## 2023-05-16 LAB
25(OH)D3+25(OH)D2 SERPL-MCNC: 24 NG/ML (ref 30–96)
ALBUMIN SERPL BCP-MCNC: 4.2 G/DL (ref 3.5–5.2)
ANION GAP SERPL CALC-SCNC: 9 MMOL/L (ref 8–16)
BASOPHILS # BLD AUTO: 0.08 K/UL (ref 0–0.2)
BASOPHILS NFR BLD: 0.9 % (ref 0–1.9)
CALCIUM SERPL-MCNC: 9 MG/DL (ref 8.7–10.5)
CHLORIDE SERPL-SCNC: 104 MMOL/L (ref 95–110)
CO2 SERPL-SCNC: 27 MMOL/L (ref 23–29)
CREAT SERPL-MCNC: 1.97 MG/DL (ref 0.5–1.4)
DIFFERENTIAL METHOD: ABNORMAL
EOSINOPHIL # BLD AUTO: 1.6 K/UL (ref 0–0.5)
EOSINOPHIL NFR BLD: 17.7 % (ref 0–8)
ERYTHROCYTE [DISTWIDTH] IN BLOOD BY AUTOMATED COUNT: 12.8 % (ref 11.5–14.5)
EST. GFR  (NO RACE VARIABLE): 27.7 ML/MIN/1.73 M^2
ESTIMATED AVG GLUCOSE: 111 MG/DL (ref 68–131)
GLUCOSE SERPL-MCNC: 130 MG/DL (ref 70–110)
HBA1C MFR BLD: 5.5 % (ref 4–5.6)
HCT VFR BLD AUTO: 35.3 % (ref 37–48.5)
HGB BLD-MCNC: 11.4 G/DL (ref 12–16)
IMM GRANULOCYTES # BLD AUTO: 0.02 K/UL (ref 0–0.04)
IMM GRANULOCYTES NFR BLD AUTO: 0.2 % (ref 0–0.5)
LYMPHOCYTES # BLD AUTO: 1.9 K/UL (ref 1–4.8)
LYMPHOCYTES NFR BLD: 21.6 % (ref 18–48)
MCH RBC QN AUTO: 31.2 PG (ref 27–31)
MCHC RBC AUTO-ENTMCNC: 32.3 G/DL (ref 32–36)
MCV RBC AUTO: 97 FL (ref 82–98)
MONOCYTES # BLD AUTO: 0.5 K/UL (ref 0.3–1)
MONOCYTES NFR BLD: 6 % (ref 4–15)
NEUTROPHILS # BLD AUTO: 4.8 K/UL (ref 1.8–7.7)
NEUTROPHILS NFR BLD: 53.6 % (ref 38–73)
NRBC BLD-RTO: 0 /100 WBC
PHOSPHATE SERPL-MCNC: 4.7 MG/DL (ref 2.7–4.5)
PLATELET # BLD AUTO: 230 K/UL (ref 150–450)
PMV BLD AUTO: 10.6 FL (ref 9.2–12.9)
POTASSIUM SERPL-SCNC: 4.9 MMOL/L (ref 3.5–5.1)
PTH-INTACT SERPL-MCNC: 130.2 PG/ML (ref 9–77)
RBC # BLD AUTO: 3.65 M/UL (ref 4–5.4)
SODIUM SERPL-SCNC: 140 MMOL/L (ref 136–145)
URATE SERPL-MCNC: 7 MG/DL (ref 2.4–5.7)
UUN UR-MCNC: 20 MG/DL (ref 7–17)
WBC # BLD AUTO: 8.97 K/UL (ref 3.9–12.7)

## 2023-05-16 PROCEDURE — 84550 ASSAY OF BLOOD/URIC ACID: CPT | Performed by: INTERNAL MEDICINE

## 2023-05-16 PROCEDURE — 80069 RENAL FUNCTION PANEL: CPT | Mod: PO | Performed by: INTERNAL MEDICINE

## 2023-05-16 PROCEDURE — 83970 ASSAY OF PARATHORMONE: CPT | Mod: PO | Performed by: INTERNAL MEDICINE

## 2023-05-16 PROCEDURE — 85025 COMPLETE CBC W/AUTO DIFF WBC: CPT | Mod: PO | Performed by: INTERNAL MEDICINE

## 2023-05-16 PROCEDURE — 82306 VITAMIN D 25 HYDROXY: CPT | Mod: PO | Performed by: INTERNAL MEDICINE

## 2023-05-16 PROCEDURE — 83036 HEMOGLOBIN GLYCOSYLATED A1C: CPT | Performed by: INTERNAL MEDICINE

## 2023-05-16 PROCEDURE — 36415 COLL VENOUS BLD VENIPUNCTURE: CPT | Mod: PO | Performed by: INTERNAL MEDICINE

## 2023-08-15 ENCOUNTER — LAB VISIT (OUTPATIENT)
Dept: LAB | Facility: HOSPITAL | Age: 68
End: 2023-08-15
Attending: INTERNAL MEDICINE
Payer: MEDICARE

## 2023-08-15 DIAGNOSIS — N18.4 CKD (CHRONIC KIDNEY DISEASE) STAGE 4, GFR 15-29 ML/MIN: ICD-10-CM

## 2023-09-10 ENCOUNTER — HOSPITAL ENCOUNTER (OUTPATIENT)
Facility: HOSPITAL | Age: 68
Discharge: HOME | End: 2023-09-12
Attending: EMERGENCY MEDICINE | Admitting: EMERGENCY MEDICINE
Payer: MEDICARE

## 2023-09-10 DIAGNOSIS — R47.9 DIFFICULTY WITH SPEECH: ICD-10-CM

## 2023-09-10 DIAGNOSIS — G45.9 TIA (TRANSIENT ISCHEMIC ATTACK): Primary | ICD-10-CM

## 2023-09-10 DIAGNOSIS — I49.9 ARRHYTHMIA: ICD-10-CM

## 2023-09-10 LAB
ALBUMIN SERPL BCP-MCNC: 4.2 G/DL (ref 3.5–5.2)
ALP SERPL-CCNC: 73 U/L (ref 38–126)
ALT SERPL W/O P-5'-P-CCNC: 13 U/L (ref 10–44)
ANION GAP SERPL CALC-SCNC: 13 MMOL/L (ref 8–16)
AST SERPL-CCNC: 24 U/L (ref 15–46)
BASOPHILS # BLD AUTO: 0.07 K/UL (ref 0–0.2)
BASOPHILS NFR BLD: 0.7 % (ref 0–1.9)
BILIRUB SERPL-MCNC: 0.5 MG/DL (ref 0.1–1)
CALCIUM SERPL-MCNC: 9.4 MG/DL (ref 8.7–10.5)
CHLORIDE SERPL-SCNC: 104 MMOL/L (ref 95–110)
CO2 SERPL-SCNC: 21 MMOL/L (ref 23–29)
CREAT SERPL-MCNC: 2.55 MG/DL (ref 0.5–1.4)
DIFFERENTIAL METHOD: ABNORMAL
EOSINOPHIL # BLD AUTO: 0.6 K/UL (ref 0–0.5)
EOSINOPHIL NFR BLD: 6.1 % (ref 0–8)
ERYTHROCYTE [DISTWIDTH] IN BLOOD BY AUTOMATED COUNT: 13 % (ref 11.5–14.5)
EST. GFR  (NO RACE VARIABLE): 20.3 ML/MIN/1.73 M^2
GLUCOSE SERPL-MCNC: 124 MG/DL (ref 70–110)
HCT VFR BLD AUTO: 35.7 % (ref 37–48.5)
HGB BLD-MCNC: 11.7 G/DL (ref 12–16)
IMM GRANULOCYTES # BLD AUTO: 0.02 K/UL (ref 0–0.04)
IMM GRANULOCYTES NFR BLD AUTO: 0.2 % (ref 0–0.5)
LYMPHOCYTES # BLD AUTO: 2.5 K/UL (ref 1–4.8)
LYMPHOCYTES NFR BLD: 24.7 % (ref 18–48)
MCH RBC QN AUTO: 31.4 PG (ref 27–31)
MCHC RBC AUTO-ENTMCNC: 32.8 G/DL (ref 32–36)
MCV RBC AUTO: 96 FL (ref 82–98)
MONOCYTES # BLD AUTO: 0.7 K/UL (ref 0.3–1)
MONOCYTES NFR BLD: 7.2 % (ref 4–15)
NEUTROPHILS # BLD AUTO: 6.2 K/UL (ref 1.8–7.7)
NEUTROPHILS NFR BLD: 61.1 % (ref 38–73)
NRBC BLD-RTO: 0 /100 WBC
PLATELET # BLD AUTO: 223 K/UL (ref 150–450)
PMV BLD AUTO: 10.5 FL (ref 9.2–12.9)
POTASSIUM SERPL-SCNC: 5.2 MMOL/L (ref 3.5–5.1)
PROT SERPL-MCNC: 8.2 G/DL (ref 6–8.4)
RBC # BLD AUTO: 3.73 M/UL (ref 4–5.4)
SARS-COV-2 RDRP RESP QL NAA+PROBE: NEGATIVE
SODIUM SERPL-SCNC: 138 MMOL/L (ref 136–145)
TROPONIN I SERPL-MCNC: <0.012 NG/ML (ref 0.01–0.03)
UUN UR-MCNC: 27 MG/DL (ref 7–17)
WBC # BLD AUTO: 10.13 K/UL (ref 3.9–12.7)

## 2023-09-10 PROCEDURE — 93005 ELECTROCARDIOGRAM TRACING: CPT | Mod: ER

## 2023-09-10 PROCEDURE — 93010 EKG 12-LEAD: ICD-10-PCS | Mod: ,,, | Performed by: INTERNAL MEDICINE

## 2023-09-10 PROCEDURE — 94760 N-INVAS EAR/PLS OXIMETRY 1: CPT | Mod: ER

## 2023-09-10 PROCEDURE — 93010 ELECTROCARDIOGRAM REPORT: CPT | Mod: ,,, | Performed by: INTERNAL MEDICINE

## 2023-09-10 PROCEDURE — 80053 COMPREHEN METABOLIC PANEL: CPT | Mod: ER | Performed by: EMERGENCY MEDICINE

## 2023-09-10 PROCEDURE — G0378 HOSPITAL OBSERVATION PER HR: HCPCS

## 2023-09-10 PROCEDURE — 99285 EMERGENCY DEPT VISIT HI MDM: CPT | Mod: 25,ER

## 2023-09-10 PROCEDURE — U0002 COVID-19 LAB TEST NON-CDC: HCPCS | Mod: ER | Performed by: EMERGENCY MEDICINE

## 2023-09-10 PROCEDURE — 84484 ASSAY OF TROPONIN QUANT: CPT | Mod: ER | Performed by: EMERGENCY MEDICINE

## 2023-09-10 PROCEDURE — 85025 COMPLETE CBC W/AUTO DIFF WBC: CPT | Mod: ER | Performed by: EMERGENCY MEDICINE

## 2023-09-10 PROCEDURE — 99900035 HC TECH TIME PER 15 MIN (STAT): Mod: ER

## 2023-09-10 RX ORDER — ENOXAPARIN SODIUM 100 MG/ML
30 INJECTION SUBCUTANEOUS EVERY 24 HOURS
Status: DISCONTINUED | OUTPATIENT
Start: 2023-09-11 | End: 2023-09-12

## 2023-09-10 RX ORDER — ATORVASTATIN CALCIUM 40 MG/1
40 TABLET, FILM COATED ORAL NIGHTLY
Status: DISCONTINUED | OUTPATIENT
Start: 2023-09-11 | End: 2023-09-11

## 2023-09-10 RX ORDER — AMITRIPTYLINE HYDROCHLORIDE 25 MG/1
50 TABLET, FILM COATED ORAL NIGHTLY
Status: DISCONTINUED | OUTPATIENT
Start: 2023-09-11 | End: 2023-09-12 | Stop reason: HOSPADM

## 2023-09-10 RX ORDER — TALC
6 POWDER (GRAM) TOPICAL NIGHTLY PRN
Status: DISCONTINUED | OUTPATIENT
Start: 2023-09-11 | End: 2023-09-12 | Stop reason: HOSPADM

## 2023-09-10 RX ORDER — POLYETHYLENE GLYCOL 3350 17 G/17G
17 POWDER, FOR SOLUTION ORAL DAILY
Status: DISCONTINUED | OUTPATIENT
Start: 2023-09-11 | End: 2023-09-12 | Stop reason: HOSPADM

## 2023-09-10 RX ORDER — AMLODIPINE BESYLATE 5 MG/1
10 TABLET ORAL DAILY
Status: DISCONTINUED | OUTPATIENT
Start: 2023-09-11 | End: 2023-09-12 | Stop reason: HOSPADM

## 2023-09-10 RX ORDER — METOPROLOL SUCCINATE 50 MG/1
100 TABLET, EXTENDED RELEASE ORAL DAILY
Status: DISCONTINUED | OUTPATIENT
Start: 2023-09-11 | End: 2023-09-12 | Stop reason: HOSPADM

## 2023-09-10 RX ORDER — LISINOPRIL 20 MG/1
40 TABLET ORAL DAILY
Status: DISCONTINUED | OUTPATIENT
Start: 2023-09-11 | End: 2023-09-11

## 2023-09-11 ENCOUNTER — ANESTHESIA EVENT (OUTPATIENT)
Dept: CARDIOLOGY | Facility: HOSPITAL | Age: 68
End: 2023-09-11
Payer: MEDICARE

## 2023-09-11 ENCOUNTER — ANESTHESIA (OUTPATIENT)
Dept: CARDIOLOGY | Facility: HOSPITAL | Age: 68
End: 2023-09-11
Payer: MEDICARE

## 2023-09-11 LAB
ALBUMIN SERPL BCP-MCNC: 3.7 G/DL (ref 3.5–5.2)
ALP SERPL-CCNC: 75 U/L (ref 55–135)
ALT SERPL W/O P-5'-P-CCNC: 8 U/L (ref 10–44)
ANION GAP SERPL CALC-SCNC: 10 MMOL/L (ref 8–16)
ANION GAP SERPL CALC-SCNC: 9 MMOL/L (ref 8–16)
ASCENDING AORTA: 2.94 CM
AST SERPL-CCNC: 11 U/L (ref 10–40)
AV INDEX (PROSTH): 0.73
AV MEAN GRADIENT: 6 MMHG
AV PEAK GRADIENT: 11 MMHG
AV VALVE AREA BY VELOCITY RATIO: 2.13 CM²
AV VALVE AREA: 2.19 CM²
AV VELOCITY RATIO: 0.71
BASOPHILS # BLD AUTO: 0.08 K/UL (ref 0–0.2)
BASOPHILS NFR BLD: 0.8 % (ref 0–1.9)
BILIRUB SERPL-MCNC: 0.4 MG/DL (ref 0.1–1)
BSA FOR ECHO PROCEDURE: 2.16 M2
BUN SERPL-MCNC: 22 MG/DL (ref 8–23)
BUN SERPL-MCNC: 26 MG/DL (ref 8–23)
CALCIUM SERPL-MCNC: 10 MG/DL (ref 8.7–10.5)
CALCIUM SERPL-MCNC: 9.1 MG/DL (ref 8.7–10.5)
CHLORIDE SERPL-SCNC: 105 MMOL/L (ref 95–110)
CHLORIDE SERPL-SCNC: 106 MMOL/L (ref 95–110)
CHOLEST SERPL-MCNC: 116 MG/DL (ref 120–199)
CHOLEST/HDLC SERPL: 3.4 {RATIO} (ref 2–5)
CO2 SERPL-SCNC: 23 MMOL/L (ref 23–29)
CO2 SERPL-SCNC: 24 MMOL/L (ref 23–29)
CREAT SERPL-MCNC: 1.9 MG/DL (ref 0.5–1.4)
CREAT SERPL-MCNC: 2.3 MG/DL (ref 0.5–1.4)
CREAT UR-MCNC: 80.4 MG/DL (ref 15–325)
CV ECHO LV RWT: 0.28 CM
DIFFERENTIAL METHOD: ABNORMAL
DOP CALC AO PEAK VEL: 1.64 M/S
DOP CALC AO VTI: 37.4 CM
DOP CALC LVOT AREA: 3 CM2
DOP CALC LVOT DIAMETER: 1.95 CM
DOP CALC LVOT PEAK VEL: 1.17 M/S
DOP CALC LVOT STROKE VOLUME: 81.79 CM3
DOP CALC MV VTI: 29.3 CM
DOP CALCLVOT PEAK VEL VTI: 27.4 CM
E WAVE DECELERATION TIME: 148.12 MSEC
E/A RATIO: 1.13
E/E' RATIO: 10.56 M/S
ECHO LV POSTERIOR WALL: 0.8 CM (ref 0.6–1.1)
EJECTION FRACTION: 65 %
EOSINOPHIL # BLD AUTO: 0.7 K/UL (ref 0–0.5)
EOSINOPHIL NFR BLD: 6.7 % (ref 0–8)
ERYTHROCYTE [DISTWIDTH] IN BLOOD BY AUTOMATED COUNT: 13.2 % (ref 11.5–14.5)
EST. GFR  (NO RACE VARIABLE): 23 ML/MIN/1.73 M^2
EST. GFR  (NO RACE VARIABLE): 29 ML/MIN/1.73 M^2
FRACTIONAL SHORTENING: 30 % (ref 28–44)
GLUCOSE SERPL-MCNC: 107 MG/DL (ref 70–110)
GLUCOSE SERPL-MCNC: 92 MG/DL (ref 70–110)
HCT VFR BLD AUTO: 34.3 % (ref 37–48.5)
HDLC SERPL-MCNC: 34 MG/DL (ref 40–75)
HDLC SERPL: 29.3 % (ref 20–50)
HGB BLD-MCNC: 11.2 G/DL (ref 12–16)
IMM GRANULOCYTES # BLD AUTO: 0.02 K/UL (ref 0–0.04)
IMM GRANULOCYTES NFR BLD AUTO: 0.2 % (ref 0–0.5)
INTERVENTRICULAR SEPTUM: 0.77 CM (ref 0.6–1.1)
IVC DIAMETER: 0.63 CM
IVRT: 83.73 MSEC
LA MAJOR: 5.56 CM
LA MINOR: 4.89 CM
LA WIDTH: 3.5 CM
LDLC SERPL CALC-MCNC: 52.8 MG/DL (ref 63–159)
LEFT ATRIUM SIZE: 3 CM
LEFT ATRIUM VOLUME INDEX MOD: 18.6 ML/M2
LEFT ATRIUM VOLUME INDEX: 22.3 ML/M2
LEFT ATRIUM VOLUME MOD: 38.59 CM3
LEFT ATRIUM VOLUME: 46.44 CM3
LEFT INTERNAL DIMENSION IN SYSTOLE: 4.05 CM (ref 2.1–4)
LEFT VENTRICLE DIASTOLIC VOLUME INDEX: 79.1 ML/M2
LEFT VENTRICLE DIASTOLIC VOLUME: 164.53 ML
LEFT VENTRICLE MASS INDEX: 82 G/M2
LEFT VENTRICLE SYSTOLIC VOLUME INDEX: 34.6 ML/M2
LEFT VENTRICLE SYSTOLIC VOLUME: 72.01 ML
LEFT VENTRICULAR INTERNAL DIMENSION IN DIASTOLE: 5.77 CM (ref 3.5–6)
LEFT VENTRICULAR MASS: 169.78 G
LV LATERAL E/E' RATIO: 11.88 M/S
LV SEPTAL E/E' RATIO: 9.5 M/S
LVOT MG: 3.39 MMHG
LVOT MV: 0.88 CM/S
LYMPHOCYTES # BLD AUTO: 3 K/UL (ref 1–4.8)
LYMPHOCYTES NFR BLD: 29.5 % (ref 18–48)
MAGNESIUM SERPL-MCNC: 1.7 MG/DL (ref 1.6–2.6)
MCH RBC QN AUTO: 30.9 PG (ref 27–31)
MCHC RBC AUTO-ENTMCNC: 32.7 G/DL (ref 32–36)
MCV RBC AUTO: 95 FL (ref 82–98)
MONOCYTES # BLD AUTO: 0.7 K/UL (ref 0.3–1)
MONOCYTES NFR BLD: 6.9 % (ref 4–15)
MV A" WAVE DURATION": 114.18 MSEC
MV MEAN GRADIENT: 3 MMHG
MV PEAK A VEL: 0.84 M/S
MV PEAK E VEL: 0.95 M/S
MV PEAK GRADIENT: 4 MMHG
MV STENOSIS PRESSURE HALF TIME: 42.95 MS
MV VALVE AREA BY CONTINUITY EQUATION: 2.79 CM2
MV VALVE AREA P 1/2 METHOD: 5.12 CM2
NEUTROPHILS # BLD AUTO: 5.6 K/UL (ref 1.8–7.7)
NEUTROPHILS NFR BLD: 55.9 % (ref 38–73)
NONHDLC SERPL-MCNC: 82 MG/DL
NRBC BLD-RTO: 0 /100 WBC
OHS LV EJECTION FRACTION SIMPSONS BIPLANE MOD: 72 %
PHOSPHATE SERPL-MCNC: 4.2 MG/DL (ref 2.7–4.5)
PISA TR MAX VEL: 2.39 M/S
PLATELET # BLD AUTO: 193 K/UL (ref 150–450)
PMV BLD AUTO: 10.4 FL (ref 9.2–12.9)
POCT GLUCOSE: 107 MG/DL (ref 70–110)
POCT GLUCOSE: 146 MG/DL (ref 70–110)
POCT GLUCOSE: 62 MG/DL (ref 70–110)
POCT GLUCOSE: 97 MG/DL (ref 70–110)
POTASSIUM SERPL-SCNC: 4.8 MMOL/L (ref 3.5–5.1)
POTASSIUM SERPL-SCNC: 5.1 MMOL/L (ref 3.5–5.1)
PROT SERPL-MCNC: 7.1 G/DL (ref 6–8.4)
PULM VEIN S/D RATIO: 1.63
PV PEAK D VEL: 0.43 M/S
PV PEAK GRADIENT: 4 MMHG
PV PEAK S VEL: 0.7 M/S
PV PEAK VELOCITY: 1.04 M/S
RA MAJOR: 4.97 CM
RA PRESSURE ESTIMATED: 3 MMHG
RA WIDTH: 3.81 CM
RBC # BLD AUTO: 3.62 M/UL (ref 4–5.4)
RIGHT VENTRICULAR END-DIASTOLIC DIMENSION: 3.19 CM
RV TB RVSP: 5 MMHG
RV TISSUE DOPPLER FREE WALL SYSTOLIC VELOCITY 1 (APICAL 4 CHAMBER VIEW): 11.73 CM/S
SINUS: 2.87 CM
SODIUM SERPL-SCNC: 138 MMOL/L (ref 136–145)
SODIUM SERPL-SCNC: 139 MMOL/L (ref 136–145)
SODIUM UR-SCNC: 74 MMOL/L (ref 20–250)
STJ: 2.66 CM
TDI LATERAL: 0.08 M/S
TDI SEPTAL: 0.1 M/S
TDI: 0.09 M/S
TR MAX PG: 23 MMHG
TRICUSPID ANNULAR PLANE SYSTOLIC EXCURSION: 2.79 CM
TRIGL SERPL-MCNC: 146 MG/DL (ref 30–150)
TV REST PULMONARY ARTERY PRESSURE: 26 MMHG
WBC # BLD AUTO: 9.99 K/UL (ref 3.9–12.7)
Z-SCORE OF LEFT VENTRICULAR DIMENSION IN END DIASTOLE: -0.99
Z-SCORE OF LEFT VENTRICULAR DIMENSION IN END SYSTOLE: 0.31

## 2023-09-11 PROCEDURE — 36415 COLL VENOUS BLD VENIPUNCTURE: CPT

## 2023-09-11 PROCEDURE — 92610 EVALUATE SWALLOWING FUNCTION: CPT

## 2023-09-11 PROCEDURE — G0378 HOSPITAL OBSERVATION PER HR: HCPCS

## 2023-09-11 PROCEDURE — 94761 N-INVAS EAR/PLS OXIMETRY MLT: CPT

## 2023-09-11 PROCEDURE — 83735 ASSAY OF MAGNESIUM: CPT

## 2023-09-11 PROCEDURE — 80061 LIPID PANEL: CPT

## 2023-09-11 PROCEDURE — 84300 ASSAY OF URINE SODIUM: CPT

## 2023-09-11 PROCEDURE — 99900035 HC TECH TIME PER 15 MIN (STAT)

## 2023-09-11 PROCEDURE — 82570 ASSAY OF URINE CREATININE: CPT

## 2023-09-11 PROCEDURE — 97161 PT EVAL LOW COMPLEX 20 MIN: CPT

## 2023-09-11 PROCEDURE — 97165 OT EVAL LOW COMPLEX 30 MIN: CPT

## 2023-09-11 PROCEDURE — 36410 PERIPHERAL IV INSERTION: ICD-10-PCS | Mod: ,,, | Performed by: ANESTHESIOLOGY

## 2023-09-11 PROCEDURE — 93010 ELECTROCARDIOGRAM REPORT: CPT | Mod: ,,, | Performed by: INTERNAL MEDICINE

## 2023-09-11 PROCEDURE — 36410 VNPNXR 3YR/> PHY/QHP DX/THER: CPT | Mod: ,,, | Performed by: ANESTHESIOLOGY

## 2023-09-11 PROCEDURE — 63600175 PHARM REV CODE 636 W HCPCS: Performed by: STUDENT IN AN ORGANIZED HEALTH CARE EDUCATION/TRAINING PROGRAM

## 2023-09-11 PROCEDURE — 36000 PLACE NEEDLE IN VEIN: CPT | Mod: 59 | Performed by: ANESTHESIOLOGY

## 2023-09-11 PROCEDURE — 80053 COMPREHEN METABOLIC PANEL: CPT

## 2023-09-11 PROCEDURE — 93010 EKG 12-LEAD: ICD-10-PCS | Mod: ,,, | Performed by: INTERNAL MEDICINE

## 2023-09-11 PROCEDURE — 80048 BASIC METABOLIC PNL TOTAL CA: CPT | Mod: XB

## 2023-09-11 PROCEDURE — 93005 ELECTROCARDIOGRAM TRACING: CPT | Mod: ER

## 2023-09-11 PROCEDURE — 96360 HYDRATION IV INFUSION INIT: CPT

## 2023-09-11 PROCEDURE — 25000003 PHARM REV CODE 250

## 2023-09-11 PROCEDURE — 84100 ASSAY OF PHOSPHORUS: CPT

## 2023-09-11 PROCEDURE — 63600175 PHARM REV CODE 636 W HCPCS

## 2023-09-11 PROCEDURE — 96372 THER/PROPH/DIAG INJ SC/IM: CPT

## 2023-09-11 PROCEDURE — 96361 HYDRATE IV INFUSION ADD-ON: CPT

## 2023-09-11 PROCEDURE — 85025 COMPLETE CBC W/AUTO DIFF WBC: CPT

## 2023-09-11 RX ORDER — IBUPROFEN 200 MG
16 TABLET ORAL
Status: DISCONTINUED | OUTPATIENT
Start: 2023-09-11 | End: 2023-09-12 | Stop reason: HOSPADM

## 2023-09-11 RX ORDER — GLUCAGON 1 MG
1 KIT INJECTION
Status: DISCONTINUED | OUTPATIENT
Start: 2023-09-11 | End: 2023-09-12 | Stop reason: HOSPADM

## 2023-09-11 RX ORDER — IBUPROFEN 200 MG
24 TABLET ORAL
Status: DISCONTINUED | OUTPATIENT
Start: 2023-09-11 | End: 2023-09-12 | Stop reason: HOSPADM

## 2023-09-11 RX ORDER — MAGNESIUM SULFATE HEPTAHYDRATE 40 MG/ML
2 INJECTION, SOLUTION INTRAVENOUS ONCE
Status: COMPLETED | OUTPATIENT
Start: 2023-09-11 | End: 2023-09-11

## 2023-09-11 RX ORDER — ASPIRIN 325 MG
325 TABLET ORAL DAILY
Status: DISCONTINUED | OUTPATIENT
Start: 2023-09-11 | End: 2023-09-12 | Stop reason: HOSPADM

## 2023-09-11 RX ORDER — ATORVASTATIN CALCIUM 40 MG/1
80 TABLET, FILM COATED ORAL NIGHTLY
Status: DISCONTINUED | OUTPATIENT
Start: 2023-09-11 | End: 2023-09-12 | Stop reason: HOSPADM

## 2023-09-11 RX ORDER — INSULIN ASPART 100 [IU]/ML
0-5 INJECTION, SOLUTION INTRAVENOUS; SUBCUTANEOUS
Status: DISCONTINUED | OUTPATIENT
Start: 2023-09-11 | End: 2023-09-12 | Stop reason: HOSPADM

## 2023-09-11 RX ADMIN — ATORVASTATIN CALCIUM 80 MG: 40 TABLET, FILM COATED ORAL at 08:09

## 2023-09-11 RX ADMIN — AMITRIPTYLINE HYDROCHLORIDE 50 MG: 25 TABLET, FILM COATED ORAL at 02:09

## 2023-09-11 RX ADMIN — AMLODIPINE BESYLATE 10 MG: 5 TABLET ORAL at 09:09

## 2023-09-11 RX ADMIN — SODIUM ZIRCONIUM CYCLOSILICATE 10 G: 5 POWDER, FOR SUSPENSION ORAL at 04:09

## 2023-09-11 RX ADMIN — SODIUM CHLORIDE, POTASSIUM CHLORIDE, SODIUM LACTATE AND CALCIUM CHLORIDE 1000 ML: 600; 310; 30; 20 INJECTION, SOLUTION INTRAVENOUS at 01:09

## 2023-09-11 RX ADMIN — MAGNESIUM SULFATE 2 G: 2 INJECTION INTRAVENOUS at 09:09

## 2023-09-11 RX ADMIN — AMITRIPTYLINE HYDROCHLORIDE 50 MG: 25 TABLET, FILM COATED ORAL at 08:09

## 2023-09-11 RX ADMIN — SODIUM CHLORIDE, POTASSIUM CHLORIDE, SODIUM LACTATE AND CALCIUM CHLORIDE 500 ML: 600; 310; 30; 20 INJECTION, SOLUTION INTRAVENOUS at 03:09

## 2023-09-11 RX ADMIN — ENOXAPARIN SODIUM 30 MG: 30 INJECTION SUBCUTANEOUS at 04:09

## 2023-09-11 RX ADMIN — ATORVASTATIN CALCIUM 80 MG: 40 TABLET, FILM COATED ORAL at 02:09

## 2023-09-11 RX ADMIN — ASPIRIN 325 MG: 325 TABLET ORAL at 02:09

## 2023-09-11 RX ADMIN — Medication 16 G: at 06:09

## 2023-09-11 RX ADMIN — METOPROLOL SUCCINATE 100 MG: 50 TABLET, EXTENDED RELEASE ORAL at 09:09

## 2023-09-11 NOTE — PLAN OF CARE
Problem: Physical Therapy  Goal: Physical Therapy Goal  Outcome: Met     PT Eval completed, note to follow. Pt is functioning at her baseline level of function and reports symptoms resolved. Pt is ambulating independently with no AD. Strength/sensation/coordination/balance WFL bilaterally. Recommending d/c home with no therapy or DME needs. D/c PT.

## 2023-09-11 NOTE — PT/OT/SLP EVAL
Physical Therapy Evaluation and Discharge Note    Patient Name:  Martha Sevilla   MRN:  [id number]    Recommendations:     Discharge Recommendations: home  Discharge Equipment Recommendations: none   Barriers to discharge: None    Assessment:     Martha Sevilla is a 67 y.o. female admitted with a medical diagnosis of TIA (transient ischemic attack). .  At this time, patient is functioning at their prior level of function and does not require further acute PT services.     Pt is functioning at her baseline level of function and reports symptoms resolved. Pt is ambulating independently with no AD. Strength/sensation/coordination/balance WFL bilaterally. Recommending d/c home with no therapy or DME needs. D/c PT.     Recent Surgery: * No surgery found *      Plan:     During this hospitalization, patient does not require further acute PT services.  Please re-consult if situation changes.      Subjective     Chief Complaint: none stated  Patient/Family Comments/goals: return home  Pain/Comfort:  Pain Rating 1: 0/10    Patients cultural, spiritual, Scientology conflicts given the current situation: no    Living Environment:  Pt lives with spouse in , 5 steps to enter, R rail, t/s combo  Prior to admission, patients level of function was independent, driving, and does not use DME.  Equipment used at home: none.  DME owned (not currently used): none.  Upon discharge, patient will have assistance from family.    Objective:     Communicated with nsg prior to session.  Patient found HOB elevated with   upon PT entry to room.    General Precautions: Standard, fall    Orthopedic Precautions:N/A   Braces: N/A  Respiratory Status: Room air    Exams:  Cognitive Exam:  Patient is oriented to Person, Place, Situation, and month/year  Fine Motor Coordination:    -       Intact  RLE heel shin, LLE heel shin, and Rapid alternating ankle DF/PF  Postural Exam:  Patient presented with the following abnormalities:    -       Rounded  shoulders  Sensation:    -       Intact  light/touch BLE  RLE ROM: WFL  RLE Strength: WFL  LLE ROM: WFL  LLE Strength: WFL  SLS, romberg stance (and romberg with trunk perturbations in all directions) WFL    Functional Mobility:  Bed Mobility:     Scooting: modified independence  Supine to Sit: modified independence  Sit to Supine: modified independence  Transfers:     Sit to Stand:  modified independence with no AD  Gait: Pt ambulated ~100 ft with no AD and with Mod I/Hersey; no significant gait deficits noted, no LOB     AM-PAC 6 CLICK MOBILITY  Total Score:24       Treatment and Education:  Pt educated on role of PT/POC, signs/symptoms of CVA and seeking immediate medical assistance, and overall home safety  Eval performed and pt ambulated as reported above      AM-PAC 6 CLICK MOBILITY  Total Score:24     Patient left sitting edge of bed with all lines intact, call button in reach, nsg notified, and family present.    GOALS:   Multidisciplinary Problems       Physical Therapy Goals       Not on file              Multidisciplinary Problems (Resolved)          Problem: Physical Therapy    Goal Priority Disciplines Outcome Goal Variances Interventions   Physical Therapy Goal   (Resolved)     PT, PT/OT Met                         History:     Past Medical History:   Diagnosis Date    Anemia     CKD (chronic kidney disease) stage 3, GFR 30-59 ml/min     CKD (chronic kidney disease) stage 4, GFR 15-29 ml/min     DM (diabetes mellitus)     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     Gout, unspecified     Hematuria     HTN (hypertension) 05/20/2021    Hyperglycemia due to diabetes mellitus     Hyperkalemia     Hyperphosphatemia     Hypocalcemia     IgA nephropathy     Osteopenia     Proteinuria     Secondary hyperparathyroidism     Steroid-induced osteopenia     Vitamin D deficiency        Past Surgical History:   Procedure Laterality Date    Evacuation of hematoma of leg Right     KNEE ARTHROPLASTY  Left 3/18/2019    Procedure: ARTHROPLASTY, KNEE left;  Surgeon: Niraj Pérez MD;  Location: [facility];  Service: Orthopedics;  Laterality: Left;    TUBAL LIGATION         Time Tracking:     PT Received On: 09/11/23  PT Start Time: 1410     PT Stop Time: 1419  PT Total Time (min): 9 min     Billable Minutes: Evaluation 9      09/11/2023

## 2023-09-11 NOTE — NURSING
RN received patient around this time. Patient AAOX4 vitally stable on room air, no distress noted. RN completed neuro assessment, and patient denied weakness, and performed neuro task without any difficulties. Patient educated on fall risk, call light and verbalize understanding. Patient bed in lowest position with call light within reach, and family at bedside. RN will continue plan of care.

## 2023-09-11 NOTE — PLAN OF CARE
Problem: SLP  Goal: SLP Goal  Outcome: Met  Bedside Swallow Study completed and speech, language, and cognition screened. Patient at baseline for swallowing, speech, language, and cognition. Recommend: regular diet, thin liquids, standard aspiration precautions. No further skilled ST services warranted at this time. Please re-consult as needed.

## 2023-09-11 NOTE — ED PROVIDER NOTES
Encounter Date: 9/10/2023       History     Chief Complaint   Patient presents with    Near Syncope     Patient reports she was walking in her trailer and suddenly felt weak and was not able to respond for a few seconds. She denies loc or fall and remembers the incident.   Family reports the patient's bg was 109 mg/dl after the incident.       HPI  67 y.o.   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     Gout, unspecified     Hematuria     HTN (hypertension) 05/20/2021    Hyperglycemia due to diabetes mellitus     Hyperkalemia     Hyperphosphatemia     Hypocalcemia     IgA nephropathy     Osteopenia     Proteinuria     Secondary hyperparathyroidism     Steroid-induced osteopenia     Vitamin D deficiency      Past Surgical History:   Procedure Laterality Date    Evacuation of hematoma of leg Right     KNEE ARTHROPLASTY Left 3/18/2019    Procedure: ARTHROPLASTY, KNEE left;  Surgeon: Niraj Pérez MD;  Location: [facility];  Service: Orthopedics;  Laterality: Left;    TUBAL LIGATION       History reviewed. No pertinent family history.  Social History     Tobacco Use    Smoking status: Never    Smokeless tobacco: Never   Substance Use Topics    Alcohol use: No    Drug use: No     Review of Systems  All systems were reviewed/examined and were negative except as noted in the HPI.    Physical Exam     Initial Vitals [09/10/23 1926]   BP Pulse Resp Temp SpO2   116/61 87 20 98.7 °F (37.1 °C) 96 %       MAP       --         Physical Exam    General: the patient is awake, alert, and in no apparent distress.  Head: normocephalic and atraumatic, sclera are clear  Neck: supple without meningismus  Chest: clear to auscultation bilaterally, no respiratory distress  Heart: regular rate and rhythm  ABD soft, nontender, nondistended, no peritoneal signs  Back nt in the midline  Extremities: warm and well perfused  Skin: warm and dry  Psych conversant  Neuro: awake, alert, oriented, CNs intact, fundi w/o papilledema, motor, sensory, and coordination intact in 4 extremities    ED Course   Procedures  Labs Reviewed   CBC W/ AUTO DIFFERENTIAL - Abnormal; Notable for the following components:       Result Value    RBC 3.73 (*)     Hemoglobin 11.7 (*)     Hematocrit 35.7 (*)     MCH 31.4 (*)     Eos # 0.6 (*)     All other components within normal limits   COMPREHENSIVE METABOLIC PANEL - Abnormal; Notable for the following components:    Potassium 5.2 (*)     CO2 21 (*)     Glucose 124 (*)     BUN 27 (*)     Creatinine 2.55 (*)     eGFR 20.3 (*)     All other components within normal limits   TROPONIN I   SARS-COV-2 RNA AMPLIFICATION, QUAL    Narrative:     Is the patient symptomatic?->No        ECG Results              EKG 12-lead (Final result)  Result time 09/11/23 10:06:53      Final result by Interface, Lab In [facility] (09/11/23 10:06:53)                   Narrative:    Test Reason : R47.9,    Vent. Rate : 082 BPM     Atrial Rate : 082 BPM     P-R Int : 178 ms          QRS Dur : 084 ms      QT Int : 362 ms       P-R-T Axes : 047 044 054 degrees     QTc Int : 422 ms    Normal sinus rhythm  Normal ECG  When compared with ECG of 09-MAY-2023 08:23,  No significant change was found  Confirmed by Todd RODRIGUEZ MD, Trever HEREDIA ([id number]) on 9/11/2023 10:06:38 AM    Referred By: [email or web address]   SELF           Confirmed By:Trever Brooks III, MD                                  Imaging Results              CT Head Without Contrast (Final  result)  Result time 09/10/23 20:18:08      Final result by Adore Santana MD (09/10/23 20:18:08)                   Impression:      No acute intracranial finding      Electronically signed by: Adore Santana  Date:    09/10/2023  Time:    20:18               Narrative:    EXAMINATION:  CT HEAD WITHOUT CONTRAST    CLINICAL HISTORY:  Transient ischemic attack (TIA);    TECHNIQUE:  Low dose axial images were obtained through the head.  Coronal and sagittal reformations were also performed. Contrast was not administered.    COMPARISON:  January 2022    FINDINGS:  No acute intracranial hemorrhage or mass effect.  No hydrocephalus.  No acute bony finding.  Right ethmoid sinus opacity                                       X-Ray Chest AP Portable (Final result)  Result time 09/10/23 20:21:21      Final result by Adore Santana MD (09/10/23 20:21:21)                   Impression:      No active or adverse finding      Electronically signed by: Adore Santana  Date:    09/10/2023  Time:    20:21               Narrative:    EXAMINATION:  XR CHEST AP PORTABLE    CLINICAL HISTORY:  Unspecified speech disturbances    TECHNIQUE:  Single frontal portable view of the chest was performed.    COMPARISON:  January 5, 2022    FINDINGS:  Lungs are well aerated.  Mediastinal contour stable                                       Medications   enoxaparin injection 30 mg (30 mg Subcutaneous Given 9/11/23 1629)   polyethylene glycol packet 17 g (17 g Oral Not Given 9/11/23 0927)   melatonin tablet 6 mg (has no administration in time range)   amitriptyline tablet 50 mg (50 mg Oral Given 9/11/23 2040)   amLODIPine tablet 10 mg (10 mg Oral Given 9/11/23 0927)   metoprolol succinate (TOPROL-XL) 24 hr tablet 100 mg (100 mg Oral Given 9/11/23 0927)   sodium zirconium cyclosilicate packet 10 g (10 g Oral Given 9/11/23 1629)   glucose chewable tablet 16 g (16 g Oral Given 9/11/23 0648)   glucose chewable tablet 24 g (has no  administration in time range)   glucagon (human recombinant) injection 1 mg (has no administration in time range)   insulin aspart U-100 pen 0-5 Units (has no administration in time range)   dextrose 10% bolus 125 mL 125 mL (has no administration in time range)   dextrose 10% bolus 250 mL 250 mL (has no administration in time range)   aspirin tablet 325 mg (325 mg Oral Not Given 9/11/23 0927)   atorvastatin tablet 80 mg (80 mg Oral Given 9/11/23 2040)   lactated ringers bolus 500 mL (0 mLs Intravenous Stopped 9/11/23 0430)   magnesium sulfate 2g in water 50mL IVPB (premix) (0 g Intravenous Stopped 9/11/23 1130)   lactated ringers bolus 1,000 mL (0 mLs Intravenous Stopped 9/11/23 1549)     Medical Decision Making  Amount and/or Complexity of Data Reviewed  Labs: ordered.  Radiology: ordered.       Medical Decision Making:    This is an emergent evaluation of a patient presenting to the ED.  Nursing notes were reviewed.  I personally reviewed, read, and interpreted the ECG and any monitoring strips.  ECG: normal EKG, normal sinus rhythm, unchanged from previous tracings Compared with prior if available.  Read and interpreted by me independently.      I reviewed radiology images personally along with interpretations.  Non ritical  I personally reviewed and interpreted the laboratory results.  Communicated with another physician regarding patient's care: hosp medicine  I decided to obtain and review old medical records, which showed: well care    A-sx currently  Will obs for TIA elmore  Pt agreeable      Kali Giraldo MD, BRIANNA                          Clinical Impression:   Final diagnoses:  [R47.9] Difficulty with speech        ED Disposition Condition    Observation Stable             Kali Giraldo MD, BRIANNA, FACEP  Department of Emergency Medicine       Bradley Giraldo MD  09/12/23 0054

## 2023-09-11 NOTE — PROGRESS NOTES
[facility]           Pharmacy        Current Drug Shortage     Due to national backorder and [facility] is critically low on inventory of Dextrose 50% (D50) Syringes and Vials, pharmacy has automatically switched from D50% to D10% IVPB at the equivalent dose until resolution of the shortage per P&T approved protocol.               Ashlyn Tran, PharmD  [phone]

## 2023-09-11 NOTE — ED NOTES
Spoke with Jeanie at [facility].

## 2023-09-11 NOTE — PT/OT/SLP EVAL
Occupational Therapy   Evaluation/Dc Summary     Name: Martha Sevilla  MRN: [id number]  Admitting Diagnosis: TIA (transient ischemic attack)  Recent Surgery: * No surgery found *      Recommendations:     Discharge Recommendations: home  Discharge Equipment Recommendations:  none  Barriers to discharge:  None    Assessment:     Martha Sevilla is a 67 y.o. female with a medical diagnosis of TIA (transient ischemic attack).  She presents with The primary encounter diagnosis was TIA (transient ischemic attack). Diagnoses of Difficulty with speech and Arrhythmia were also pertinent to this visit.  . Performance deficits affecting function: pain.      Pt performing at baseline for ADLs and functional mobility. BUE ROM/MMT/FMC/GMC/sensation all WFL. Pt reports no visual deficits. Pt does not require further OT services at this time. D/c OT.     Rehab Prognosis: Good; patient would benefit from acute skilled OT services to address these deficits and reach maximum level of function.       Plan:     Patient to be seen  (d/c OT) to address the above listed problems via    Plan of Care Expires: 09/11/23  Plan of Care Reviewed with: patient, family    Subjective     Chief Complaint: pain in LE from accident and knee replacement   Patient/Family Comments/goals: return home     Occupational Profile:  Living Environment: with spouse in , 5 steps to enter, R rail, t/s combo  Previous level of function: independent; drives; does not use/own DME   Equipment Used at Home: none  Assistance upon Discharge: from family if required      Pain/Comfort:  Pain Rating 1: 0/10    Patients cultural, spiritual, Pentec[employer]al conflicts given the current situation:      Objective:     Communicated with: nsmichael prior to session.  Patient found supine with   upon OT entry to room.    General Precautions: Standard, fall  Orthopedic Precautions: N/A  Braces: N/A  Respiratory Status: Room air    Occupational Performance:    Bed Mobility:    Patient  completed Scooting/Bridging with modified independence  Patient completed Supine to Sit with modified independence  Patient completed Sit to Supine with modified independence    Functional Mobility/Transfers:  Patient completed Sit <> Stand Transfer with modified independence  with  no assistive device   Functional Mobility: Mod I without AD     Activities of Daily Living:  Lower Body Dressing: modified independence      Cognitive/Visual Perceptual:  WFL     Physical Exam:  Balance:    -       WFL   Sensation:    -       Intact  Dominant hand:    -       left  Upper Extremity Range of Motion:   BUE WFL   Upper Extremity Strength:  BUE WFL    Strength:  BUE WFL   Fine Motor Coordination:  WFL     AMPAC 6 Click ADL:  AMPAC Total Score: 24    Treatment & Education:  Pt educated on signs/symptoms CVA  Functional mobility in room and hallway     Patient left supine with all lines intact, call button in reach, and nsg and family  present    GOALS:   Multidisciplinary Problems       Occupational Therapy Goals          Problem: Occupational Therapy    Goal Priority Disciplines Outcome Interventions   Occupational Therapy Goal     OT, PT/OT Adequate for Care Transition                        History:     Past Medical History:   Diagnosis Date    Anemia     CKD (chronic kidney disease) stage 3, GFR 30-59 ml/min     CKD (chronic kidney disease) stage 4, GFR 15-29 ml/min     DM (diabetes mellitus)     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     Gout, unspecified     Hematuria     HTN (hypertension) 05/20/2021    Hyperglycemia due to diabetes mellitus     Hyperkalemia     Hyperphosphatemia     Hypocalcemia     IgA nephropathy     Osteopenia     Proteinuria     Secondary hyperparathyroidism     Steroid-induced osteopenia     Vitamin D deficiency          Past Surgical History:   Procedure Laterality Date    Evacuation of hematoma of leg Right     KNEE ARTHROPLASTY Left 3/18/2019    Procedure: ARTHROPLASTY, KNEE  left;  Surgeon: Niraj Pérez MD;  Location: [facility];  Service: Orthopedics;  Laterality: Left;    TUBAL LIGATION         Time Tracking:     OT Date of Treatment: 09/11/23  OT Start Time: 1348  OT Stop Time: 1358  OT Total Time (min): 10 min    Billable Minutes:Evaluation 10    9/11/2023

## 2023-09-11 NOTE — NURSING
PROACTIVE ROUNDING NOTE       Left upper arm 20g started with US, good blood return noted, saline locked, secured with tape, reported off to bedside RN Samuel

## 2023-09-11 NOTE — PLAN OF CARE
SW met with pt's drt Barbara [phone] and pt's sister Nu [phone] at bedside this AM to complete DCA. Pt's  and name were confirmed by family. Pt lives at home with her  Missael [phone], pt will have family support to help transport home at time of d/c. Pt still drives and does not have HH. Pt does not use any HME and is fully independent in her ADL's. Pt's family did not have any additional questions or concerns for sw at this time. Rounds completed on pt. All questions addressed. Bedside nurse to discuss d/c medications. Discussed importance to attend all f/u appts and take medications as prescribed. Verbalized understanding. Case Management to sign off.     ALICIA Lima  [phone]    Future Appointments   Date Time Provider Department Center   2023 11:45 AM Mariana Cardona MD [facility]        23 1036   Discharge Planning   Assessment Type Discharge Planning Brief Assessment   Support Systems Children;Family members   Equipment Currently Used at Home none   Current Living Arrangements home   Care Facility Name HOME   Patient/Family Anticipates Transition to home with family   DME Needed Upon Discharge  other (see comments)  (tbd)   Discharge Plan A Home with family

## 2023-09-11 NOTE — PROGRESS NOTES
"U IM Resident Progress Note    Subjective:      Martha Sevilla is a 67 y.o.  female with history of  CKD4, DM type 2, HTN, HLD, Osteopenia, and secondary hyperparathyroidism who is being followed by the [facility] Medicine service at [facility] for evaluation of VICTORIA with concern for TIA.     Patient sitting on the side of the bed in no acute distress at this time. States she is feeling back to her baseline and feels ready to leave.         Objective:   Last 24 Hour Vital Signs:  BP  Min: 101/57  Max: 124/58  Temp  Av.4 °F (36.3 °C)  Min: 96.3 °F (35.7 °C)  Max: 98.7 °F (37.1 °C)  Pulse  Av.3  Min: 71  Max: 87  Resp  Av.4  Min: 16  Max: 23  SpO2  Av.3 %  Min: 94 %  Max: 99 %  Height  Av' 6" (167.6 cm)  Min: 5' 6" (167.6 cm)  Max: 5' 6" (167.6 cm)  Weight  Av kg (222 lb 11.7 oz)  Min: 100 kg (220 lb 7.4 oz)  Max: 102.1 kg (225 lb)  No intake/output data recorded.    Physical Examination:  Physical Exam  Vitals and nursing note reviewed. Exam conducted with a chaperone present.   Constitutional:       General: She is not in acute distress.     Appearance: Normal appearance. She is not ill-appearing, toxic-appearing or diaphoretic.   HENT:      Head: Normocephalic.      Right Ear: External ear normal.      Left Ear: External ear normal.      Nose: Nose normal.      Mouth/Throat:      Mouth: Mucous membranes are moist.      Pharynx: Oropharynx is clear.   Eyes:      Extraocular Movements: Extraocular movements intact.      Pupils: Pupils are equal, round, and reactive to light.   Cardiovascular:      Rate and Rhythm: Normal rate.      Pulses: Normal pulses.   Pulmonary:      Effort: Pulmonary effort is normal. No respiratory distress.      Breath sounds: No wheezing or rales.   Abdominal:      General: Abdomen is flat. There is no distension.      Palpations: Abdomen is soft. There is no mass.      Tenderness: There is no abdominal tenderness.   Musculoskeletal:    "      General: Normal range of motion.      Cervical back: Normal range of motion.   Skin:     General: Skin is warm.      Capillary Refill: Capillary refill takes less than 2 seconds.   Neurological:      General: No focal deficit present.      Mental Status: She is alert. Mental status is at baseline.   Psychiatric:         Mood and Affect: Mood normal.         Laboratory:  Most Recent Data:  CBC:   Lab Results   Component Value Date    WBC 9.99 09/11/2023    HGB 11.2 (L) 09/11/2023    HCT 34.3 (L) 09/11/2023     09/11/2023    MCV 95 09/11/2023    RDW 13.2 09/11/2023       BMP:   Lab Results   Component Value Date     09/11/2023    K 4.8 09/11/2023     09/11/2023    CO2 23 09/11/2023    BUN 26 (H) 09/11/2023    CREATININE 2.3 (H) 09/11/2023    GLU 92 09/11/2023    CALCIUM 9.1 09/11/2023    MG 1.7 09/11/2023    PHOS 4.2 09/11/2023     LFTs:   Lab Results   Component Value Date    PROT 7.1 09/11/2023    ALBUMIN 3.7 09/11/2023    BILITOT 0.4 09/11/2023    AST 11 09/11/2023    ALKPHOS 75 09/11/2023    ALT 8 (L) 09/11/2023     Coags:   Lab Results   Component Value Date    INR 1.1 05/20/2021     FLP:   Lab Results   Component Value Date    CHOL 116 (L) 09/11/2023    HDL 34 (L) 09/11/2023    LDLCALC 52.8 (L) 09/11/2023    TRIG 146 09/11/2023    CHOLHDL 29.3 09/11/2023     DM:   Lab Results   Component Value Date    HGBA1C 5.4 08/15/2023    HGBA1C 5.5 05/16/2023    HGBA1C 6.0 (H) 02/15/2023    LDLCALC 52.8 (L) 09/11/2023    CREATININE 2.3 (H) 09/11/2023     Thyroid:   Lab Results   Component Value Date    TSH 3.240 07/21/2022    FREET4 0.75 05/20/2021     Anemia:   Lab Results   Component Value Date    IRON 68 08/15/2023    TIBC 324 08/15/2023    FERRITIN 113 08/15/2023    SCBNVWKJ08 406 05/20/2021    FOLATE 6.6 05/20/2021     Cardiac:   Lab Results   Component Value Date    TROPONINI <0.012 09/10/2023     Urinalysis:   Lab Results   Component Value Date    LABURIN No growth 07/22/2022    COLORU Yellow  05/16/2023    SPECGRAV >=1.030 (A) 05/16/2023    NITRITE Negative 05/16/2023    KETONESU Negative 05/16/2023    UROBILINOGEN Negative 05/16/2023    WBCUA 5 05/16/2023       Trended Lab Data:  Recent Labs   Lab 09/10/23  1952 09/11/23  0239   WBC 10.13 9.99   HGB 11.7* 11.2*   HCT 35.7* 34.3*    193   MCV 96 95   RDW 13.0 13.2    138   K 5.2* 4.8    105   CO2 21* 23   BUN 27* 26*   CREATININE 2.55* 2.3*   * 92   PROT 8.2 7.1   ALBUMIN 4.2 3.7   BILITOT 0.5 0.4   AST 24 11   ALKPHOS 73 75   ALT 13 8*       Trended Cardiac Data:  Recent Labs   Lab 09/10/23  1952   TROPONINI <0.012       Microbiology Data Reviewed: yes  Pertinent Findings:      Other Results:  EKG (my interpretation): Sinus with no ST or T wave changes noted.    Radiology Data Reviewed: yes    Current Medications:     Infusions:       Scheduled:   amitriptyline  50 mg Oral QHS    amLODIPine  10 mg Oral Daily    aspirin  325 mg Oral Daily    atorvastatin  80 mg Oral QHS    calcifediol  30 mcg Oral Daily    enoxparin  30 mg Subcutaneous Daily    metoprolol succinate  100 mg Oral Daily    polyethylene glycol  17 g Oral Daily    sodium zirconium cyclosilicate  10 g Oral Every Mon, Wed, Fri        PRN:  dextrose 10%, dextrose 10%, glucagon (human recombinant), glucose, glucose, insulin aspart U-100, melatonin    Antibiotics and Day Number of Therapy:  None at this time    Lines and Day Number of Therapy:  PIV     Assessment:     Martha Sevilla is a 67 y.o.female with  Patient Active Problem List    Diagnosis Date Noted    Difficulty with speech 09/10/2023    TIA (transient ischemic attack) 09/10/2023    Stage 3b chronic kidney disease 07/07/2021    Persistent proteinuria 07/07/2021    Metabolic bone disease 07/07/2021    IgA nephropathy 07/07/2021    Vitamin D deficiency 07/07/2021    Hematuria     VICTORIA (acute kidney injury) 05/20/2021    Gross hematuria 05/20/2021    HTN (hypertension) 05/20/2021    History of ITP 05/20/2021     Morbid obesity with BMI of 40.0-44.9, adult 05/20/2021    Insomnia 05/20/2021    Normocytic anemia 05/20/2021    Arthritis of knee, left 03/18/2019    History of left knee replacement 03/18/2019        Plan:     Martha Sevilla is a 67 y.o.  female with history of  CKD4, DM type 2, HTN, HLD, Osteopenia, and secondary hyperparathyroidism who is being followed by the [facility] Internal Medicine service at [facility] for evaluation of VICTORIA with concern for TIA.     Suspected TIA  Near Syncope  - Described weakness and lightheadedness w/ dysarthria for 2-3 minutes which quickly resolved  - Denies LOC  - CT Head w/ con negative for bleed  - EKG sinus with no st changes  - MRI brain pending   - TTE Pending   - ABCD2 score 2 low risk  - no FND noted on exam, patient at baseline   -  daily per ABCD2 score  - Atorvastatin 80     VICTORIA on CKD 4 vs worsening CKD status  - Follows with Nephrology, worsening kidney function within last year.  - sCr 2.55 with baseline around 1.7, improved to 2.3  - Denies any urinary retention   - Urine studies pending   - Mag 1.7 this am will replete  - Renal ultrasound pending      Hyperkalemia  - K 5.2 at admit, no EKG changes, improved this AM  - Patient on Lokelma at home, recently ran out  - Restarted home lokelma     HTN  - On Lisinopril 40, Amlodipine 10 at home  - holding lisinopril 40 in setting of VICTORIA     Secondary Hyperparathyroidism 2/2 renal dysfunction  - Follows with Nephrology for management   - Continue home Calcifediol      T2DM  - Last A1c 5.4 (8/15/2023)  - On Metforamin 1,000 BID and Ozempic at home   - SSI while inpatient     Normocytic Anemia   - H/H 11.7/35.7, MCV 96  - Iron panel, ferritin normal on 8/15     HLD  - Last lipid Panel 7/21/22 ASCVD 14%  - Lipitor 10 on chart review, patient not taking  - Will increase lipitor to 80 in setting of possible TIA     Osteopenia   - Last Dexa 2/09/2022  - Received Prolia injection    AGMA (Resolved)   - AGAP  13, Bicarb 21 in the setting of VICTORIA on admission.   - 9/11 CMP resolved      Fluids: PO   Nutrition: NPO for now but when cleared by speech will be Renal diet   PPx: Lovenox renal dosing   Code status: Full     Dispo: Pending MRI brain, 24-48 hrs      Waylon Agudelo MD  [facility] IM/EM PGY 5  [facility] Internal [facility]     [facility] Medicine Hospitalist Pager numbers:   [facility] Hospital[facility] Medicine Team A (Freddy/Mitzy): [phone]  [facility] [place] [facility] Hospital[facility] Medicine Team B (Jenniffer/Jared):  [phone]

## 2023-09-11 NOTE — H&P
"[facility] Medicine H&P Note     Admitting Team: [facility] Hospitalist Team A  Attending Physician: Freddy  Resident: aMgnus  Intern: Siddhartha    Date of Admit: 9/10/2023    Chief Complaint     Dysarthria and near syncope     Subjective:      History of Present Illness:  Martha Sevilla is a 67 y.o. female with a PMH of CKD stage 4, T2DM, HTN, HLD, Osteopenia, and Secondary hyperparathyroidism who presented to [facility] on 9/10/2023 with a primary complaint of near syncope and aphasia for 2 minutes    Patient was in their normal state of health until earlier this afternoon when she stood up and walked 10ft to the otherside of her trailer to talk to her  and found she could not speak. Denies LOC. She describes she was trying to talk but "her mouth would not move" . Over the course of the next 2-3 minutes the dysarthria resolved. The dysarthria was associated with general weakness and light headedness. It was not associated with any vertigo, headaches, changes in vision or hearing, chest pains, SOB, nausea, or abdominal pains.  who witnessed the event states she had no facial droops, eye deviations, or any obvious deficits. After the dysarthria resolved, patient states she was completely back to baseline. At baseline patient ambulates on her own, can walk a couple of blocks without SOB. Performs all ADLS.     Past Medical History:  Past Medical History:   Diagnosis Date    Anemia     CKD (chronic kidney disease) stage 3, GFR 30-59 ml/min     CKD (chronic kidney disease) stage 4, GFR 15-29 ml/min     DM (diabetes mellitus)     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     Gout, unspecified     Hematuria     HTN (hypertension) 05/20/2021    Hyperglycemia due to diabetes mellitus     Hyperkalemia     Hyperphosphatemia     Hypocalcemia     IgA nephropathy     Osteopenia     Proteinuria     Secondary hyperparathyroidism     Steroid-induced osteopenia     Vitamin D deficiency  " "      Past Surgical History:  Past Surgical History:   Procedure Laterality Date    Evacuation of hematoma of leg Right     KNEE ARTHROPLASTY Left 3/18/2019    Procedure: ARTHROPLASTY, KNEE left;  Surgeon: Niraj Pérez MD;  Location: [facility];  Service: Orthopedics;  Laterality: Left;    TUBAL LIGATION         Allergies:  Farxiga - urticaria     Home Medications:  Allopurinol 100 daily  Amitriptyline 50 nightly   Amlodipine 10 daily  Prolia injections every 6 months  Lisinopril 40  Toprol  daily   Calcifediol Cs24 30 mcg daily  Metformin 1000 BID  Ozempic  Lokelma     Reported not taking  - Lasix 40 PRN for leg swelling  - Nystatin   - Atorvastatin 10    Family History:  Endometrial carcinoma in the mother  Heart failure and MI in the father  Prostate cancer in the brother  HTN in sister     Social History:  Tobacco- denies  Alcohol- denies  Illicits- denies  Lives with  in trailer home    Review of Systems:  Pertinent items are noted in HPI. All other systems are reviewed and are negative.    Health Maintaince :   Primary Care Physician: Sofya Rangel     Immunizations:   TDap  UTD   Flu  UTD  Pna  2020  DEXA due 24    Cancer Screening:  PAP: No longer receiving, no history of abnormal PAPs  MMG: Not UTD  Colonoscopy: Per patient 2023, 10 yr follow up     Objective:   Last 24 Hour Vital Signs:  BP  Min: 101/57  Max: 124/58  Temp  Av.4 °F (36.9 °C)  Min: 98 °F (36.7 °C)  Max: 98.7 °F (37.1 °C)  Pulse  Av.3  Min: 80  Max: 87  Resp  Av.5  Min: 20  Max: 23  SpO2  Av.5 %  Min: 96 %  Max: 99 %  Height  Av' 6" (167.6 cm)  Min: 5' 6" (167.6 cm)  Max: 5' 6" (167.6 cm)  Weight  Av.1 kg (225 lb)  Min: 102.1 kg (225 lb)  Max: 102.1 kg (225 lb)  Body mass index is 36.32 kg/m².  No intake/output data recorded.    Physical Examination:  Gen: Obese WF, NAD, laying in bed, pleasant on interaction   HEENT: head normocephalic, atraumatic; PERRL; EOMI; trachea midline  Cardiac: " regular rate and rhythm; no murmurs, rubs, or gallops  Resp: clear to auscultation bilaterally; no wheezing; normal work of breathing  GI: abdomen soft, non-tender, non-distended; normoactive bowel sounds, no hepatosplenomegaly  Extrem: no clubbing or swelling noted  Skin: no rashes or bruises noted, surgical scar on R shoulder (cyst removal)  Neuro: AAOx3, CN III-VII, IX-XII intact; strength 5/5 in all extremities; sensation intact to light touch in all extremities.    Laboratory:  Most Recent Data:  CBC:   Lab Results   Component Value Date    WBC 10.13 09/10/2023    HGB 11.7 (L) 09/10/2023    HCT 35.7 (L) 09/10/2023     09/10/2023    MCV 96 09/10/2023    RDW 13.0 09/10/2023       FLP:   Lab Results   Component Value Date    CHOL 154 07/21/2022    HDL 34 (L) 07/21/2022    LDLCALC 87.8 07/21/2022    TRIG 161 (H) 07/21/2022    CHOLHDL 22.1 07/21/2022     Thyroid:   Lab Results   Component Value Date    TSH 3.240 07/21/2022    FREET4 0.75 05/20/2021     Cardiac:   Lab Results   Component Value Date    TROPONINI <0.012 09/10/2023     Trended Lab Data:  Recent Labs   Lab 09/10/23  1952   WBC 10.13   HGB 11.7*   HCT 35.7*      MCV 96   RDW 13.0      K 5.2*      CO2 21*   BUN 27*   CREATININE 2.55*   *   PROT 8.2   ALBUMIN 4.2   BILITOT 0.5   AST 24   ALKPHOS 73   ALT 13       Trended Cardiac Data:  Recent Labs   Lab 09/10/23  1952   TROPONINI <0.012       Microbiology Data:  None    Other Results:  EKG (my interpretation): NSR, normal axis, normal intervals, no acute ST changes or TWAs    Radiology:  Imaging Results              CT Head Without Contrast (Final result)  Result time 09/10/23 20:18:08      Final result by Adore Santana MD (09/10/23 20:18:08)                   Impression:      No acute intracranial finding      Electronically signed by: Adore Santana  Date:    09/10/2023  Time:    20:18               Narrative:    EXAMINATION:  CT HEAD WITHOUT CONTRAST    CLINICAL  HISTORY:  Transient ischemic attack (TIA);    TECHNIQUE:  Low dose axial images were obtained through the head.  Coronal and sagittal reformations were also performed. Contrast was not administered.    COMPARISON:  January 2022    FINDINGS:  No acute intracranial hemorrhage or mass effect.  No hydrocephalus.  No acute bony finding.  Right ethmoid sinus opacity                                       X-Ray Chest AP Portable (Final result)  Result time 09/10/23 20:21:21      Final result by Adore Santana MD (09/10/23 20:21:21)                   Impression:      No active or adverse finding      Electronically signed by: Adore Santana  Date:    09/10/2023  Time:    20:21               Narrative:    EXAMINATION:  XR CHEST AP PORTABLE    CLINICAL HISTORY:  Unspecified speech disturbances    TECHNIQUE:  Single frontal portable view of the chest was performed.    COMPARISON:  January 5, 2022    FINDINGS:  Lungs are well aerated.  Mediastinal contour stable                                    TTE 5/21/21  - LVEF 65%  - normal L diastolic and systolic fxn     Assessment:     Martha Sevilla is a 67 y.o. female with a PMH of CKD stage 4, T2DM, HTN, HLD, Osteopenia, and Secondary hyperparathyroidism who presented to [facility] on 9/10/2023 with a primary complaint of near syncope and aphasia for 2 minutes.   Admitted to [facility] for TIA / presyncopal workup in the setting of VICTORIA on CKD.     Plan:     Suspected TIA  Near Syncope  - Described weakness and lightheadedness w/ dysarthria for 2-3 minutes which quickly resolved  - denies LOC  - CT Head w/ con negative for bleed  - ABCD2 score 2 low risk  - no FND noted on exam, patient at baseline   - EKG unremarkable   - MRI brain w/o contrast  -  daily per ABCD2 score  - Atorvastatin 80  - Can consider TTE as well for syncopal workup, last normal in 2021    VICTORIA on CKD 4 vs worsening CKD status  - Follows with Nephrology, worsening kidney  function within last year.  - sCr 2.55, baseline hard to determine, roughly 1.7  - Denies any urinary retention   - 500 LR bolus  - Urine studies ordered  - Mg and phos  - Morning cmp  - Can consider renal US in morning    Hyperkalemia  - K 5.2 at admit, no EKG changes   - Patient on Lokelma at home, recently ran out   - Restarting home lokelma   - Follow up CMP     AGMA  - AGAP 13, Bicarb 21 in the setting of VICTORIA   - follow gap as VICTORIA resolves     HTN  - On Lisinopril 40, Amlodipine 10 at home  - holding lisinopril 40 in setting of VICTORIA    Secondary Hyperparathyroidism 2/2 renal dysfunction  - Follows with Nephrology for management   - Continue home Calcifediol     T2DM  - Last A1c 5.4 (8/15/2023)  - On Metforamin 1,000 BID and Ozempic at home   - SSI while inpatient    Normocytic Anemia   - H/H 11.7/35.7, MCV 96  - Iron panel, ferritin normal on 8/15  - CTM    HLD  - Last lipid Panel 7/21/22 ASCVD 14%  - Lipitor 10 on chart review, patient not taking  - Increasing Lipitor to 80 with recent TIA vs Stroke    Osteopenia   - Last Dexa 2/09/2022  - received Prolia injection    Fluids: LR bolus  Nutrition: Renal diet with speech clearance   PPx: Lovenox renal dosing   Code status: Full    Dispo: Pending MRI brain, 24-48 hrs    Keith Duenas MD  [facility] Internal Medicine HO-II    [facility] Medicine Hospitalist Pager numbers:   [facility] Hospitalist Medicine Team A (Freddy/Mitzy): [phone]  [facility] Hospitalist Medicine Team B (Jenniffer/Jared):  [phone]

## 2023-09-11 NOTE — PT/OT/SLP EVAL
Speech Language Pathology Evaluation  Cognitive/Bedside Swallow  Discharge    Patient Name:  Martha Sevilla   MRN:  [id number]  [id number]    Admitting Diagnosis: TIA (transient ischemic attack)    Recommendations:                  General Recommendations:  Follow-up not indicated  Diet recommendations:  Regular Diet - IDDSI Level 7, Thin liquids - IDDSI Level 0   Aspiration Precautions: Standard aspiration precautions   General Precautions: Standard,    Communication strategies:  none    Assessment:     Martha Sevilla is a 67 y.o. female with no speech, language, cognitive, or swallowing impairments. No further skilled acute Speech Therapy services warranted at this time. Please re-consult as needed.       History:     Past Medical History:   Diagnosis Date    Anemia     CKD (chronic kidney disease) stage 3, GFR 30-59 ml/min     CKD (chronic kidney disease) stage 4, GFR 15-29 ml/min     DM (diabetes mellitus)     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     Gout, unspecified     Hematuria     HTN (hypertension) 05/20/2021    Hyperglycemia due to diabetes mellitus     Hyperkalemia     Hyperphosphatemia     Hypocalcemia     IgA nephropathy     Osteopenia     Proteinuria     Secondary hyperparathyroidism     Steroid-induced osteopenia     Vitamin D deficiency        Past Surgical History:   Procedure Laterality Date    Evacuation of hematoma of leg Right     KNEE ARTHROPLASTY Left 3/18/2019    Procedure: ARTHROPLASTY, KNEE left;  Surgeon: Niraj Pérez MD;  Location: [facility] OR;  Service: Orthopedics;  Laterality: Left;    TUBAL LIGATION       MD note 9/11: History of Present Illness:  Martha Sevilla is a 67 y.o. female with a PMH of CKD stage 4, T2DM, HTN, HLD, Osteopenia, and Secondary hyperparathyroidism who presented to [facility] on 9/10/2023 with a primary complaint of near syncope and aphasia for 2 minutes  Patient was in their normal state of health until earlier this  "afternoon when she stood up and walked 10ft to the otherside of her trailer to talk to her  and found she could not speak. Denies LOC. She describes she was trying to talk but "her mouth would not move" . Over the course of the next 2-3 minutes the dysarthria resolved. The dysarthria was associated with general weakness and light headedness. It was not associated with any vertigo, headaches, changes in vision or hearing, chest pains, SOB, nausea, or abdominal pains.  who witnessed the event states she had no facial droops, eye deviations, or any obvious deficits. After the dysarthria resolved, patient states she was completely back to baseline. At baseline patient ambulates on her own, can walk a couple of blocks without SOB. Performs all ADLS.     Head CT: No acute intracranial finding    Chest X-Rays: No active or adverse finding     Prior diet: reg/thin.    Subjective     Patient awake and sitting EOB for po trials.   SLP communicated with RN prior to entry. RN cleared SLP to administer po trials.     Patient goals: home    Pain/Comfort:  Pain Rating 1: 0/10    Respiratory Status: Room air    Objective:     COGNITIVE STATUS:  Behavioral Observations: alert and cooperative  Memory: WFL; baseline per patient  Orientation: Oriented x4   Attention: WFL  Problem Solving: WFL   Insight Awareness: pt with good insight   Sequencing: % accuracy with 4 word mental manipulation task; pt with accurate sequencing of functional events  Pragmatics: WNL  Money/Time Management: WFL    LANGUAGE:    Receptive Language:   Complex y/n Questions: % accuracy  Complex Directions: % accuracy    Expressive Language:  Naming: WFL; no word finding impairments in conversation; patient reports no difficulties since brief episode prior to admission  Automatic Speech: WFL   Responsive Speech: WFL    Motor Speech: No noted Dysarthria, Apraxia of Speech, Ataxia    Voice: adequate volumate, rate, prosody, breath " support    Augmentative Alternative Communication: no    Visual-Spatial: WFL; no noted neglect      Oral Musculature Evaluation  Oral Musculature: WFL  Dentition: scattered dentition, rarely or never uses dentures to eat  Mucosal Quality: adequate  Mandibular Strength and Mobility: WFL  Oral Labial Strength and Mobility: WFL  Lingual Strength and Mobility: WFL  Volitional Cough: elicited  Volitional Swallow: timely  Voice Prior to PO Intake: clear    Bedside Swallow Eval:   Consistencies Assessed:  Thin liquid sips of milk via straw   Solids bites of cereal     Oral Phase:   WFL     Pharyngeal Phase:   no overt clinical signs/symptoms of aspiration  no overt clinical signs/symptoms of pharyngeal dysphagia     Compensatory Strategies  None     Treatment: SLP provided patient education on SLP role, s/s and risks of aspiraiton, safe swallow precautions, and POC. Patient v/u of all discussed. SLP communicated recommendations with RN.       Goals:   Multidisciplinary Problems       SLP Goals       Not on file                    Plan:       Plan of Care reviewed with:  patient   SLP Follow-Up:  No       Discharge recommendations:  Discharge Facility/Level of Care Needs:  (no ST needs)   Barriers to Discharge:  None    Time Tracking:     SLP Treatment Date:   09/11/23  Speech Start Time:  0847  Speech Stop Time:  0856     Speech Total Time (min):  9 min    Billable Minutes: Eval Swallow and Oral Function 9    09/11/2023

## 2023-09-12 VITALS
WEIGHT: 220 LBS | BODY MASS INDEX: 35.36 KG/M2 | HEART RATE: 73 BPM | HEIGHT: 66 IN | SYSTOLIC BLOOD PRESSURE: 114 MMHG | DIASTOLIC BLOOD PRESSURE: 62 MMHG | RESPIRATION RATE: 18 BRPM | OXYGEN SATURATION: 98 % | TEMPERATURE: 98 F

## 2023-09-12 LAB
ALBUMIN SERPL BCP-MCNC: 3.4 G/DL (ref 3.5–5.2)
ANION GAP SERPL CALC-SCNC: 10 MMOL/L (ref 8–16)
BUN SERPL-MCNC: 23 MG/DL (ref 8–23)
CALCIUM SERPL-MCNC: 9.5 MG/DL (ref 8.7–10.5)
CHLORIDE SERPL-SCNC: 105 MMOL/L (ref 95–110)
CO2 SERPL-SCNC: 25 MMOL/L (ref 23–29)
CREAT SERPL-MCNC: 1.7 MG/DL (ref 0.5–1.4)
EST. GFR  (NO RACE VARIABLE): 33 ML/MIN/1.73 M^2
GLUCOSE SERPL-MCNC: 99 MG/DL (ref 70–110)
PHOSPHATE SERPL-MCNC: 4.7 MG/DL (ref 2.7–4.5)
POCT GLUCOSE: 89 MG/DL (ref 70–110)
POCT GLUCOSE: 94 MG/DL (ref 70–110)
POTASSIUM SERPL-SCNC: 4.9 MMOL/L (ref 3.5–5.1)
SODIUM SERPL-SCNC: 140 MMOL/L (ref 136–145)

## 2023-09-12 PROCEDURE — 99900035 HC TECH TIME PER 15 MIN (STAT)

## 2023-09-12 PROCEDURE — 80069 RENAL FUNCTION PANEL: CPT

## 2023-09-12 PROCEDURE — 25000003 PHARM REV CODE 250

## 2023-09-12 PROCEDURE — 94761 N-INVAS EAR/PLS OXIMETRY MLT: CPT

## 2023-09-12 PROCEDURE — G0378 HOSPITAL OBSERVATION PER HR: HCPCS

## 2023-09-12 PROCEDURE — 36415 COLL VENOUS BLD VENIPUNCTURE: CPT

## 2023-09-12 RX ORDER — ENOXAPARIN SODIUM 100 MG/ML
40 INJECTION SUBCUTANEOUS EVERY 24 HOURS
Status: DISCONTINUED | OUTPATIENT
Start: 2023-09-12 | End: 2023-09-12 | Stop reason: HOSPADM

## 2023-09-12 RX ORDER — ATORVASTATIN CALCIUM 80 MG/1
80 TABLET, FILM COATED ORAL NIGHTLY
Qty: 90 TABLET | Refills: 3 | Status: SHIPPED | OUTPATIENT
Start: 2023-09-12 | End: 2023-10-25

## 2023-09-12 RX ORDER — ASPIRIN 81 MG/1
81 TABLET ORAL DAILY
Qty: 90 TABLET | Refills: 3 | Status: SHIPPED | OUTPATIENT
Start: 2023-09-12 | End: 2024-09-11

## 2023-09-12 RX ADMIN — AMLODIPINE BESYLATE 10 MG: 5 TABLET ORAL at 09:09

## 2023-09-12 RX ADMIN — METOPROLOL SUCCINATE 100 MG: 50 TABLET, EXTENDED RELEASE ORAL at 09:09

## 2023-09-12 RX ADMIN — ASPIRIN 325 MG: 325 TABLET ORAL at 09:09

## 2023-09-12 NOTE — PROGRESS NOTES
[facility]                    Pharmacy       Discharge Medication Education    Patient ACCEPTED medication education. Pharmacy has provided education on the name, indication, and possible side effects of the medication(s) prescribed, using teach-back method.     The following medications have also been discussed, during this admission.        Medication List        START taking these medications      aspirin 81 MG EC tablet  Commonly known as: ECOTRIN  Take 1 tablet (81 mg total) by mouth once daily.            CHANGE how you take these medications      atorvastatin 80 MG tablet  Commonly known as: LIPITOR  Take 1 tablet (80 mg total) by mouth every evening.  What changed:   medication strength  how much to take            CONTINUE taking these medications      allopurinoL 100 MG tablet  Commonly known as: ZYLOPRIM  Take 1 tablet (100 mg total) by mouth once daily.     amitriptyline 50 MG tablet  Commonly known as: ELAVIL     amLODIPine 10 MG tablet  Commonly known as: NORVASC  Take 1 tablet (10 mg total) by mouth once daily.     calcium citrate-vitamin D3 315-200 mg 315 mg-5 mcg (200 unit) per tablet  Commonly known as: CITRACAL+D     denosumab 60 mg/mL Syrg  Commonly known as: PROLIA  Inject 1 mL (60 mg total) into the skin every 6 (six) months.     fish oil-omega-3 fatty acids 300-1,000 mg capsule     lisinopriL 40 MG tablet  Commonly known as: PRINIVIL,ZESTRIL     metFORMIN 1000 MG tablet  Commonly known as: GLUCOPHAGE  Take 1 tablet (1,000 mg total) by mouth 2 (two) times daily with meals.     metoprolol succinate 100 MG 24 hr tablet  Commonly known as: TOPROL-XL  Take 1 tablet (100 mg total) by mouth once daily.     omega 3-dha-epa-fish oil 1,200 (144-216) mg Cap  Commonly known as: Fish OiL  Take 1 capsule by mouth once daily.     OZEMPIC 1 mg/dose (4 mg/3 mL)  Generic drug: semaglutide  Inject 1 mg into the skin every 7 days.     RAYALDEE 30 mcg Cs24  Generic drug: calcifediol  Take 30  mcg by mouth once daily.     sodium zirconium cyclosilicate 10 gram packet  Commonly known as: LOKELMA  Take 1 packet (10 g total) by mouth every Mon, Wed, Fri. Mix entire contents of packet(s) into drinking glass containing 3 tablespoons of water; stir well and drink immediately. Add water and repeat until no powder remains to receive entire dose.               Where to Get Your Medications        These medications were sent to [facility] [address], [place] [address]      Hours: Mon-Fri, 8a-5:30p Phone: [phone]   aspirin 81 MG EC tablet  atorvastatin 80 MG tablet          Thank you  Adeola Serra, PharmD  [phone]

## 2023-09-12 NOTE — PLAN OF CARE
VN note: VN completed AVS and attachments and notified bedside nurse, Samuel. Will cont to be available and intervene prn.

## 2023-09-12 NOTE — NURSING
Discharge orders noted. IV and tele box removed. AVS printed and given to patient. VN reviewed AVS with patient. Patient left via wheelchair with family. No distress noted.

## 2023-09-12 NOTE — NURSING
RAPID RESPONSE NURSE PROACTIVE ROUNDING NOTE       USGPIV insertion attempted. Unable to get access. Samuel RN notified. Pt laying in bed, bed in low position, call light in reach.

## 2023-09-12 NOTE — PROGRESS NOTES
"[facility] Resident Progress Note    Subjective:      Patient reports she feels well this morning and denies any further episodes of dysarthria or weakness. She is tolerating her diet without dysphagia.      Objective:   Last 24 Hour Vital Signs:  BP  Min: 104/54  Max: 133/60  Temp  Av.1 °F (36.2 °C)  Min: 96.4 °F (35.8 °C)  Max: 98 °F (36.7 °C)  Pulse  Av.4  Min: 70  Max: 87  Resp  Av  Min: 16  Max: 20  SpO2  Av.5 %  Min: 95 %  Max: 98 %  Height  Av' 6" (167.6 cm)  Min: 5' 6" (167.6 cm)  Max: 5' 6" (167.6 cm)  Weight  Av.8 kg (220 lb)  Min: 99.8 kg (220 lb)  Max: 99.8 kg (220 lb)  No intake/output data recorded.    Physical Examination:  Physical Exam  Vitals and nursing note reviewed. Exam conducted with a chaperone present.   Constitutional:       General: She is not in acute distress.     Appearance: Normal appearance. She is not ill-appearing, toxic-appearing or diaphoretic.   HENT:      Head: Normocephalic.      Right Ear: External ear normal.      Left Ear: External ear normal.      Nose: Nose normal.      Mouth/Throat:      Mouth: Mucous membranes are moist.      Pharynx: Oropharynx is clear.   Eyes:      Extraocular Movements: Extraocular movements intact.      Pupils: Pupils are equal, round, and reactive to light.   Cardiovascular:      Rate and Rhythm: Normal rate.      Pulses: Normal pulses.   Pulmonary:      Effort: Pulmonary effort is normal. No respiratory distress.      Breath sounds: No wheezing or rales.   Abdominal:      General: Abdomen is flat. There is no distension.      Palpations: Abdomen is soft. There is no mass.      Tenderness: There is no abdominal tenderness.   Musculoskeletal:         General: Normal range of motion.      Cervical back: Normal range of motion.   Skin:     General: Skin is warm.      Capillary Refill: Capillary refill takes less than 2 seconds.   Neurological:      General: No focal deficit present.      Mental Status: She is alert. Mental " status is at baseline.   Psychiatric:         Mood and Affect: Mood normal.         Laboratory:  Trended Lab Data:  Recent Labs   Lab 09/10/23  1952 09/11/23  0239 09/11/23  1643   WBC 10.13 9.99  --    HGB 11.7* 11.2*  --    HCT 35.7* 34.3*  --     193  --    MCV 96 95  --    RDW 13.0 13.2  --     138 139   K 5.2* 4.8 5.1    105 106   CO2 21* 23 24   BUN 27* 26* 22   CREATININE 2.55* 2.3* 1.9*   * 92 107   PROT 8.2 7.1  --    ALBUMIN 4.2 3.7  --    BILITOT 0.5 0.4  --    AST 24 11  --    ALKPHOS 73 75  --    ALT 13 8*  --        Microbiology Data Reviewed: yes    Radiology Data Reviewed: yes    Current Medications:     Infusions:       Scheduled:   amitriptyline  50 mg Oral QHS    amLODIPine  10 mg Oral Daily    aspirin  325 mg Oral Daily    atorvastatin  80 mg Oral QHS    enoxparin  30 mg Subcutaneous Daily    metoprolol succinate  100 mg Oral Daily    polyethylene glycol  17 g Oral Daily    sodium zirconium cyclosilicate  10 g Oral Every Mon, Wed, Fri        PRN:  dextrose 10%, dextrose 10%, glucagon (human recombinant), glucose, glucose, insulin aspart U-100, melatonin    Antibiotics and Day Number of Therapy:  None at this time    Lines and Day Number of Therapy:  PIV     Assessment:     Martha Sevilla is a 67 y.o.female with  Patient Active Problem List    Diagnosis Date Noted    Difficulty with speech 09/10/2023    TIA (transient ischemic attack) 09/10/2023    Stage 3b chronic kidney disease 07/07/2021    Persistent proteinuria 07/07/2021    Metabolic bone disease 07/07/2021    IgA nephropathy 07/07/2021    Vitamin D deficiency 07/07/2021    Hematuria     VICTORIA (acute kidney injury) 05/20/2021    Gross hematuria 05/20/2021    HTN (hypertension) 05/20/2021    History of ITP 05/20/2021    Morbid obesity with BMI of 40.0-44.9, adult 05/20/2021    Insomnia 05/20/2021    Normocytic anemia 05/20/2021    Arthritis of knee, left 03/18/2019    History of left knee replacement 03/18/2019         Plan:     Martha Sevilla is a 67 y.o.  female with history of  CKD4, DM type 2, HTN, HLD, Osteopenia, and secondary hyperparathyroidism who is being followed by the [facility] service at [facility] for evaluation of VICTORIA with concern for TIA.     Suspected TIA  Near Syncope  - Described weakness and lightheadedness w/ dysarthria for 2-3 minutes which quickly resolved  - Denies LOC  - CT Head w/ con negative for bleed  - EKG sinus with no st changes  - MRI showed no acute intracranial findings   - TTE within normal limits   - ABCD2 score 2 low risk  - no FND noted on exam, patient at baseline   - PT/OT consulted, no home recommendations  -  daily per ABCD2 score  - Atorvastatin 80     VICTORIA on CKD 4 vs worsening CKD status  - Follows with Nephrology, worsening kidney function within last year.  - sCr 2.55 with baseline around 1.7, improved to 2.3 with fluid resuscitation   - Denies any urinary retention   - FeNa 1.5%, suggestive of intrinsic injury however given patient's improvement with IVF, I suspect this is due to a combination of VICTORIA and worsening baseline renal function   - Repeat renal function panel pending      Hyperkalemia  - K 5.2 at admit, no EKG changes, improved this AM  - Patient on Lokelma at home, recently ran out  - Continue lokelma      HTN  - On Lisinopril 40, Amlodipine 10 at home  - holding lisinopril 40 in setting of VICTORIA     Secondary Hyperparathyroidism 2/2 renal dysfunction  - Follows with Nephrology for management   - Continue home Calcifediol      T2DM  - Last A1c 5.4 (8/15/2023)  - On Metforamin 1,000 BID and Ozempic at home   - SSI while inpatient     Normocytic Anemia   - Iron panel, ferritin normal on 8/15  - No evidence of hemolysis      HLD  - Last lipid Panel 7/21/22 ASCVD 14%  - Lipitor 10 on chart review, patient not taking  - Will increase lipitor to 80 in setting of possible TIA     Osteopenia   - Last Dexa 2/09/2022  - Received Prolia  injection       Fluids: PO   Nutrition: Renal Diet   PPx: Lovenox renal dosing   Code status: Full     Dispo: Likely discharge today.       Robert Carl MD   [facility], HO-1    [facility] Hospitalist Pager numbers:   [facility] Hospitalist Medicine Team A (Freddy/Mitzy): [phone]  [facility] Hospitalist Medicine Team B (Jenniffer/Jared):  [phone]

## 2023-09-12 NOTE — PLAN OF CARE
VN note: Care plan, orders and labs reviewed.  AVS and educational attachments shared with patient and daughter via [employer]. Discussed thoroughly. Patient and daughter verbalized clear understanding using teach back method. Notified bedside nurse of completion of education. At present no distress noted. Patient to be discharged via w/c with escort service and family with all of patient's belonings. Will cont to be available to patient and family and intervene prn.

## 2023-09-12 NOTE — PROGRESS NOTES
Pharmacist Renal Dose Adjustment Note    Martha Sevilla is a 67 y.o. female being treated with the medication enoxaparin    Patient Data:    Vital Signs (Most Recent):  Temp: 96.1 °F (35.6 °C) (09/12/23 0748)  Pulse: 80 (09/12/23 0748)  Resp: 18 (09/12/23 0748)  BP: (!) 103/56 (09/12/23 0748)  SpO2: 96 % (09/12/23 0748) Vital Signs (72h Range):  Temp:  [96.1 °F (35.6 °C)-98.7 °F (37.1 °C)]   Pulse:  [70-87]   Resp:  [16-23]   BP: (101-133)/(52-62)   SpO2:  [94 %-99 %]      Recent Labs   Lab 09/11/23  0239 09/11/23  1643 09/12/23  0716   CREATININE 2.3* 1.9* 1.7*     Serum creatinine: 1.7 mg/dL (H) 09/12/23 0716  Estimated creatinine clearance: 39.3 mL/min (A)    Medication:enoxaparin dose: 30mg frequency q24h will be changed to medication:enoxaparin dose:40mg frequency:q24h    Pharmacist's Name: Eloy Mesa  Pharmacist's Extension: 1410

## 2023-09-12 NOTE — DISCHARGE SUMMARY
"[facility] Hospital Medicine Discharge Summary    Primary Team: [facility] Hospitalist Team A  Attending Physician: Angela Hayes MD  Resident: Magnus  Intern: Siddhartha    Date of Admit: 9/10/2023  Date of Discharge: 9/12/2023    Discharge to: Home  Condition: Stable     Discharge Diagnoses     Patient Active Problem List   Diagnosis    Arthritis of knee, left    History of left knee replacement    VICTORIA (acute kidney injury)    Gross hematuria    HTN (hypertension)    History of ITP    Morbid obesity with BMI of 40.0-44.9, adult    Insomnia    Normocytic anemia    Hematuria    Stage 3b chronic kidney disease    Persistent proteinuria    Metabolic bone disease    IgA nephropathy    Vitamin D deficiency    Difficulty with speech    TIA (transient ischemic attack)     Consultants and Procedures     Consultants:  None    Procedures:   None    Brief History of Present Illness      HPI: Martha Sevilla is a 67 y.o. female with a PMH of CKD stage 4, T2DM, HTN, HLD, Osteopenia, and Secondary hyperparathyroidism who presented to [facility] on 9/10/2023 with a primary complaint of near syncope and aphasia for 2 minutes     Patient was in their normal state of health until earlier this afternoon when she stood up and walked 10ft to the otherside of her trailer to talk to her  and found she could not speak. Denies LOC. She describes she was trying to talk but "her mouth would not move" . Over the course of the next 2-3 minutes the dysarthria resolved. The dysarthria was associated with general weakness and light headedness. It was not associated with any vertigo, headaches, changes in vision or hearing, chest pains, SOB, nausea, or abdominal pains.  who witnessed the event states she had no facial droops, eye deviations, or any obvious deficits. After the dysarthria resolved, patient states she was completely back to baseline. At baseline patient ambulates on her own, can walk a couple of blocks without SOB. " Performs all ADLS.     Hospital Course: The patient was admitted and was worked up with CT head, MRI head, and TTE which were all not concerning for acute stroke. The patient received fluid resuscitation which improved her VICTORIA.     For the full HPI please refer to the History & Physical from this admission.    Hospital Course By Problem with Pertinent Findings     TIA  - Described weakness and lightheadedness w/ dysarthria for 2-3 minutes which quickly resolved  - CT Head w/ con negative for bleed  - EKG sinus with no st changes  - MRI showed no acute intracranial findings   - TTE within normal limits   - ABCD2 score 2 low risk  - PT/OT consulted, no home recommendations  - Patient at baseline neurologic status  - ASA 81 mg daily   - Atorvastatin 80 mg daily       VICTORIA on CKD 4 vs worsening CKD status  - Follows with Nephrology, worsening kidney function within last year.  - Creatinine 2.55 on admit with baseline around 1.7  - Creatinine improved to baseline with fluid resuscitation      Hyperkalemia  - K 5.2 at admit, no EKG changes on admission   - Patient on Lokelma at home, recently ran out  - Continued lokelma while inpatient with improvement of K  - Continue lokelma on discharge      HTN  - On Lisinopril 40, Amlodipine 10 at home  - Held lisinopril on admit due to VICTORIA  - Resume Lisinopril 40 mg on discharge   - Continue Amlodipine 10 mg on discharge     Secondary Hyperparathyroidism 2/2 renal dysfunction  - Follows with Nephrology for management   - Continue home Calcifediol      T2DM  - Last A1c 5.4 (8/15/2023)  - On Metforamin 1,000 BID and Ozempic at home   - SSI while inpatient     Normocytic Anemia   - Iron panel, ferritin normal on 8/15  - No evidence of hemolysis   - Stable     HLD  - Last lipid Panel 7/21/22 ASCVD 14%  - Lipitor 10 on chart review, patient not taking  - Increase lipitor to 80 in setting of possible TIA     Osteopenia   - Last Dexa 2/09/2022  - Received Prolia injection    Discharge  Medications     - Amitriptyline 50 mg QHS   - Amlodipine 10 mg daily   - Aspirin 81 mg daily   - Lipitor 80 mg daily   - Metoprolol succinate 100 mg daily   - Lokelma 10 g MWF    Discharge Information:   Diet:  Renal    Physical Activity:  As tolerated by patient.              Instructions:  1. Take all medications as prescribed  2. Keep all follow-up appointments  3. Return to the hospital or call your primary care physicians if any worsening symptoms such as fever, chest pain, shortness of breath, return of symptoms, or any other concerns.    Follow-Up Appointments:  PCP, Neurology, Nephrology     Robert Carl MD   [facility], -1

## 2023-09-12 NOTE — NURSING
Patient AAOX4 vitally stable on on room air, no ditress noted. Patient educated on fall risk, call light, and verbalize understanding. Patient's bed in lowest position with call light wihtin reach.

## 2023-09-12 NOTE — PLAN OF CARE
Problem: Adult Inpatient Plan of Care  Goal: Plan of Care Review  Outcome: Ongoing, Progressing  Goal: Patient-Specific Goal (Individualized)  Outcome: Ongoing, Progressing  Goal: Absence of Hospital-Acquired Illness or Injury  Outcome: Ongoing, Progressing  Goal: Optimal Comfort and Wellbeing  Outcome: Ongoing, Progressing  Goal: Readiness for Transition of Care  Outcome: Ongoing, Progressing     Problem: Skin Injury Risk Increased  Goal: Skin Health and Integrity  Outcome: Met     Problem: Fall Injury Risk  Goal: Absence of Fall and Fall-Related Injury  Outcome: Met

## 2023-09-12 NOTE — PLAN OF CARE
Pt is cleared to d/c home today. Pt will have family transport her home at time of d/c. SW requested f/u appts for pt. Rounds completed on pt. All questions addressed. Bedside nurse to discuss d/c medications. Discussed importance to attend all f/u appts and take medications as prescribed. Verbalized understanding. Case Management to sign off.     ALICIA Lima  [phone]    Future Appointments   Date Time Provider Department Center   11/22/2023 11:45 AM Mariana Cardona MD [facility]        09/12/23 1517   Final Note   Assessment Type Final Discharge Note   Anticipated Discharge Disposition Home   What phone number can be called within the next 1-3 days to see how you are doing after discharge? [phone]   Post-Acute Status   Discharge Delays None known at this time

## 2023-09-12 NOTE — ANESTHESIA PROCEDURE NOTES
Peripheral IV Insertion    Diagnosis: I99.8 Other disorder of circulatory system    Patient location during procedure: floor  Procedure start time: 9/11/2023 7:44 PM  Timeout: 9/11/2023 7:43 PM  Procedure end time: 9/11/2023 7:47 PM    Staffing  Authorizing Provider: Constantino Medel Jr., MD  Performing Provider: Constantino Medel Jr., MD    Staffing  Performed by: Constantino Medel Jr., MD  Authorized by: Constantino Medel Jr., MD    Anesthesiologist was present at the time of the procedure.    Preanesthetic Checklist  Completed: patient identified, IV checked, risks and benefits discussed, monitors and equipment checked and timeout performedPeripheral IV Insertion  Skin Prep: chlorhexidine gluconate  Local Infiltration: none  Orientation: right  Location: antecubital  Catheter Type: peripheral IV (single lumen)  Catheter Size: 18 G  Catheter placement by Ultrasound guidance. Heme positive aspiration all ports.   Vessel Caliber: medium, patent, compressibility normal  Vascular Doppler:  not done  Needle advanced into vessel with real time Ultrasound guidance.  Sterile sheath used.Insertion Attempts: 1  Assessment  Dressing: secured with tape and tegaderm  Patient: Tolerated well  Line flushed easily.

## 2023-10-10 ENCOUNTER — TELEPHONE (OUTPATIENT)
Dept: NEUROLOGY | Facility: CLINIC | Age: 68
End: 2023-10-10
Payer: MEDICARE

## 2023-10-10 NOTE — TELEPHONE ENCOUNTER
Called and scheduled patient with Dr. Lord 10/25. Patient confirmed appointment date/time/location. Appointment letter sent.

## 2023-10-10 NOTE — TELEPHONE ENCOUNTER
----- Message -----  From: Dunia Lebron  Sent: 9/12/2023   4:36 PM CDT  To: Nirav Pappas MA  Subject: RE: HFU                                          From reading notes, I believe she should be good to October.  Just let me know once scheduled.    Patient is back at baseline and no PT/OT at home needed.    Discharge summary notes related to TIA below if you need additional information.    Described weakness and lightheadedness w/ dysarthria for 2-3 minutes which quickly resolved  - CT Head w/ con negative for bleed  - EKG sinus with no st changes  - MRI showed no acute intracranial findings   - TTE within normal limits   - ABCD2 score 2 low risk    ----- Message -----  From: Nirav Pappas MA  Sent: 9/12/2023   4:27 PM CDT  To: Dunia Lebron  Subject: RE: HFU                                          Fyi currently waiting for October scheduled to open up as we are booked pretty solid the rest of the month barring one or two slots. How urgent is this?  ----- Message -----  From: Dunia Lebron  Sent: 9/12/2023   4:07 PM CDT  To: #  Subject: HFU                                              Patient will be discharging from [facility] and a HFU was requested with Neurology by DC provider.  Please schedule and message me back so DC can relay appointment information to patient prior to discharge. A referral has been entered.      DX: TIA (transient ischemic attack)    Chikis Duff  Physician Referral Specialist/Discharge

## 2023-10-25 ENCOUNTER — OFFICE VISIT (OUTPATIENT)
Dept: NEUROLOGY | Facility: CLINIC | Age: 68
End: 2023-10-25
Payer: MEDICARE

## 2023-10-25 VITALS
SYSTOLIC BLOOD PRESSURE: 98 MMHG | DIASTOLIC BLOOD PRESSURE: 62 MMHG | WEIGHT: 220 LBS | HEART RATE: 83 BPM | BODY MASS INDEX: 35.36 KG/M2 | HEIGHT: 66 IN

## 2023-10-25 DIAGNOSIS — N18.32 STAGE 3B CHRONIC KIDNEY DISEASE: ICD-10-CM

## 2023-10-25 DIAGNOSIS — E66.01 MORBID OBESITY WITH BMI OF 40.0-44.9, ADULT: ICD-10-CM

## 2023-10-25 DIAGNOSIS — G45.9 TRANSIENT CEREBRAL ISCHEMIA, UNSPECIFIED TYPE: Primary | ICD-10-CM

## 2023-10-25 DIAGNOSIS — G45.9 TIA (TRANSIENT ISCHEMIC ATTACK): ICD-10-CM

## 2023-10-25 DIAGNOSIS — N02.B9 IGA NEPHROPATHY: ICD-10-CM

## 2023-10-25 PROBLEM — R47.9 DIFFICULTY WITH SPEECH: Status: RESOLVED | Noted: 2023-09-10 | Resolved: 2023-10-25

## 2023-10-25 PROBLEM — N17.9 AKI (ACUTE KIDNEY INJURY): Status: RESOLVED | Noted: 2021-05-20 | Resolved: 2023-10-25

## 2023-10-25 PROCEDURE — 99204 OFFICE O/P NEW MOD 45 MIN: CPT | Mod: S$GLB,,, | Performed by: PSYCHIATRY & NEUROLOGY

## 2023-10-25 PROCEDURE — 1125F AMNT PAIN NOTED PAIN PRSNT: CPT | Mod: CPTII,S$GLB,, | Performed by: PSYCHIATRY & NEUROLOGY

## 2023-10-25 PROCEDURE — 3008F PR BODY MASS INDEX (BMI) DOCUMENTED: ICD-10-PCS | Mod: CPTII,S$GLB,, | Performed by: PSYCHIATRY & NEUROLOGY

## 2023-10-25 PROCEDURE — 1101F PR PT FALLS ASSESS DOC 0-1 FALLS W/OUT INJ PAST YR: ICD-10-PCS | Mod: CPTII,S$GLB,, | Performed by: PSYCHIATRY & NEUROLOGY

## 2023-10-25 PROCEDURE — 1101F PT FALLS ASSESS-DOCD LE1/YR: CPT | Mod: CPTII,S$GLB,, | Performed by: PSYCHIATRY & NEUROLOGY

## 2023-10-25 PROCEDURE — 3078F DIAST BP <80 MM HG: CPT | Mod: CPTII,S$GLB,, | Performed by: PSYCHIATRY & NEUROLOGY

## 2023-10-25 PROCEDURE — 1157F ADVNC CARE PLAN IN RCRD: CPT | Mod: CPTII,S$GLB,, | Performed by: PSYCHIATRY & NEUROLOGY

## 2023-10-25 PROCEDURE — 3074F PR MOST RECENT SYSTOLIC BLOOD PRESSURE < 130 MM HG: ICD-10-PCS | Mod: CPTII,S$GLB,, | Performed by: PSYCHIATRY & NEUROLOGY

## 2023-10-25 PROCEDURE — 3066F NEPHROPATHY DOC TX: CPT | Mod: CPTII,S$GLB,, | Performed by: PSYCHIATRY & NEUROLOGY

## 2023-10-25 PROCEDURE — 1157F PR ADVANCE CARE PLAN OR EQUIV PRESENT IN MEDICAL RECORD: ICD-10-PCS | Mod: CPTII,S$GLB,, | Performed by: PSYCHIATRY & NEUROLOGY

## 2023-10-25 PROCEDURE — 3008F BODY MASS INDEX DOCD: CPT | Mod: CPTII,S$GLB,, | Performed by: PSYCHIATRY & NEUROLOGY

## 2023-10-25 PROCEDURE — 3061F NEG MICROALBUMINURIA REV: CPT | Mod: CPTII,S$GLB,, | Performed by: PSYCHIATRY & NEUROLOGY

## 2023-10-25 PROCEDURE — 1125F PR PAIN SEVERITY QUANTIFIED, PAIN PRESENT: ICD-10-PCS | Mod: CPTII,S$GLB,, | Performed by: PSYCHIATRY & NEUROLOGY

## 2023-10-25 PROCEDURE — 99999 PR PBB SHADOW E&M-EST. PATIENT-LVL V: ICD-10-PCS | Mod: PBBFAC,,, | Performed by: PSYCHIATRY & NEUROLOGY

## 2023-10-25 PROCEDURE — 3288F FALL RISK ASSESSMENT DOCD: CPT | Mod: CPTII,S$GLB,, | Performed by: PSYCHIATRY & NEUROLOGY

## 2023-10-25 PROCEDURE — 4010F PR ACE/ARB THEARPY RXD/TAKEN: ICD-10-PCS | Mod: CPTII,S$GLB,, | Performed by: PSYCHIATRY & NEUROLOGY

## 2023-10-25 PROCEDURE — 3078F PR MOST RECENT DIASTOLIC BLOOD PRESSURE < 80 MM HG: ICD-10-PCS | Mod: CPTII,S$GLB,, | Performed by: PSYCHIATRY & NEUROLOGY

## 2023-10-25 PROCEDURE — 3288F PR FALLS RISK ASSESSMENT DOCUMENTED: ICD-10-PCS | Mod: CPTII,S$GLB,, | Performed by: PSYCHIATRY & NEUROLOGY

## 2023-10-25 PROCEDURE — 99204 PR OFFICE/OUTPT VISIT, NEW, LEVL IV, 45-59 MIN: ICD-10-PCS | Mod: S$GLB,,, | Performed by: PSYCHIATRY & NEUROLOGY

## 2023-10-25 PROCEDURE — 99999 PR PBB SHADOW E&M-EST. PATIENT-LVL V: CPT | Mod: PBBFAC,,, | Performed by: PSYCHIATRY & NEUROLOGY

## 2023-10-25 PROCEDURE — 3061F PR NEG MICROALBUMINURIA RESULT DOCUMENTED/REVIEW: ICD-10-PCS | Mod: CPTII,S$GLB,, | Performed by: PSYCHIATRY & NEUROLOGY

## 2023-10-25 PROCEDURE — 3044F HG A1C LEVEL LT 7.0%: CPT | Mod: CPTII,S$GLB,, | Performed by: PSYCHIATRY & NEUROLOGY

## 2023-10-25 PROCEDURE — 3066F PR DOCUMENTATION OF TREATMENT FOR NEPHROPATHY: ICD-10-PCS | Mod: CPTII,S$GLB,, | Performed by: PSYCHIATRY & NEUROLOGY

## 2023-10-25 PROCEDURE — 3044F PR MOST RECENT HEMOGLOBIN A1C LEVEL <7.0%: ICD-10-PCS | Mod: CPTII,S$GLB,, | Performed by: PSYCHIATRY & NEUROLOGY

## 2023-10-25 PROCEDURE — 1159F PR MEDICATION LIST DOCUMENTED IN MEDICAL RECORD: ICD-10-PCS | Mod: CPTII,S$GLB,, | Performed by: PSYCHIATRY & NEUROLOGY

## 2023-10-25 PROCEDURE — 1159F MED LIST DOCD IN RCRD: CPT | Mod: CPTII,S$GLB,, | Performed by: PSYCHIATRY & NEUROLOGY

## 2023-10-25 PROCEDURE — 3074F SYST BP LT 130 MM HG: CPT | Mod: CPTII,S$GLB,, | Performed by: PSYCHIATRY & NEUROLOGY

## 2023-10-25 PROCEDURE — 4010F ACE/ARB THERAPY RXD/TAKEN: CPT | Mod: CPTII,S$GLB,, | Performed by: PSYCHIATRY & NEUROLOGY

## 2023-10-25 RX ORDER — FUROSEMIDE 40 MG/1
40-80 TABLET ORAL
COMMUNITY
Start: 2023-10-01 | End: 2024-02-12 | Stop reason: SDUPTHER

## 2023-10-25 RX ORDER — PATIROMER 8.4 G/1
8.4 POWDER, FOR SUSPENSION ORAL
COMMUNITY
Start: 2023-10-13 | End: 2023-11-22

## 2023-10-25 RX ORDER — ATORVASTATIN CALCIUM 40 MG/1
40 TABLET, FILM COATED ORAL DAILY
Qty: 90 TABLET | Refills: 3 | Status: SHIPPED | OUTPATIENT
Start: 2023-10-25 | End: 2024-10-24

## 2023-10-25 NOTE — PATIENT INSTRUCTIONS
- Continue aspirin 81 mg daily for stroke prevention  - MR scan of blood vessels  - Reduce atorvastatin to 40 mg nightly  - Continue good blood pressure control  - Drink plenty of water      Mediterranean Diet Recommendations    Eat primarily plant-based foods, such as fruits and vegetables, whole grains, legumes (beans) and nuts.  Limit refined carbohydrates (white pasta, bread, rice).  Replace butter with healthy fats such as olive oil.  Use herbs and spices instead of salt to flavor foods.  Limit red meat and processed meats to no more than a few times a month.  Avoid sugary sodas, bakery goods, and sweets.  Eat fish and poultry at least twice a week.  Get plenty of exercise (150 minutes per week).    Adopted from Javier jessica al, NEJM, 2018.

## 2023-10-25 NOTE — PROGRESS NOTES
Vascular Neurology  Clinic Note    Reason For Visit (Chief Complaint): TIA    HPI: 67 y.o. left handed woman with PMH CKD Stage 3, HTN, IgA nephropathy, T2DM, presents after recent hospitalization for transient neurological symptoms.    Patient presented to [facility] with difficulty speaking for 2-3 min.  She also had generalized weakness and lightheadness.  Mri negativef or stroke.  Noted to have VICTORIA.  No vessel imaging done.  Patient discharged with ASA and increased dose of atorvastatin.    Doing well since discharge.  No recurrent symptoms.  Compliant with aspirin.  Occasional lightheadedness when standing up.  Drinking plenty of water.        Brain Imaging:  MRI Brain 9/11/23:  No acute intracranial abnormality.  Mild periventricular white matter T2 FLAIR hyperintensity, nonspecific though can be seen with sequela chronic microangiopathic change.       Cardiac Evaluation:  TTE 9/11/23:  LVEF 65%  No PFO      Relevant Labwork:  Recent Labs   Lab 08/15/23  1048 09/11/23  0239   Hemoglobin A1C 5.4  --    LDL Cholesterol  --  52.8 L   HDL  --  34 L   Triglycerides  --  146   Cholesterol  --  116 L       I independently viewed the above imaging studies in addition to reviewing the report.  I reviewed the above labwork.    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  No EtOH.      Medication List with Changes/Refills   New Medications    ATORVASTATIN (LIPITOR) 40 MG TABLET    Take 1 tablet (40 mg total) by mouth once daily.   Current Medications    ALLOPURINOL (ZYLOPRIM) 100 MG TABLET    Take 1 tablet (100 mg total) by mouth once daily.    AMITRIPTYLINE (ELAVIL) 50 MG TABLET    Take 50 mg by mouth every evening.    AMLODIPINE (NORVASC) 10 MG TABLET    Take 1 tablet (10 mg total) by mouth once daily.    ASPIRIN (ECOTRIN) 81 MG EC TABLET    Take 1 tablet (81 mg total) by mouth once daily.    CALCIFEDIOL (RAYALDEE) 30 MCG CS24    Take 30 mcg by mouth once daily.    CALCIUM CITRATE-VITAMIN D3 315-200 MG (CITRACAL+D) 315 MG-5 MCG (200 UNIT) PER TABLET    Take 1 tablet by mouth 2 (two) times daily.    DENOSUMAB (PROLIA) 60 MG/ML SYRG    Inject 1 mL (60 mg total) into the skin every 6 (six) months.    FUROSEMIDE (LASIX) 40 MG TABLET    Take 40-80 mg by mouth.    LISINOPRIL (PRINIVIL,ZESTRIL) 40 MG TABLET    Take 1 tablet (40 mg total) by mouth as needed.    METFORMIN (GLUCOPHAGE) 1000 MG TABLET    Take 1 tablet (1,000 mg total) by mouth 2 (two) times daily with meals.    METOPROLOL SUCCINATE (TOPROL-XL) 100 MG 24 HR TABLET    Take 1 tablet (100 mg total) by mouth once daily.    OMEGA 3-DHA-EPA-FISH OIL (FISH OIL) 1,200 (144-216) MG CAP    Take 1 capsule by mouth once daily.    OMEGA-3 FATTY ACIDS/FISH OIL (FISH OIL-OMEGA-3 FATTY ACIDS) 300-1,000 MG CAPSULE    Take by mouth once daily.    SEMAGLUTIDE (OZEMPIC) 1 MG/DOSE (4 MG/3 ML)    Inject 1 mg into the skin every 7 days.    SODIUM ZIRCONIUM CYCLOSILICATE (LOKELMA) 10 GRAM PACKET    Take 1 packet (10 g total) by mouth every Mon, Wed, Fri. Mix entire contents of packet(s) into drinking glass containing 3 tablespoons of water; stir well and drink immediately. Add water and repeat until no powder remains to receive entire dose.    VELTASSA 8.4 GRAM  "PWPK    Take 8.4 g by mouth.   Discontinued Medications    ATORVASTATIN (LIPITOR) 80 MG TABLET    Take 1 tablet (80 mg total) by mouth every evening.       EXAM  Vital Signs:Blood pressure 98/62, pulse 83, height 5' 6" (1.676 m), weight 99.8 kg (220 lb 0.3 oz).  General: well appearing without discomfort   Neck: no carotid bruits  CV: RRR, nL S1&S2  Resp: breathing comfortably, no wheezing  Ext: wwp, no pedal edema    Mental Status: Alert and oriented, normal attention, speech fluent and prosodic, naming and repetition intact, follows multistep embedded commands, no e/o neglect or extinction  Cranial Nerves: PERRL, EOMI, VFF, sensation intact, face symmetric, TUP midline, SCM/trap 5/5  Motor: Normal bulk and tone, no drift, finger taps symmetric  Strength 5/5 throughout  Sensory: intact light touch bilaterally, intact proprioception bilaterally  Coordination: no ataxia on finger-to-nose or heel-to-shin testing; no truncal ataxia  Gait & Stance: normal  DTR: 2+ symmetric    NIHSS - 0    ___________________  ASSESSMENT & PLAN   67 y.o. left handed woman with PMH CKD Stage 3, HTN, IgA nephropathy, T2DM, presents after recent hospitalization for transient neurological symptoms.  Likely TIA - isolated aphasia.  Needs vessel imaging.  Out of the 30d window - will not add Plavix.      - Continue ASA 81 mg qd for secondary stroke prevention  - Reduce atorvastatin to 40 mg qhs.  LDL at goal  - BP low: continue to monitor.  - Obesity: on Ozempic, Mediterranean Diet for stroke prevention  - MRA head/neck  - Return to ED for any acute neuro symptoms  - RTC PRN      Problem List Items Addressed This Visit          Unprioritized    Morbid obesity with BMI of 40.0-44.9, adult    Stage 3b chronic kidney disease    IgA nephropathy    TIA (transient ischemic attack)     Other Visit Diagnoses       Transient cerebral ischemia, unspecified type    -  Primary    Relevant Orders    MRA Brain    MRA Neck without contrast            Corrie " MITZI Lord MD  Vascular Neurology

## 2023-11-07 ENCOUNTER — HOSPITAL ENCOUNTER (OUTPATIENT)
Dept: RADIOLOGY | Facility: HOSPITAL | Age: 68
Discharge: HOME | End: 2023-11-07
Attending: PSYCHIATRY & NEUROLOGY
Payer: MEDICARE

## 2023-11-07 DIAGNOSIS — G45.9 TRANSIENT CEREBRAL ISCHEMIA, UNSPECIFIED TYPE: ICD-10-CM

## 2023-11-07 PROCEDURE — 70547 MR ANGIOGRAPHY NECK W/O DYE: CPT | Mod: TC

## 2023-11-07 PROCEDURE — 70544 MRA BRAIN WITHOUT CONTRAST: ICD-10-PCS | Mod: 26,,, | Performed by: RADIOLOGY

## 2023-11-07 PROCEDURE — 70544 MR ANGIOGRAPHY HEAD W/O DYE: CPT | Mod: TC

## 2023-11-07 PROCEDURE — 70547 MRA NECK WITHOUT CONTRAST: ICD-10-PCS | Mod: 26,,, | Performed by: RADIOLOGY

## 2023-11-07 PROCEDURE — 70544 MR ANGIOGRAPHY HEAD W/O DYE: CPT | Mod: 26,,, | Performed by: RADIOLOGY

## 2023-11-07 PROCEDURE — 70547 MR ANGIOGRAPHY NECK W/O DYE: CPT | Mod: 26,,, | Performed by: RADIOLOGY

## 2023-11-20 ENCOUNTER — LAB VISIT (OUTPATIENT)
Dept: LAB | Facility: HOSPITAL | Age: 68
End: 2023-11-20
Attending: INTERNAL MEDICINE
Payer: MEDICARE

## 2023-11-20 DIAGNOSIS — N18.4 ANEMIA IN STAGE 4 CHRONIC KIDNEY DISEASE: ICD-10-CM

## 2023-11-20 DIAGNOSIS — D63.1 ANEMIA IN STAGE 4 CHRONIC KIDNEY DISEASE: ICD-10-CM

## 2023-11-20 DIAGNOSIS — N18.4 CKD (CHRONIC KIDNEY DISEASE) STAGE 4, GFR 15-29 ML/MIN: ICD-10-CM

## 2023-11-20 DIAGNOSIS — E09.9 STEROID-INDUCED DIABETES MELLITUS, SUBSEQUENT ENCOUNTER: ICD-10-CM

## 2023-11-20 DIAGNOSIS — T38.0X5D STEROID-INDUCED DIABETES MELLITUS, SUBSEQUENT ENCOUNTER: ICD-10-CM

## 2023-11-20 DIAGNOSIS — N25.81 SECONDARY HYPERPARATHYROIDISM OF RENAL ORIGIN: ICD-10-CM

## 2023-11-20 LAB
25(OH)D3+25(OH)D2 SERPL-MCNC: 87 NG/ML (ref 30–96)
ALBUMIN SERPL BCP-MCNC: 4.1 G/DL (ref 3.5–5.2)
ANION GAP SERPL CALC-SCNC: 11 MMOL/L (ref 8–16)
BASOPHILS # BLD AUTO: 0.06 K/UL (ref 0–0.2)
BASOPHILS NFR BLD: 0.5 % (ref 0–1.9)
BILIRUB UR QL STRIP: NEGATIVE
CALCIUM SERPL-MCNC: 9.5 MG/DL (ref 8.7–10.5)
CHLORIDE SERPL-SCNC: 102 MMOL/L (ref 95–110)
CLARITY UR REFRACT.AUTO: CLEAR
CO2 SERPL-SCNC: 26 MMOL/L (ref 23–29)
COLOR UR AUTO: YELLOW
CREAT SERPL-MCNC: 1.62 MG/DL (ref 0.5–1.4)
CREAT UR-MCNC: 63.7 MG/DL (ref 15–325)
DIFFERENTIAL METHOD: ABNORMAL
EOSINOPHIL # BLD AUTO: 0.5 K/UL (ref 0–0.5)
EOSINOPHIL NFR BLD: 4.3 % (ref 0–8)
ERYTHROCYTE [DISTWIDTH] IN BLOOD BY AUTOMATED COUNT: 12.4 % (ref 11.5–14.5)
EST. GFR  (NO RACE VARIABLE): 35 ML/MIN/1.73 M^2
ESTIMATED AVG GLUCOSE: 111 MG/DL (ref 68–131)
FERRITIN SERPL-MCNC: 226 NG/ML (ref 20–300)
GLUCOSE SERPL-MCNC: 113 MG/DL (ref 70–110)
GLUCOSE UR QL STRIP: NEGATIVE
HBA1C MFR BLD: 5.5 % (ref 4–5.6)
HCT VFR BLD AUTO: 34.7 % (ref 37–48.5)
HGB BLD-MCNC: 11.3 G/DL (ref 12–16)
HGB UR QL STRIP: ABNORMAL
IMM GRANULOCYTES # BLD AUTO: 0.03 K/UL (ref 0–0.04)
IMM GRANULOCYTES NFR BLD AUTO: 0.3 % (ref 0–0.5)
IRON SERPL-MCNC: 24 UG/DL (ref 30–160)
KETONES UR QL STRIP: NEGATIVE
LEUKOCYTE ESTERASE UR QL STRIP: NEGATIVE
LYMPHOCYTES # BLD AUTO: 2.4 K/UL (ref 1–4.8)
LYMPHOCYTES NFR BLD: 21.2 % (ref 18–48)
MAGNESIUM SERPL-MCNC: 1.7 MG/DL (ref 1.6–2.6)
MCH RBC QN AUTO: 31 PG (ref 27–31)
MCHC RBC AUTO-ENTMCNC: 32.6 G/DL (ref 32–36)
MCV RBC AUTO: 95 FL (ref 82–98)
MONOCYTES # BLD AUTO: 0.9 K/UL (ref 0.3–1)
MONOCYTES NFR BLD: 8.1 % (ref 4–15)
NEUTROPHILS # BLD AUTO: 7.5 K/UL (ref 1.8–7.7)
NEUTROPHILS NFR BLD: 65.6 % (ref 38–73)
NITRITE UR QL STRIP: NEGATIVE
NRBC BLD-RTO: 0 /100 WBC
PH UR STRIP: 6 [PH] (ref 5–8)
PHOSPHATE SERPL-MCNC: 4 MG/DL (ref 2.7–4.5)
PLATELET # BLD AUTO: 252 K/UL (ref 150–450)
PMV BLD AUTO: 10.5 FL (ref 9.2–12.9)
POTASSIUM SERPL-SCNC: 4.7 MMOL/L (ref 3.5–5.1)
PROT UR QL STRIP: NEGATIVE
PROT UR-MCNC: <7 MG/DL (ref 0–15)
PROT/CREAT UR: NORMAL MG/G{CREAT} (ref 0–0.2)
PTH-INTACT SERPL-MCNC: 71 PG/ML (ref 9–77)
RBC # BLD AUTO: 3.64 M/UL (ref 4–5.4)
SATURATED IRON: 8 % (ref 20–50)
SODIUM SERPL-SCNC: 139 MMOL/L (ref 136–145)
SP GR UR STRIP: 1.02 (ref 1–1.03)
TOTAL IRON BINDING CAPACITY: 302 UG/DL (ref 250–450)
TRANSFERRIN SERPL-MCNC: 204 MG/DL (ref 200–375)
URATE SERPL-MCNC: 6.9 MG/DL (ref 2.4–5.7)
URN SPEC COLLECT METH UR: ABNORMAL
UROBILINOGEN UR STRIP-ACNC: NEGATIVE EU/DL
UUN UR-MCNC: 19 MG/DL (ref 7–17)
WBC # BLD AUTO: 11.43 K/UL (ref 3.9–12.7)

## 2023-11-20 PROCEDURE — 82728 ASSAY OF FERRITIN: CPT | Performed by: INTERNAL MEDICINE

## 2023-11-20 PROCEDURE — 36415 COLL VENOUS BLD VENIPUNCTURE: CPT | Mod: PN | Performed by: INTERNAL MEDICINE

## 2023-11-20 PROCEDURE — 85025 COMPLETE CBC W/AUTO DIFF WBC: CPT | Mod: PN | Performed by: INTERNAL MEDICINE

## 2023-11-20 PROCEDURE — 83540 ASSAY OF IRON: CPT | Mod: PN | Performed by: INTERNAL MEDICINE

## 2023-11-20 PROCEDURE — 83970 ASSAY OF PARATHORMONE: CPT | Mod: PN | Performed by: INTERNAL MEDICINE

## 2023-11-20 PROCEDURE — 84550 ASSAY OF BLOOD/URIC ACID: CPT | Performed by: INTERNAL MEDICINE

## 2023-11-20 PROCEDURE — 83735 ASSAY OF MAGNESIUM: CPT | Mod: PN | Performed by: INTERNAL MEDICINE

## 2023-11-20 PROCEDURE — 83036 HEMOGLOBIN GLYCOSYLATED A1C: CPT | Performed by: INTERNAL MEDICINE

## 2023-11-20 PROCEDURE — 84466 ASSAY OF TRANSFERRIN: CPT | Mod: PN | Performed by: INTERNAL MEDICINE

## 2023-11-20 PROCEDURE — 84156 ASSAY OF PROTEIN URINE: CPT | Performed by: INTERNAL MEDICINE

## 2023-11-20 PROCEDURE — 80069 RENAL FUNCTION PANEL: CPT | Mod: PN | Performed by: INTERNAL MEDICINE

## 2023-11-20 PROCEDURE — 81003 URINALYSIS AUTO W/O SCOPE: CPT | Mod: PN | Performed by: INTERNAL MEDICINE

## 2023-11-20 PROCEDURE — 82306 VITAMIN D 25 HYDROXY: CPT | Mod: PN | Performed by: INTERNAL MEDICINE

## 2023-12-11 PROBLEM — G45.9 TIA (TRANSIENT ISCHEMIC ATTACK): Status: RESOLVED | Noted: 2023-09-10 | Resolved: 2023-12-11

## 2024-01-04 ENCOUNTER — HOSPITAL ENCOUNTER (EMERGENCY)
Facility: HOSPITAL | Age: 69
Discharge: HOME | End: 2024-01-04
Attending: EMERGENCY MEDICINE
Payer: MEDICARE

## 2024-01-04 VITALS
OXYGEN SATURATION: 100 % | HEIGHT: 66 IN | RESPIRATION RATE: 16 BRPM | HEART RATE: 80 BPM | TEMPERATURE: 98 F | WEIGHT: 220 LBS | BODY MASS INDEX: 35.36 KG/M2 | DIASTOLIC BLOOD PRESSURE: 56 MMHG | SYSTOLIC BLOOD PRESSURE: 113 MMHG

## 2024-01-04 DIAGNOSIS — S09.90XA INJURY OF HEAD, INITIAL ENCOUNTER: ICD-10-CM
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
## 2024-01-04 PROCEDURE — 99284 EMERGENCY DEPT VISIT MOD MDM: CPT | Mod: 25

## 2024-01-04 PROCEDURE — 25000003 PHARM REV CODE 250

## 2024-01-04 RX ORDER — METHOCARBAMOL 500 MG/1
500 TABLET, FILM COATED ORAL 4 TIMES DAILY
Qty: 40 TABLET | Refills: 0 | Status: SHIPPED | OUTPATIENT
Start: 2024-01-04 | End: 2024-01-14

## 2024-01-04 RX ORDER — ACETAMINOPHEN 500 MG
1000 TABLET ORAL
Status: COMPLETED | OUTPATIENT
Start: 2024-01-04 | End: 2024-01-04

## 2024-01-04 RX ORDER — LIDOCAINE 50 MG/G
1 PATCH TOPICAL DAILY
Qty: 6 PATCH | Refills: 0 | Status: SHIPPED | OUTPATIENT
Start: 2024-01-04

## 2024-01-04 RX ADMIN — ACETAMINOPHEN 1000 MG: 500 TABLET ORAL at 04:01
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
## 2024-01-04 NOTE — DISCHARGE INSTRUCTIONS
Thank you for letting me care for you today! It was nice meeting you, and I hope you feel better soon.   If you would like access to your chart and what was done today please utilize the [facility].   Please don't hesitate to return if your symptoms worsen or you develop any other worrisome symptoms.    Our goal in the emergency department is to always give you outstanding care and exceptional service. You may receive a survey by mail or e-mail in the next week regarding your experience in our ED. We would greatly appreciate you completing and returning the survey. Your feedback provides us with a way to recognize our staff who give very good care and it helps us learn how to improve when your experience was below our aspiration of excellence.     Sincerely,    Tricia Lechuga PA-C  Emergency Department Physician Assistant  [facility], [place], and [place]
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
## 2024-03-18 ENCOUNTER — LAB VISIT (OUTPATIENT)
Dept: LAB | Facility: HOSPITAL | Age: 69
End: 2024-03-18
Attending: INTERNAL MEDICINE
Payer: MEDICARE

## 2024-03-18 DIAGNOSIS — N18.4 CKD (CHRONIC KIDNEY DISEASE) STAGE 4, GFR 15-29 ML/MIN: ICD-10-CM

## 2024-03-18 LAB
ALBUMIN/CREAT UR: 16.4 UG/MG (ref 0–30)
BACTERIA #/AREA URNS AUTO: ABNORMAL /HPF
BILIRUB UR QL STRIP: NEGATIVE
CLARITY UR REFRACT.AUTO: CLEAR
COLOR UR AUTO: YELLOW
CREAT UR-MCNC: 55 MG/DL (ref 15–325)
CREAT UR-MCNC: 55 MG/DL (ref 15–325)
GLUCOSE UR QL STRIP: NEGATIVE
HGB UR QL STRIP: NEGATIVE
KETONES UR QL STRIP: NEGATIVE
LEUKOCYTE ESTERASE UR QL STRIP: ABNORMAL
MICROALBUMIN UR DL<=1MG/L-MCNC: 9 UG/ML
MICROSCOPIC COMMENT: ABNORMAL
NITRITE UR QL STRIP: NEGATIVE
PH UR STRIP: 6 [PH] (ref 5–8)
PROT UR QL STRIP: NEGATIVE
PROT UR-MCNC: 7 MG/DL (ref 0–15)
PROT/CREAT UR: 0.13 MG/G{CREAT} (ref 0–0.2)
RBC #/AREA URNS AUTO: 2 /HPF (ref 0–4)
SP GR UR STRIP: 1.02 (ref 1–1.03)
URN SPEC COLLECT METH UR: ABNORMAL
UROBILINOGEN UR STRIP-ACNC: NEGATIVE EU/DL
WBC #/AREA URNS AUTO: 20 /HPF (ref 0–5)
WBC CLUMPS UR QL AUTO: ABNORMAL
YEAST UR QL AUTO: ABNORMAL

## 2024-03-18 PROCEDURE — 84156 ASSAY OF PROTEIN URINE: CPT | Performed by: INTERNAL MEDICINE

## 2024-03-18 PROCEDURE — 81000 URINALYSIS NONAUTO W/SCOPE: CPT | Mod: PN | Performed by: INTERNAL MEDICINE

## 2024-03-18 PROCEDURE — 82043 UR ALBUMIN QUANTITATIVE: CPT | Mod: PN | Performed by: INTERNAL MEDICINE

## 2024-06-17 ENCOUNTER — LAB VISIT (OUTPATIENT)
Dept: LAB | Facility: HOSPITAL | Age: 69
End: 2024-06-17
Attending: INTERNAL MEDICINE
Payer: MEDICARE

## 2024-06-17 DIAGNOSIS — N18.32 STAGE 3B CHRONIC KIDNEY DISEASE: ICD-10-CM

## 2024-06-17 LAB
ALBUMIN/CREAT UR: 20.7 UG/MG (ref 0–30)
BACTERIA #/AREA URNS AUTO: NORMAL /HPF
BILIRUB UR QL STRIP: NEGATIVE
CLARITY UR REFRACT.AUTO: CLEAR
COLOR UR AUTO: YELLOW
CREAT UR-MCNC: 43.5 MG/DL (ref 15–325)
CREAT UR-MCNC: 43.5 MG/DL (ref 15–325)
GLUCOSE UR QL STRIP: ABNORMAL
HGB UR QL STRIP: NEGATIVE
KETONES UR QL STRIP: NEGATIVE
LEUKOCYTE ESTERASE UR QL STRIP: ABNORMAL
MICROALBUMIN UR DL<=1MG/L-MCNC: 9 UG/ML
MICROSCOPIC COMMENT: NORMAL
NITRITE UR QL STRIP: NEGATIVE
PH UR STRIP: 7 [PH] (ref 5–8)
PROT UR QL STRIP: NEGATIVE
PROT UR-MCNC: <7 MG/DL (ref 0–15)
PROT/CREAT UR: NORMAL MG/G{CREAT} (ref 0–0.2)
SP GR UR STRIP: 1.01 (ref 1–1.03)
URN SPEC COLLECT METH UR: ABNORMAL
UROBILINOGEN UR STRIP-ACNC: NEGATIVE EU/DL
WBC #/AREA URNS AUTO: 5 /HPF (ref 0–5)

## 2024-06-17 PROCEDURE — 81000 URINALYSIS NONAUTO W/SCOPE: CPT | Mod: PN | Performed by: INTERNAL MEDICINE

## 2024-06-17 PROCEDURE — 82043 UR ALBUMIN QUANTITATIVE: CPT | Mod: PN | Performed by: INTERNAL MEDICINE

## 2024-06-17 PROCEDURE — 82570 ASSAY OF URINE CREATININE: CPT | Performed by: INTERNAL MEDICINE

## 2024-06-19 ENCOUNTER — LAB VISIT (OUTPATIENT)
Dept: LAB | Facility: HOSPITAL | Age: 69
End: 2024-06-19
Attending: INTERNAL MEDICINE
Payer: MEDICARE

## 2024-06-19 DIAGNOSIS — N18.32 STAGE 3B CHRONIC KIDNEY DISEASE: ICD-10-CM

## 2024-06-19 LAB
ALBUMIN SERPL BCP-MCNC: 4.1 G/DL (ref 3.5–5.2)
ANION GAP SERPL CALC-SCNC: 9 MMOL/L (ref 8–16)
CALCIUM SERPL-MCNC: 9.3 MG/DL (ref 8.7–10.5)
CHLORIDE SERPL-SCNC: 109 MMOL/L (ref 95–110)
CO2 SERPL-SCNC: 27 MMOL/L (ref 23–29)
CREAT SERPL-MCNC: 2.02 MG/DL (ref 0.5–1.4)
EST. GFR  (NO RACE VARIABLE): 26.7 ML/MIN/1.73 M^2
GLUCOSE SERPL-MCNC: 102 MG/DL (ref 70–110)
PHOSPHATE SERPL-MCNC: 4.5 MG/DL (ref 2.7–4.5)
POTASSIUM SERPL-SCNC: 5.3 MMOL/L (ref 3.5–5.1)
SODIUM SERPL-SCNC: 145 MMOL/L (ref 136–145)
UUN UR-MCNC: 27 MG/DL (ref 7–17)

## 2024-06-19 PROCEDURE — 36415 COLL VENOUS BLD VENIPUNCTURE: CPT | Mod: PN | Performed by: INTERNAL MEDICINE

## 2024-06-19 PROCEDURE — 80069 RENAL FUNCTION PANEL: CPT | Mod: PN | Performed by: INTERNAL MEDICINE

## 2024-07-18 ENCOUNTER — HOSPITAL ENCOUNTER (EMERGENCY)
Facility: HOSPITAL | Age: 69
Discharge: HOME | End: 2024-07-18
Attending: STUDENT IN AN ORGANIZED HEALTH CARE EDUCATION/TRAINING PROGRAM
Payer: MEDICARE

## 2024-07-18 VITALS
HEART RATE: 94 BPM | BODY MASS INDEX: 39.7 KG/M2 | OXYGEN SATURATION: 94 % | HEIGHT: 66 IN | WEIGHT: 247 LBS | RESPIRATION RATE: 18 BRPM | DIASTOLIC BLOOD PRESSURE: 72 MMHG | SYSTOLIC BLOOD PRESSURE: 155 MMHG | TEMPERATURE: 101 F

## 2024-07-18 DIAGNOSIS — J10.1 INFLUENZA A: Primary | ICD-10-CM

## 2024-07-18 LAB
INFLUENZA A, MOLECULAR: POSITIVE
INFLUENZA B, MOLECULAR: NEGATIVE
SARS-COV-2 RDRP RESP QL NAA+PROBE: NEGATIVE
SPECIMEN SOURCE: ABNORMAL

## 2024-07-18 PROCEDURE — 99283 EMERGENCY DEPT VISIT LOW MDM: CPT | Mod: ER

## 2024-07-18 PROCEDURE — 87502 INFLUENZA DNA AMP PROBE: CPT | Mod: ER | Performed by: STUDENT IN AN ORGANIZED HEALTH CARE EDUCATION/TRAINING PROGRAM

## 2024-07-18 PROCEDURE — U0002 COVID-19 LAB TEST NON-CDC: HCPCS | Mod: ER | Performed by: STUDENT IN AN ORGANIZED HEALTH CARE EDUCATION/TRAINING PROGRAM

## 2024-07-18 PROCEDURE — 25000003 PHARM REV CODE 250: Mod: ER | Performed by: STUDENT IN AN ORGANIZED HEALTH CARE EDUCATION/TRAINING PROGRAM

## 2024-07-18 RX ORDER — CETIRIZINE HYDROCHLORIDE 10 MG/1
10 TABLET ORAL
Status: COMPLETED | OUTPATIENT
Start: 2024-07-18 | End: 2024-07-18

## 2024-07-18 RX ORDER — ACETAMINOPHEN 500 MG
1000 TABLET ORAL
Status: COMPLETED | OUTPATIENT
Start: 2024-07-18 | End: 2024-07-18

## 2024-07-18 RX ADMIN — ACETAMINOPHEN 1000 MG: 500 TABLET ORAL at 09:07

## 2024-07-18 RX ADMIN — CETIRIZINE HYDROCHLORIDE 10 MG: 10 TABLET, FILM COATED ORAL at 10:07

## 2024-07-18 NOTE — ED PROVIDER NOTES
NAME:  Martha Sevilla  CSN:     [id number]  MRN:    [id number]  ADMIT DATE: 7/18/2024        eMERGENCY dEPARTMENT eNCOUnter    CHIEF COMPLAINT    Chief Complaint   Patient presents with    Headache     Headache, congested, sneezing and coughing that started last night       HPI    Martha Sevilla is a 68 y.o. 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     she presents to the ED due to cough, nasal congestion and headache since last night.  Took a leave around 2:00 a.m. with some improvement but headache remains.  No known sick contacts other than a friend of hers he was diagnosed with pneumonia a few weeks ago.  No nausea, vomiting or abdominal pain.  No chest pain.      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     Gout, unspecified     Hematuria     HTN (hypertension) 05/20/2021    Hyperglycemia due to diabetes mellitus     Hyperkalemia     Hyperphosphatemia     Hypocalcemia     IgA nephropathy     Osteopenia     Proteinuria     Secondary hyperparathyroidism     Steroid-induced osteopenia     Vitamin D deficiency        SURGICAL HISTORY    Past Surgical History:   Procedure Laterality Date    Evacuation of hematoma of leg Right     KNEE ARTHROPLASTY Left 3/18/2019    Procedure:  ARTHROPLASTY, KNEE left;  Surgeon: Niraj Pérez MD;  [facility];  Service: Orthopedics;  Laterality: Left;    TUBAL LIGATION         FAMILY HISTORY    No family history on file.    SOCIAL HISTORY    Social History     Socioeconomic History    Marital status:    Tobacco Use    Smoking status: Never    Smokeless tobacco: Never   Substance and Sexual Activity    Alcohol use: No    Drug use: No       MEDICATIONS  Current Outpatient Medications   Medication Instructions    allopurinoL (ZYLOPRIM) 100 mg, Oral, Daily    amLODIPine (NORVASC) 10 mg, Oral, Daily    aspirin (ECOTRIN) 81 mg, Oral, Daily    atorvastatin (LIPITOR) 40 mg, Oral, Daily    denosumab (PROLIA) 60 mg, Subcutaneous, Every 6 months    empagliflozin (JARDIANCE) 10 mg, Oral, Daily    ergocalciferol (ERGOCALCIFEROL) 50,000 Units, Oral, Every 7 days    furosemide (LASIX) 40-80 mg, Oral, Daily    lisinopriL (PRINIVIL,ZESTRIL) 40 mg, Oral, As needed (PRN)    magnesium oxide (MAG-OX) 400 mg, Oral, 2 times daily    metFORMIN (GLUCOPHAGE) 1,000 mg, Oral, 2 times daily with meals    metoprolol succinate (TOPROL-XL) 100 mg, Oral, Daily    MOUNJARO 2.5 mg, Subcutaneous, Every 7 days    omega 3-dha-epa-fish oil (FISH OIL) 1,200 (144-216) mg Cap 1 capsule, Oral, Daily    omega-3 fatty acids/fish oil (FISH OIL-OMEGA-3 FATTY ACIDS) 300-1,000 mg capsule Oral, Daily    sodium zirconium cyclosilicate (LOKELMA) 10 g, Oral, Every Mon, Wed, Fri, Mix entire contents of packet(s) into drinking glass containing 3 tablespoons of water; stir well and drink immediately. Add water and repeat until no powder remains to receive entire dose.       ALLERGIES    Review of patient's allergies indicates:   Allergen Reactions    Farxiga [dapagliflozin] Diarrhea, Nausea And Vomiting and Rash         REVIEW OF SYSTEMS   Review of Systems       PHYSICAL EXAM    Reviewed Triage Note    VITAL SIGNS:   ED Triage Vitals [07/18/24 0943]   Enc Vitals Group      BP (!) 147/65       "Pulse 107      Resp 20      Temp (!) 101 °F (38.3 °C)      Temp Source Oral      SpO2 (!) 94 %      Weight 247 lb      Height 5' 6"      Head Circumference       Peak Flow       Pain Score       Pain Loc       Pain Education       Exclude from Growth Chart        Patient Vitals for the past 24 hrs:   BP Temp Temp src Pulse Resp SpO2 Height Weight   07/18/24 1048 (!) 155/72 (!) 100.5 °F (38.1 °C) -- 94 18 (!) 94 % -- --   07/18/24 0943 (!) 147/65 (!) 101 °F (38.3 °C) Oral 107 20 (!) 94 % 5' 6" (1.676 m) 112 kg (247 lb)       Physical Exam    Nursing note and vitals reviewed.  Constitutional: She appears well-developed and well-nourished. She is diaphoretic.   HENT:   Head: Normocephalic and atraumatic.   Nasal congestion noted.   Eyes: EOM are normal. Pupils are equal, round, and reactive to light.   Neck: Neck supple.   Normal range of motion.  Cardiovascular:  Normal rate and regular rhythm.           Pulmonary/Chest: Breath sounds normal. No respiratory distress. She has no wheezes. She has no rales.   Abdominal: Abdomen is soft. There is no abdominal tenderness.   Musculoskeletal:         General: Normal range of motion.      Cervical back: Normal range of motion and neck supple.     Neurological: She is alert and oriented to person, place, and time.   Skin: Skin is warm.   Psychiatric: She has a normal mood and affect.                EKG     Interpreted by EM physician if performed:               LABS  Pertinent labs reviewed. (See chart for details)   Labs Reviewed   INFLUENZA A & B BY MOLECULAR - Abnormal; Notable for the following components:       Result Value    Influenza A, Molecular Positive (*)     All other components within normal limits   SARS-COV-2 RNA AMPLIFICATION, QUAL         RADIOLOGY          Imaging Results    None           PROCEDURES    Procedures      ED COURSE & MEDICAL DECISION MAKING    Pertinent Labs & Imaging studies reviewed. (See chart for details and specific orders.)      "     Summary of review of records:   Patient follows closely with nephrology for stage IIIB CKD.  Last visit in June of 2024.  She does have a history of IgA nephropathy treated with steroid and develop steroid induced diabetes.  Medical Decision Making  Amount and/or Complexity of Data Reviewed  Labs:  Decision-making details documented in ED Course.    Risk  OTC drugs.       This is a 68 y.o. female who I believe has a viral upper respiratory infection.      Based upon the H&P, workup the patient does not appear to have a serious bacterial infection.  I doubt pneumonia, sepsis, AOM, bacterial sinusitis, meningitis.   I believe that no further workup/treatment is needed at this time and that the patient is stable for discharge.  Plan for supportive care.    Clinical impression and plan discussed with patient.   Pt to call for follow up with PCP or referred physician in 7 days.   Pt is to return to the ED immediately for any new or worsening symptoms, or for any other concerns.    Pt had time for ask questions to which they were addressed. Pt expressed understanding.           Medications   acetaminophen tablet 1,000 mg (1,000 mg Oral Given 7/18/24 0957)   cetirizine tablet 10 mg (10 mg Oral Given 7/18/24 1045)       ED Course as of 07/19/24 0640   Thu Jul 18, 2024   1027 SARS-CoV-2 RNA, Amplification, Qual: Negative [HL]   1036 Influenza A, Molecular(!): Positive [HL]   1036 Influenza B, Molecular: Negative [HL]      ED Course User Index  [HL] Deepa Moy DO             FINAL IMPRESSION    Final diagnoses:  [J10.1] Influenza A (Primary)       DISPOSITION  Patient discharged in stable condition        ED Prescriptions    None       Follow-up Information       Follow up With Specialties Details Why Contact Info    Zunilda Dickson MD Internal Medicine Schedule an appointment as soon as possible for a visit in 1 week  [address]  [address]  [facility] [address] [phone]      [facility]  [place] - Emergency Dept Emergency Medicine  As needed, If symptoms worsen [address] [facility] [address] [phone]              DISCLAIMER: This note was prepared with [employer] voice recognition transcription software. Garbled syntax, mangled pronouns, and other bizarre constructions may be attributed to that software system.             Deepa Moy,   07/19/24 0641

## 2024-07-18 NOTE — DISCHARGE INSTRUCTIONS
Please take an over the counter antihistamine medication (Allegra/Claritin/Zyrtec) of your choice as directed for nasal congestion.     Try an over the counter decongestant like Mucinex D or Sudafed. You can buy this behind the pharmacy counter     If you do have Hypertension or palpitations, it is safe to take Coricidin HBP for relief of sinus symptoms.     If not allergic, please take over the counter Tylenol (Acetaminophen) and/or Motrin (Ibuprofen) as directed for control of pain and/or fever. You can stagger the dosing so you are taking one or the other every three hours while spacing out the Tylenol every 6 hours and the Motrin every 6 hours.  Please follow up with your primary care doctor or specialist as needed.     Sore throat recommendations: Warm fluids, warm salt water gargles, throat lozenges, tea, honey, soup, rest, hydration.     Use over the counter flonase: one spray each nostril twice daily OR two sprays each nostril once daily.      If you  smoke, please stop smoking.     When people get sick with a respiratory virus, the updated guidance recommends that they stay home and away from others. The recommendations suggest returning to normal activities when, for at least 24 hours, symptoms are improving overall, and if a fever was present, it has been gone without use of a fever-reducing medication.  Once people resume normal activities, they are encouraged to take additional prevention strategies for the next 5 days to curb disease spread, such as taking more steps for  air, enhancing hygiene practices, wearing a well-fitting mask, and keeping a distance from others. Enhanced precautions are especially important to protect those most at risk for severe illness, including those over 65 and people with weakened immune systems.      Please return or see your primary care doctor if you develop new or worsening symptoms.

## 2024-09-25 NOTE — PROGRESS NOTES
Caller: Brian Bourgeois  IS NOT ON  VERBAL    Relationship: Emergency Contact    Best call back number: [phone]     What is the best time to reach you: AS SOON AS POSSIBLE    Who are you requesting to speak with (clinical staff, provider,  specific staff member): CLINICAL    What was the call regarding: PHARMACY TOLD BRIAN THAT THEY REACHED OUT TO OFFICE YESTERDAY REGARDING COVID MEDICATION AND NEVER HEARD BACK. BRIAN WOULD LIKE A CALL BACK ONCE EVERYTHING HAS BEEN TAKEN CARE OF.    [facility] - [address] [address] [place] [phone] [facility] [phone] FX     Is it okay if the provider responds through [employer]: NO       Physical Therapy Daily Treatment Note     Name: Martha Sevilla  Clinic Number: [id number]    Therapy Diagnosis:   Encounter Diagnoses   Name Primary?    Chronic pain of left knee     Decreased range of motion (ROM) of left knee     Decreased strength of lower extremity     Impaired gait and mobility      Physician: Niraj Pérez MD    Visit Date: 4/5/2019    Physician Orders: PT Eval and Treat   Medical Diagnosis from Referral:   M17.12 (ICD-10-CM) - Arthritis of knee, left   Z96.652 (ICD-10-CM) - History of left knee replacement   Evaluation Date: 4/3/2019  Authorization Period Expiration: 3/18/2020  Plan of Care Expiration: 5/31/2019  Visit # / Visits authorized: 2/50  FOTO: 2/10     Time In: 0900  Time Out: 1000  Total Billable Time: 40 minutes (3 TE)  Precautions: Standard, Fall    Subjective   Mrs. Sevilla presents to PT today for her first f/u appt.  Ambulating with RW with heavy reliance on this assistive device.  Primary complaint is L calf pain.   She was compliant with home exercise program.  Response to previous treatment: eval only   Functional change: none at this time mentioned.     Pain: 7/10  Location: left knee      Objective   O: L knee PROM: (-) 6 - 104 degrees    Martha received therapeutic exercises to develop strength, endurance, ROM, flexibility, posture and core stabilization for 30 minutes with PT 1:1 including assessment emerson 10 minutes under supervision:  - quad sets    20 reps with towel roll (cueing)  - short arc quads   3x10   - long arc quads   3x10 with 3# ankle weight.  - SLR     A/AAROM 3x10 LLE  - sideying hip abduction  3x10 LLE  - fitter stretch    3x30 seconds  - HSS at stairs    3x30 seconds LLE  - heel slides with strap  10 reps x 10 second holds    Martha received the following manual therapy techniques to L knee complex for a total of 10 minutes:  - grade III/IV patellar mobilizations  - grade III/IV L knee passive physiological flexion and extension  -  STM/MFR to proximal gastrocnemius/ distal HS complexes    Martha received cold pack for 10 minutes to L knee while on a knee extension creep stretch.    Home Exercises Provided and Patient Education Provided   Education provided:   - continue HEP     Written Home Exercises Provided: Patient instructed to cont prior HEP.  Exercises were reviewed and Martha was able to demonstrate them prior to the end of the session.  Martha demonstrated good  understanding of the education provided.     See EMR under Patient Instructions for exercises provided 04/03/2019.    Assessment   A:  S/p L TKA.  Post-op week 2-3.     Martha is progressing well towards her goals.   Pt prognosis is Good.     Pt will continue to benefit from skilled outpatient physical therapy to address the deficits listed in the problem list box on initial evaluation, provide pt/family education and to maximize pt's level of independence in the home and community environment.     Pt's spiritual, cultural and educational needs considered and pt agreeable to plan of care and goals.     Anticipated barriers to physical therapy: co-morbidities    Goals:   Short Term Goals:  1. Pt will be independent with HEP supplement PT in improving functional mobility.  2. Pt will improve L LE strength to at least 75% of R LE strength as measured via MicroFet handheld dynamometer in order to improve functional mobility  3. Pt will improve L knee AROM to at least 5-100 in order to improve gait  Long Term Goals:  1. Pt will improve L LE strength to at least 90% of R LE strength as measured via MicroFet handheld dynamometer in order to improve functional mobility  2. Pt will improve L knee AROM to at least 3-120 in order to improve gait and ability to perform ADLs  3. Pt will improve FOTO knee survey score to </= 44% limited in order to demo improved functional mobility  4. Pt will perform TUG in < 10 seconds without AD in order to demo improved gait speed  5. Pt will  perform at least 10 sit to stands without UE support on 30 second sit to stand test in order to demo improved ability to perform transfers      Plan   Phase I/II of post-op L TKA rehab program.     Miguel Angel Arango, PT

## 2024-11-05 ENCOUNTER — LAB VISIT (OUTPATIENT)
Dept: LAB | Facility: HOSPITAL | Age: 69
End: 2024-11-05
Attending: INTERNAL MEDICINE
Payer: MEDICARE

## 2024-11-05 DIAGNOSIS — N18.32 STAGE 3B CHRONIC KIDNEY DISEASE: ICD-10-CM

## 2024-11-05 LAB
ALBUMIN/CREAT UR: 79.5 UG/MG (ref 0–30)
BACTERIA #/AREA URNS AUTO: ABNORMAL /HPF
BILIRUB UR QL STRIP: NEGATIVE
CLARITY UR REFRACT.AUTO: ABNORMAL
COLOR UR AUTO: YELLOW
CREAT UR-MCNC: 140.8 MG/DL (ref 15–325)
CREAT UR-MCNC: 140.8 MG/DL (ref 15–325)
GLUCOSE UR QL STRIP: ABNORMAL
HGB UR QL STRIP: ABNORMAL
HYALINE CASTS UR QL AUTO: 0 /LPF
KETONES UR QL STRIP: NEGATIVE
LEUKOCYTE ESTERASE UR QL STRIP: NEGATIVE
MICROALBUMIN UR DL<=1MG/L-MCNC: 112 UG/ML
MICROSCOPIC COMMENT: ABNORMAL
NITRITE UR QL STRIP: NEGATIVE
PH UR STRIP: 6 [PH] (ref 5–8)
PROT UR QL STRIP: NEGATIVE
PROT UR-MCNC: 28 MG/DL (ref 0–15)
PROT/CREAT UR: 0.2 MG/G{CREAT} (ref 0–0.2)
RBC #/AREA URNS AUTO: 25 /HPF (ref 0–4)
SP GR UR STRIP: 1.01 (ref 1–1.03)
SQUAMOUS #/AREA URNS AUTO: 0 /HPF
URN SPEC COLLECT METH UR: ABNORMAL
UROBILINOGEN UR STRIP-ACNC: NEGATIVE EU/DL
WBC #/AREA URNS AUTO: 40 /HPF (ref 0–5)
WBC CLUMPS UR QL AUTO: ABNORMAL
YEAST UR QL AUTO: ABNORMAL

## 2024-11-05 PROCEDURE — 84156 ASSAY OF PROTEIN URINE: CPT | Performed by: INTERNAL MEDICINE

## 2024-11-05 PROCEDURE — 82043 UR ALBUMIN QUANTITATIVE: CPT | Mod: PN | Performed by: INTERNAL MEDICINE

## 2024-11-05 PROCEDURE — 81000 URINALYSIS NONAUTO W/SCOPE: CPT | Mod: PN | Performed by: INTERNAL MEDICINE

## 2024-12-16 ENCOUNTER — LAB VISIT (OUTPATIENT)
Dept: LAB | Facility: HOSPITAL | Age: 69
End: 2024-12-16
Attending: INTERNAL MEDICINE
Payer: MEDICARE

## 2024-12-16 DIAGNOSIS — N18.32 STAGE 3B CHRONIC KIDNEY DISEASE: ICD-10-CM

## 2024-12-16 LAB
ALBUMIN/CREAT UR: 17.7 UG/MG (ref 0–30)
BILIRUB UR QL STRIP: NEGATIVE
CLARITY UR REFRACT.AUTO: CLEAR
COLOR UR AUTO: YELLOW
CREAT UR-MCNC: 62 MG/DL (ref 15–325)
GLUCOSE UR QL STRIP: ABNORMAL
HGB UR QL STRIP: NEGATIVE
KETONES UR QL STRIP: NEGATIVE
LEUKOCYTE ESTERASE UR QL STRIP: NEGATIVE
MICROALBUMIN UR DL<=1MG/L-MCNC: 11 UG/ML
NITRITE UR QL STRIP: NEGATIVE
PH UR STRIP: 6 [PH] (ref 5–8)
PROT UR QL STRIP: NEGATIVE
SP GR UR STRIP: 1.01 (ref 1–1.03)
URN SPEC COLLECT METH UR: ABNORMAL
UROBILINOGEN UR STRIP-ACNC: 1 EU/DL

## 2024-12-16 PROCEDURE — 82570 ASSAY OF URINE CREATININE: CPT | Mod: PN | Performed by: INTERNAL MEDICINE

## 2024-12-16 PROCEDURE — 81003 URINALYSIS AUTO W/O SCOPE: CPT | Mod: PN | Performed by: INTERNAL MEDICINE

## 2025-02-18 ENCOUNTER — LAB VISIT (OUTPATIENT)
Dept: LAB | Facility: HOSPITAL | Age: 70
End: 2025-02-18
Attending: INTERNAL MEDICINE
Payer: MEDICARE

## 2025-02-18 DIAGNOSIS — N18.32 STAGE 3B CHRONIC KIDNEY DISEASE: ICD-10-CM

## 2025-02-18 LAB
ALBUMIN/CREAT UR: 8.8 UG/MG (ref 0–30)
BACTERIA #/AREA URNS AUTO: ABNORMAL /HPF
BILIRUB UR QL STRIP: NEGATIVE
CLARITY UR REFRACT.AUTO: ABNORMAL
COLOR UR AUTO: YELLOW
CREAT UR-MCNC: 125.3 MG/DL (ref 15–325)
CREAT UR-MCNC: 125.3 MG/DL (ref 15–325)
GLUCOSE UR QL STRIP: ABNORMAL
HGB UR QL STRIP: ABNORMAL
KETONES UR QL STRIP: NEGATIVE
LEUKOCYTE ESTERASE UR QL STRIP: NEGATIVE
MICROALBUMIN UR DL<=1MG/L-MCNC: 11 UG/ML
MICROSCOPIC COMMENT: ABNORMAL
NITRITE UR QL STRIP: POSITIVE
PH UR STRIP: 6 [PH] (ref 5–8)
PROT UR QL STRIP: NEGATIVE
PROT UR-MCNC: 11 MG/DL (ref 0–15)
PROT/CREAT UR: 0.09 MG/G{CREAT} (ref 0–0.2)
RBC #/AREA URNS AUTO: 2 /HPF (ref 0–4)
SP GR UR STRIP: 1.02 (ref 1–1.03)
URN SPEC COLLECT METH UR: ABNORMAL
UROBILINOGEN UR STRIP-ACNC: NEGATIVE EU/DL
WBC #/AREA URNS AUTO: 30 /HPF (ref 0–5)
WBC CLUMPS UR QL AUTO: ABNORMAL

## 2025-02-18 PROCEDURE — 81000 URINALYSIS NONAUTO W/SCOPE: CPT | Mod: PN | Performed by: INTERNAL MEDICINE

## 2025-02-18 PROCEDURE — 84156 ASSAY OF PROTEIN URINE: CPT | Performed by: INTERNAL MEDICINE

## 2025-02-18 PROCEDURE — 82043 UR ALBUMIN QUANTITATIVE: CPT | Mod: PN | Performed by: INTERNAL MEDICINE

## 2025-04-15 ENCOUNTER — HOSPITAL ENCOUNTER (EMERGENCY)
Facility: HOSPITAL | Age: 70
Discharge: HOME | End: 2025-04-15
Attending: EMERGENCY MEDICINE
Payer: MEDICARE

## 2025-04-15 VITALS
DIASTOLIC BLOOD PRESSURE: 78 MMHG | HEART RATE: 83 BPM | BODY MASS INDEX: 35.63 KG/M2 | HEIGHT: 67 IN | TEMPERATURE: 98 F | WEIGHT: 227 LBS | OXYGEN SATURATION: 96 % | SYSTOLIC BLOOD PRESSURE: 125 MMHG | RESPIRATION RATE: 20 BRPM

## 2025-04-15 DIAGNOSIS — R10.9 LEFT FLANK PAIN: ICD-10-CM

## 2025-04-15 DIAGNOSIS — N30.01 ACUTE CYSTITIS WITH HEMATURIA: Primary | ICD-10-CM

## 2025-04-15 LAB
ABSOLUTE EOSINOPHIL (OHS): 0.16 K/UL
ABSOLUTE MONOCYTE (OHS): 0.66 K/UL (ref 0.3–1)
ABSOLUTE NEUTROPHIL COUNT (OHS): 4.88 K/UL (ref 1.8–7.7)
ALBUMIN SERPL BCP-MCNC: 3.8 G/DL (ref 3.5–5.2)
ALP SERPL-CCNC: 106 UNIT/L (ref 38–126)
ALT SERPL W/O P-5'-P-CCNC: 13 UNIT/L (ref 10–44)
ANION GAP (OHS): 8 MMOL/L (ref 8–16)
AST SERPL-CCNC: 18 UNIT/L (ref 15–46)
BACTERIA #/AREA URNS HPF: ABNORMAL /HPF
BASOPHILS # BLD AUTO: 0.04 K/UL
BASOPHILS NFR BLD AUTO: 0.5 %
BILIRUB SERPL-MCNC: 0.7 MG/DL (ref 0.1–1)
BILIRUB UR QL STRIP.AUTO: NEGATIVE
BUN SERPL-MCNC: 15 MG/DL (ref 7–17)
CALCIUM SERPL-MCNC: 8.4 MG/DL (ref 8.7–10.5)
CHLORIDE SERPL-SCNC: 104 MMOL/L (ref 95–110)
CLARITY UR: ABNORMAL
CO2 SERPL-SCNC: 25 MMOL/L (ref 23–29)
COLOR UR AUTO: YELLOW
CREAT SERPL-MCNC: 1.2 MG/DL (ref 0.5–1.4)
ERYTHROCYTE [DISTWIDTH] IN BLOOD BY AUTOMATED COUNT: 12.8 % (ref 11.5–14.5)
GFR SERPLBLD CREATININE-BSD FMLA CKD-EPI: 50 ML/MIN/1.73/M2
GLUCOSE SERPL-MCNC: 103 MG/DL (ref 70–110)
GLUCOSE UR QL STRIP: ABNORMAL
HCT VFR BLD AUTO: 37.5 % (ref 37–48.5)
HGB BLD-MCNC: 12 GM/DL (ref 12–16)
HGB UR QL STRIP: ABNORMAL
HOLD SPECIMEN: NORMAL
IMM GRANULOCYTES # BLD AUTO: 0.03 K/UL (ref 0–0.04)
IMM GRANULOCYTES NFR BLD AUTO: 0.4 % (ref 0–0.5)
KETONES UR QL STRIP: NEGATIVE
LEUKOCYTE ESTERASE UR QL STRIP: ABNORMAL
LYMPHOCYTES # BLD AUTO: 1.62 K/UL (ref 1–4.8)
MCH RBC QN AUTO: 29.3 PG (ref 27–31)
MCHC RBC AUTO-ENTMCNC: 32 G/DL (ref 32–36)
MCV RBC AUTO: 92 FL (ref 82–98)
MICROSCOPIC COMMENT: ABNORMAL
NITRITE UR QL STRIP: NEGATIVE
NUCLEATED RBC (/100WBC) (OHS): 0 /100 WBC
PH UR STRIP: 6 [PH]
PLATELET # BLD AUTO: 243 K/UL (ref 150–450)
PMV BLD AUTO: 9.6 FL (ref 9.2–12.9)
POTASSIUM SERPL-SCNC: 4.7 MMOL/L (ref 3.5–5.1)
PROT SERPL-MCNC: 7.8 GM/DL (ref 6–8.4)
PROT UR QL STRIP: NEGATIVE
RBC # BLD AUTO: 4.09 M/UL (ref 4–5.4)
RBC #/AREA URNS HPF: 5 /HPF (ref 0–4)
RELATIVE EOSINOPHIL (OHS): 2.2 %
RELATIVE LYMPHOCYTE (OHS): 21.9 % (ref 18–48)
RELATIVE MONOCYTE (OHS): 8.9 % (ref 4–15)
RELATIVE NEUTROPHIL (OHS): 66.1 % (ref 38–73)
SODIUM SERPL-SCNC: 137 MMOL/L (ref 136–145)
SP GR UR STRIP: 1.02
UROBILINOGEN UR STRIP-ACNC: 1 EU/DL
WBC # BLD AUTO: 7.39 K/UL (ref 3.9–12.7)
WBC #/AREA URNS HPF: 40 /HPF (ref 0–5)
WBC CLUMPS URNS QL MICRO: ABNORMAL
YEAST URNS QL MICRO: ABNORMAL /HPF

## 2025-04-15 PROCEDURE — 81003 URINALYSIS AUTO W/O SCOPE: CPT | Mod: ER

## 2025-04-15 PROCEDURE — 25000003 PHARM REV CODE 250: Mod: ER

## 2025-04-15 PROCEDURE — 87186 SC STD MICRODIL/AGAR DIL: CPT | Mod: ER

## 2025-04-15 PROCEDURE — 85025 COMPLETE CBC W/AUTO DIFF WBC: CPT | Mod: ER

## 2025-04-15 PROCEDURE — 99284 EMERGENCY DEPT VISIT MOD MDM: CPT | Mod: 25,ER

## 2025-04-15 PROCEDURE — 84132 ASSAY OF SERUM POTASSIUM: CPT | Mod: ER

## 2025-04-15 RX ORDER — CEPHALEXIN 500 MG/1
500 CAPSULE ORAL 2 TIMES DAILY
Qty: 14 CAPSULE | Refills: 0 | Status: SHIPPED | OUTPATIENT
Start: 2025-04-15 | End: 2025-04-22

## 2025-04-15 RX ORDER — ACETAMINOPHEN 325 MG/1
650 TABLET ORAL
Status: COMPLETED | OUTPATIENT
Start: 2025-04-15 | End: 2025-04-15

## 2025-04-15 RX ADMIN — ACETAMINOPHEN 650 MG: 325 TABLET ORAL at 01:04

## 2025-04-15 NOTE — ED PROVIDER NOTES
Encounter Date: 4/15/2025       History     Chief Complaint   Patient presents with    Flank Pain     Left flank pain that started yesterday. Denies NVD. Denies urinary symptoms     69-year-old female with history of anemia, CKD, diabetes, glomerulonephritis, gout, hematuria, hypertension, hyperphosphatemia, hypocalcemia, IgA nephropathy, osteopenia, proteinuria, secondary hyperparathyroidism, osteopenia, vitamin-D deficiency  Who presents today for evaluation of left-sided flank pain that began last night.  Patient was sitting down watching TV when she felt onset of pain.  Pain does not radiate anywhere.  She does not have pain when lying still, she experiences pain with movement.  No trauma or injury.  She denies fever, chills, nausea, vomiting, dysuria, hematuria.  No medications taken for her discomfort.  She denies history of kidney stones.  She reports mainly drinking soft drinks and teas, not much water.    The history is provided by the patient and medical records. No  was used.     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     Gout, unspecified     Hematuria     HTN (hypertension) 05/20/2021    Hyperglycemia due to diabetes mellitus     Hyperkalemia     Hyperphosphatemia     Hypocalcemia     IgA nephropathy     Osteopenia     Proteinuria     Secondary hyperparathyroidism     Steroid-induced osteopenia     Vitamin D deficiency      Past Surgical History:   Procedure Laterality Date    Evacuation of hematoma of leg Right     KNEE ARTHROPLASTY Left 3/18/2019    Procedure: ARTHROPLASTY, KNEE left;  Surgeon: Niraj Pérez MD;  Location: [facility] OR;   Service: Orthopedics;  Laterality: Left;    TUBAL LIGATION       No family history on file.  Social History[1]  Review of Systems   Constitutional:  Negative for fever.   Respiratory:  Negative for shortness of breath.    Cardiovascular:  Negative for chest pain.   Gastrointestinal:  Negative for abdominal pain, nausea and vomiting.   Genitourinary:  Positive for flank pain. Negative for difficulty urinating, dysuria, frequency, hematuria and urgency.   Musculoskeletal:  Negative for back pain.       Physical Exam     Initial Vitals [04/15/25 1021]   BP Pulse Resp Temp SpO2   125/78 83 20 98.3 °F (36.8 °C) 96 %      MAP       --         Physical Exam    Nursing note and vitals reviewed.  Constitutional: She appears well-developed and well-nourished. She is not diaphoretic.  Non-toxic appearance. No distress.   Neck: Neck supple.   Cardiovascular:  Normal rate and intact distal pulses.           Pulmonary/Chest: Breath sounds normal. No respiratory distress. She has no wheezes.   Abdominal: Abdomen is soft. She exhibits no distension. There is no abdominal tenderness.   Musculoskeletal:      Cervical back: Normal and neck supple.      Thoracic back: Normal.      Lumbar back: Normal.     Neurological: She is alert and oriented to person, place, and time. GCS score is 15. GCS eye subscore is 4. GCS verbal subscore is 5. GCS motor subscore is 6.   Skin: Skin is warm and dry. Capillary refill takes less than 2 seconds.   Psychiatric: She has a normal mood and affect. Her behavior is normal.         ED Course   Procedures  Labs Reviewed   URINALYSIS, REFLEX TO URINE CULTURE - Abnormal       Result Value    Color, UA Yellow      Appearance, UA Cloudy (*)     pH, UA 6.0      Spec Grav UA 1.020      Protein, UA Negative      Glucose, UA 2+ (*)     Ketones, UA Negative      Bilirubin, UA Negative      Blood, UA Trace (*)     Nitrites, UA Negative      Urobilinogen, UA 1.0      Leukocyte Esterase, UA 1+ (*)    COMPREHENSIVE  METABOLIC PANEL - Abnormal    Sodium 137      Potassium 4.7      Chloride 104      CO2 25      Glucose 103      BUN 15      Creatinine 1.2      Calcium 8.4 (*)     Protein Total 7.8      Albumin 3.8      Bilirubin Total 0.7            AST 18      ALT 13      Anion Gap 8      eGFR 50 (*)    URINALYSIS MICROSCOPIC - Abnormal    RBC, UA 5 (*)     WBC, UA 40 (*)     WBC Clumps, UA Many (*)     Bacteria, UA Many (*)     Yeast, UA Rare (*)     Microscopic Comment       CBC WITH DIFFERENTIAL - Normal    WBC 7.39      RBC 4.09      HGB 12.0      HCT 37.5      MCV 92      MCH 29.3      MCHC 32.0      RDW 12.8      Platelet Count 243      MPV 9.6      Nucleated RBC 0      Neut % 66.1      Lymph % 21.9      Mono % 8.9      Eos % 2.2      Basophil % 0.5      Imm Grans % 0.4      Neut # 4.88      Lymph # 1.62      Mono # 0.66      Eos # 0.16      Baso # 0.04      Imm Grans # 0.03     CULTURE, URINE   GREY TOP URINE HOLD    Extra Tube Hold for add-ons.     CBC W/ AUTO DIFFERENTIAL    Narrative:     The following orders were created for panel order CBC auto differential.  Procedure                               Abnormality         Status                     ---------                               -----------         ------                     CBC with Differential[[id number]]       Normal              Final result                 Please view results for these tests on the individual orders.          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  No definite liver masses.  No ascites.    No acute bowel abnormalities.    No evidence of obstructive uropathy.    All CT scans at this facility use dose modulation, iterative reconstructions, and/or weight base dosing when appropriate to reduce radiation dose to as  low as reasonably achievable      Electronically signed by: Hayder Davis MD  Date:    04/15/2025  Time:    12:34               Narrative:    EXAMINATION:  CT RENAL STONE STUDY ABD PELVIS WO    CLINICAL HISTORY:  Flank pain, kidney stone suspected;    TECHNIQUE:  Noncontrast images were obtained    COMPARISON:  CT abdomen pelvis 11/19/2022.    FINDINGS:  Lung bases are clear    Suspected cirrhosis of the liver.  No definite liver masses.  Mild splenomegaly.  No ascites.  Stable lymph node prominence in the portal caval region and randee hepatic cysts.    Multiple gallstones.  No gallbladder wall thickening or bile duct dilatation.    The kidneys are normal.  No stones.  No hydronephrosis.    The aorta and inferior vena cava are unremarkable.    There are no acute bowel abnormalities.     No evidence of appendicitis.  No evidence of diverticulitis.    Bladder is normal. No abnormal masses or fluid collections in the pelvis.    Skeletal structures are intact.  No acute skeletal findings.                                       Medications   acetaminophen tablet 650 mg (650 mg Oral Given 4/15/25 1322)     Medical Decision Making  69-year-old female presenting today for evaluation of left flank pain since yesterday, no medications taken for her discomfort.  On exam she is nontoxic appearing and in no acute distress.  Vitals are stable.  Abdomen is soft and nontender, nondistended.  Flank pain is not reproducible on exam.    Differential includes but is not limited to: Appendicitis, cholecystitis, cholangitis, pancreatitis, hepatitis, abdominal aortic aneurysm, diabetic ketoacidosis, bowel obstruction, intra-abdominal perforation, intra-abdominal infection vs. abscess, nephrolithiasis, pyelonephritis, urinary tract infection, electrolyte and/or metabolic abnormality, testicular/ovarian torsion, shingles, mesenteric ischemia, diverticulitis      No stone noted on CT renal.  Kidney function stable.  No leukocytosis. UA  concerning for UTI.  She will be discharged home with Keflex and instructed to follow up with her PCP.  She may otherwise return to the ED for new or worsening.    Amount and/or Complexity of Data Reviewed  Labs: ordered. Decision-making details documented in ED Course.  Radiology: ordered. Decision-making details documented in ED Course.    Risk  OTC drugs.  Prescription drug management.               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  No definite liver masses.  No ascites.     No acute bowel abnormalities.     No evidence of obstructive uropathy.   [EP]      ED Course User Index  [EP] Joanna Tomas PA-C                           Clinical Impression:  Final diagnoses:  [R10.9] Left flank pain  [N30.01] Acute cystitis with hematuria (Primary)          ED Disposition Condition    Discharge Stable          ED Prescriptions       Medication Sig Dispense Start Date End Date Auth. Provider    cephALEXin (KEFLEX) 500 MG capsule Take 1 capsule (500 mg total) by mouth 2 (two) times a day. for 7 days 14 capsule 4/15/2025 4/22/2025 Joanna Tomas PA-C          Follow-up Information       Follow up With Specialties Details Why Contact Info    Zunilda Dickson MD Internal Medicine Schedule an appointment as soon as possible for a visit   [address] [place] [address]  [address]  [facility] [address] [phone]      [facility] - Emergency Dept Emergency Medicine  If symptoms worsen [address] [facility]  Emergency Department  [facility] [id number] [phone]               [1]   Social  History  Tobacco Use    Smoking status: Never    Smokeless tobacco: Never   Substance Use Topics    Alcohol use: No    Drug use: No        Joanna Tomas PA-C  04/15/25 [id number]

## 2025-04-16 ENCOUNTER — RESULTS FOLLOW-UP (OUTPATIENT)
Dept: EMERGENCY MEDICINE | Facility: HOSPITAL | Age: 70
End: 2025-04-16

## 2025-04-17 LAB — BACTERIA UR CULT: ABNORMAL

## 2025-05-13 ENCOUNTER — LAB VISIT (OUTPATIENT)
Dept: LAB | Facility: HOSPITAL | Age: 70
End: 2025-05-13
Attending: INTERNAL MEDICINE
Payer: MEDICARE

## 2025-05-13 DIAGNOSIS — E09.9 STEROID-INDUCED DIABETES MELLITUS, SUBSEQUENT ENCOUNTER: ICD-10-CM

## 2025-05-13 DIAGNOSIS — T38.0X5D STEROID-INDUCED DIABETES MELLITUS, SUBSEQUENT ENCOUNTER: ICD-10-CM

## 2025-05-13 DIAGNOSIS — N18.4 CKD (CHRONIC KIDNEY DISEASE) STAGE 4, GFR 15-29 ML/MIN: ICD-10-CM

## 2025-05-13 DIAGNOSIS — N25.81 SECONDARY HYPERPARATHYROIDISM OF RENAL ORIGIN: ICD-10-CM

## 2025-05-13 LAB
ABSOLUTE EOSINOPHIL (OHS): 0.17 K/UL
ABSOLUTE MONOCYTE (OHS): 0.55 K/UL (ref 0.3–1)
ABSOLUTE NEUTROPHIL COUNT (OHS): 5.11 K/UL (ref 1.8–7.7)
ALBUMIN SERPL BCP-MCNC: 3.9 G/DL (ref 3.5–5.2)
ALBUMIN/CREAT UR: NORMAL
ANION GAP (OHS): 10 MMOL/L (ref 8–16)
BACTERIA #/AREA URNS HPF: ABNORMAL /HPF
BASOPHILS # BLD AUTO: 0.04 K/UL
BASOPHILS NFR BLD AUTO: 0.5 %
BILIRUB UR QL STRIP.AUTO: NEGATIVE
BUN SERPL-MCNC: 14 MG/DL (ref 7–17)
CALCIUM SERPL-MCNC: 8.6 MG/DL (ref 8.7–10.5)
CHLORIDE SERPL-SCNC: 104 MMOL/L (ref 95–110)
CLARITY UR: CLEAR
CO2 SERPL-SCNC: 25 MMOL/L (ref 23–29)
COLOR UR AUTO: YELLOW
CREAT SERPL-MCNC: 1.2 MG/DL (ref 0.5–1.4)
CREAT UR-MCNC: 86 MG/DL (ref 15–325)
CREAT UR-MCNC: 87.8 MG/DL (ref 15–325)
EAG (OHS): 111 MG/DL (ref 68–131)
ERYTHROCYTE [DISTWIDTH] IN BLOOD BY AUTOMATED COUNT: 13.3 % (ref 11.5–14.5)
GFR SERPLBLD CREATININE-BSD FMLA CKD-EPI: 50 ML/MIN/1.73/M2
GLUCOSE SERPL-MCNC: 100 MG/DL (ref 70–110)
GLUCOSE UR QL STRIP: ABNORMAL
HBA1C MFR BLD: 5.5 % (ref 4–5.6)
HCT VFR BLD AUTO: 38.2 % (ref 37–48.5)
HGB BLD-MCNC: 12.2 GM/DL (ref 12–16)
HGB UR QL STRIP: ABNORMAL
IMM GRANULOCYTES # BLD AUTO: 0.02 K/UL (ref 0–0.04)
IMM GRANULOCYTES NFR BLD AUTO: 0.3 % (ref 0–0.5)
KETONES UR QL STRIP: NEGATIVE
LEUKOCYTE ESTERASE UR QL STRIP: NEGATIVE
LYMPHOCYTES # BLD AUTO: 1.91 K/UL (ref 1–4.8)
MAGNESIUM SERPL-MCNC: 2.1 MG/DL (ref 1.6–2.6)
MCH RBC QN AUTO: 29.5 PG (ref 27–31)
MCHC RBC AUTO-ENTMCNC: 31.9 G/DL (ref 32–36)
MCV RBC AUTO: 92 FL (ref 82–98)
MICROALBUMIN UR-MCNC: <5 UG/ML (ref ?–5000)
MICROSCOPIC COMMENT: ABNORMAL
NITRITE UR QL STRIP: NEGATIVE
NUCLEATED RBC (/100WBC) (OHS): 0 /100 WBC
PH UR STRIP: 6 [PH]
PHOSPHATE SERPL-MCNC: 2.9 MG/DL (ref 2.7–4.5)
PLATELET # BLD AUTO: 234 K/UL (ref 150–450)
PMV BLD AUTO: 10.2 FL (ref 9.2–12.9)
POTASSIUM SERPL-SCNC: 4.7 MMOL/L (ref 3.5–5.1)
PROT UR QL STRIP: NEGATIVE
PROT UR-MCNC: 7 MG/DL
PROT/CREAT UR: 0.08 MG/G{CREAT}
PTH-INTACT SERPL-MCNC: 201.2 PG/ML (ref 9–77)
RBC # BLD AUTO: 4.14 M/UL (ref 4–5.4)
RELATIVE EOSINOPHIL (OHS): 2.2 %
RELATIVE LYMPHOCYTE (OHS): 24.5 % (ref 18–48)
RELATIVE MONOCYTE (OHS): 7.1 % (ref 4–15)
RELATIVE NEUTROPHIL (OHS): 65.4 % (ref 38–73)
SODIUM SERPL-SCNC: 139 MMOL/L (ref 136–145)
SP GR UR STRIP: 1.01
URATE SERPL-MCNC: 5.3 MG/DL (ref 2.4–5.7)
UROBILINOGEN UR STRIP-ACNC: NEGATIVE EU/DL
WBC # BLD AUTO: 7.8 K/UL (ref 3.9–12.7)
YEAST URNS QL MICRO: ABNORMAL /HPF

## 2025-05-13 PROCEDURE — 83036 HEMOGLOBIN GLYCOSYLATED A1C: CPT | Mod: PN

## 2025-05-13 PROCEDURE — 83735 ASSAY OF MAGNESIUM: CPT | Mod: PN

## 2025-05-13 PROCEDURE — 81003 URINALYSIS AUTO W/O SCOPE: CPT | Mod: PN

## 2025-05-13 PROCEDURE — 82570 ASSAY OF URINE CREATININE: CPT | Mod: PN

## 2025-05-13 PROCEDURE — 80069 RENAL FUNCTION PANEL: CPT | Mod: PN

## 2025-05-13 PROCEDURE — 84550 ASSAY OF BLOOD/URIC ACID: CPT | Mod: PN

## 2025-05-13 PROCEDURE — 85025 COMPLETE CBC W/AUTO DIFF WBC: CPT | Mod: PN

## 2025-05-13 PROCEDURE — 36415 COLL VENOUS BLD VENIPUNCTURE: CPT | Mod: PN

## 2025-05-13 PROCEDURE — 82043 UR ALBUMIN QUANTITATIVE: CPT | Mod: PN

## 2025-05-13 PROCEDURE — 83970 ASSAY OF PARATHORMONE: CPT | Mod: PN
